# Patient Record
Sex: FEMALE | URBAN - METROPOLITAN AREA
[De-identification: names, ages, dates, MRNs, and addresses within clinical notes are randomized per-mention and may not be internally consistent; named-entity substitution may affect disease eponyms.]

---

## 2021-12-10 ENCOUNTER — EMERGENCY (EMERGENCY)
Facility: HOSPITAL | Age: 56
LOS: 1 days | Discharge: ANOTHER HOSPITAL | End: 2021-12-10
Attending: EMERGENCY MEDICINE | Admitting: EMERGENCY MEDICINE
Payer: MEDICAID

## 2021-12-10 PROCEDURE — 70450 CT HEAD/BRAIN W/O DYE: CPT | Mod: 26,MA

## 2021-12-10 PROCEDURE — 99291 CRITICAL CARE FIRST HOUR: CPT | Mod: 25

## 2021-12-10 PROCEDURE — 93010 ELECTROCARDIOGRAM REPORT: CPT

## 2021-12-10 RX ORDER — LEVETIRACETAM 250 MG/1
1000 TABLET, FILM COATED ORAL ONCE
Refills: 0 | Status: COMPLETED | OUTPATIENT
Start: 2021-12-10 | End: 2021-12-10

## 2021-12-10 NOTE — ED PROVIDER NOTE - IV ALTEPLASE EXCL ABS HIDDEN
Returned to unit from MRI department via cart accompanied by transport with CIV in place. Patient stable.   show

## 2021-12-10 NOTE — ED PROVIDER NOTE - OBJECTIVE STATEMENT
55F no known PMH BIBEMS for concern of stroke. As per daughter at bedside, heard a thump in the living room, found pt on the floor on her stomach ~40min PTA. Living room carpeted, daughter belives she would have fallen off the couch. Pt has since been with slurred speech, not moving her R-side spontaneously. Not on AC. Was in normal state of health prior to this.

## 2021-12-10 NOTE — ED PROVIDER NOTE - CLINICAL SUMMARY MEDICAL DECISION MAKING FREE TEXT BOX
Elmo, PGY3 - 55F no known PMH presents as code stroke with slurred speech, R-sided weakness, LKN ~40min PTA. No e/o trauma on exam, maintaining airway. Plan for labs, CTs, neuro eval, dispo pending results 150/100, 109 Elmo, PGY3 - 55F no known PMH presents as code stroke with slurred speech, R-sided weakness, LKN ~40min PTA. No e/o trauma on exam, maintaining airway. Plan for labs, CTs, neuro eval, dispo pending results 74 minutes of CRITICAL CARE TIME WAS NECESSARY TO TREAT OR PREVENT LIFE THREATENING DETERIORATION FROM THE FOLLOWING CONDITIONS:  [  ] Heart Failure and/or Circulatory Collapse and or MI/NSTEMI  [  ] Sepsis [ X ] Respiratory failure  [ X ]CNS Failure or Compromise    [  ]Dehydration [  ]Renal Failure [  ]Hepatic Failure Sepsis [  ]Endocrine Crisis  [  ]Metabolic Crisis  [  ]Toxidrome   [  ]Trauma    CRITICAL CARE TIME WAS SPENT BY ME IN THE FOLLOWING ACTIVITIES:  [X  ] Performance of the History and Physical Examination [X  ] Review of Old Charts [   ] IV Access and or Obtaining Necessary Diagnostic Tests   [X  ] Ordering and Performing Treatments and Interventions:   Specifically:  [  ] Ventilator Management [  ] Vascular Access Procedures [  ] NGT Placement  [  ] Transcutaneous Pacing  [ X ] Establishment of Goals of Care with the Patient or Their Designated Representative  [X  ] Developing and Evaluating Treatment Plan with Consultants and/or the Primary Provider  [  ] Serial Reevaluation of the Patients Condition and Response to Therapeutic Measures   Specifically: [  ] Interpretation of Cardiac and Respiratory Parameters  [X  ]Ordering and Interpretating  Diagnostic Studies  Comments:  Elmo, PGY3 - 55F no known PMH presents as code stroke with slurred speech, R-sided weakness, LKN ~40min PTA. No e/o trauma on exam, maintaining airway. Plan for labs, CTs, neuro eval, dispo pending results

## 2021-12-10 NOTE — ED PROVIDER NOTE - PROGRESS NOTE DETAILS
Elmo, PGY3 - CT showing L parietal bleed, nsgy paged Elmo, PGY3 - CT showing L basal ganglia bleed, nsgy paged Elmo, PGY3 - Pt previously seen by nsgy, no acute intervention. Pt on repeat exam previously with increased strength to RLE. 4hr interval CTH stable, no change. Will admit to floors. Pt reevaluated, has been sleeping comfortable, airway intact Jacob ALCANTARA: Pt signed out to me.  Repeat CT stable, upon reassessment by neurosurgery, they now would like the patient transferred to [facility] for neurosurgical intervention (accepting Dr Flores).  Pt is stable, awake, no acute complaitns.  Family at bedside notified of transfer and agreeable to plan. Jacob ALCANTARA: Pt signed out to me.  Repeat CT stable, upon reassessment by neurosurgery, they now would like the patient transferred to [facility] ED for angiography and for neurosurgical intervention (accepting Dr Flores).  Pt is stable, awake, no acute complaitns.  Family at bedside notified of transfer and agreeable to plan.

## 2021-12-10 NOTE — ED PROVIDER NOTE - ATTENDING CONTRIBUTION TO CARE
Patient received in signout from Laure James/Ameya   CRITICAL CARE TIME WAS NECESSARY TO TREAT OR PREVENT LIFE THREATENING DETERIORATION FROM THE FOLLOWING CONDITIONS:  [  ] Heart Failure and/or Circulatory Collapse and or MI/NSTEMI  [  ] Sepsis [ X ] Respiratory failure  [ X ]CNS Failure or Compromise    [  ]Dehydration [  ]Renal Failure [  ]Hepatic Failure Sepsis [  ]Endocrine Crisis  [  ]Metabolic Crisis  [  ]Toxidrome   [  ]Trauma    CRITICAL CARE TIME WAS SPENT BY ME IN THE FOLLOWING ACTIVITIES:  [X  ] Performance of the History and Physical Examination [X  ] Review of Old Charts [   ] IV Access and or Obtaining Necessary Diagnostic Tests   [X  ] Ordering and Performing Treatments and Interventions:   Specifically:  [  ] Ventilator Management [  ] Vascular Access Procedures [  ] NGT Placement  [  ] Transcutaneous Pacing  [ X ] Establishment of Goals of Care with the Patient or Their Designated Representative  [X  ] Developing and Evaluating Treatment Plan with Consultants and/or the Primary Provider  [  ] Serial Reevaluation of the Patients Condition and Response to Therapeutic Measures   Specifically: [  ] Interpretation of Cardiac and Respiratory Parameters  [X  ]Ordering and Interpretating  Diagnostic Studies  Comments:    The patient is a 55y Female who has no pertinent past medical history PTED brought as a stroke code by  after being found by them on the floor after an audible fall CT+ for L basal ganglia hemorrhage   Vital Signs Last 24 Hrs  T(F): 98 HR: 94 BP: 131/90 RR: 15 SpO2: 98% (11 Dec 2021 06:22) Weight (kg): 86.5 (12-11-21 @ 00:23)  PE: as described; my additions and exceptions are noted in the chart    DATA:  EKG: NST@118; ? age Inf infarct. NO OLD EKGs  LAB:                       15.7   9.93  )-----------( 301      ( 10 Dec 2021 23:56 )             48.5     PT: 12.0 sec;   INR: 1.05 ratio  PTT - ( 10 Dec 2021 23:56 )  PTT:32.7 sec12-10    136  |  99  |  17  ----------------------------<  167<H>  4.0   |  25  |  0.88    Ca    9.6      10 Dec 2021 23:56 TPro  7.1  /  Alb  4.2  /  TBili  0.4  /  DBili  x   /  AST  40<H>  /  ALT  36<H>  /  AlkPhos  183<H>  12-10  Troponin T, High Sensitivity Result: 6 ng/L (12-10-21 @ 23:56)    CT IMPRESSION: Left basal ganglia hemorrhage, unchanged since the previous exam     IMPRESSION/RISK:  Dx=Basal ganglia bleed   Consideration include: Patient stable in ED while being monitored for progression repeat CT unchanged; Def care at [facility]; will transfer   Plan  Neuro/Neurosurgery consults appreciated    SBP goal <140 mmhg  Telemetry Monitoring  Swallowing assessment  Patient to be transferred to [facility] for further management

## 2021-12-10 NOTE — ED PROVIDER NOTE - PHYSICAL EXAMINATION
I have reviewed the triage vital signs.  Const: awake, in NAD  Eyes: no conjunctival injection, pupils 3mm round  HENT: NCAT, Neck supple, oral mucosa moist  CV: RRR, +S1, S2  Resp: CTAB, no respiratory distress  GI: Abdomen soft, NTND, no guarding  Extremities: No peripheral edema,  2+ radial and DP pulses  Skin: Warm, well perfused, no rash  MSK: No gross deformities appreciated  Neuro: Slurred, speech, Face appears symmetric, moving LUE/LLE spontaneously, RUE/RLE withdraws to pain I have reviewed the triage vital signs.  Const: awake, in NAD  Eyes: no conjunctival injection, pupils 3mm round  HENT: NCAT, Neck supple, oral mucosa moist  CV: RRR, +S1, S2  Resp: CTAB, no respiratory distress  GI: Abdomen soft, NTND, no guarding  Extremities: No peripheral edema,  2+ radial and DP pulses  Skin: Warm, well perfused, no rash  MSK: No gross deformities appreciated  Neuro: Slurred speech, expressive aphasia, Face appears symmetric, moving LUE/LLE spontaneously, RUE/RLE withdraws to pain

## 2021-12-11 ENCOUNTER — INPATIENT (INPATIENT)
Facility: HOSPITAL | Age: 56
LOS: 5 days | Discharge: NURSING, REHAB OR LONG-TERM CARE | DRG: 65 | End: 2021-12-17
Attending: NEUROLOGICAL SURGERY | Admitting: NEUROLOGICAL SURGERY
Payer: MEDICAID

## 2021-12-11 ENCOUNTER — APPOINTMENT (OUTPATIENT)
Dept: NEUROSURGERY | Facility: HOSPITAL | Age: 56
End: 2021-12-11

## 2021-12-11 ENCOUNTER — TRANSCRIPTION ENCOUNTER (OUTPATIENT)
Age: 56
End: 2021-12-11

## 2021-12-11 VITALS
HEART RATE: 110 BPM | OXYGEN SATURATION: 95 % | TEMPERATURE: 99 F | DIASTOLIC BLOOD PRESSURE: 103 MMHG | WEIGHT: 190.48 LBS | RESPIRATION RATE: 20 BRPM | SYSTOLIC BLOOD PRESSURE: 155 MMHG

## 2021-12-11 VITALS
RESPIRATION RATE: 16 BRPM | DIASTOLIC BLOOD PRESSURE: 98 MMHG | OXYGEN SATURATION: 96 % | SYSTOLIC BLOOD PRESSURE: 157 MMHG | TEMPERATURE: 98 F | HEART RATE: 94 BPM

## 2021-12-11 VITALS — WEIGHT: 190.7 LBS

## 2021-12-11 DIAGNOSIS — I61.9 NONTRAUMATIC INTRACEREBRAL HEMORRHAGE, UNSPECIFIED: ICD-10-CM

## 2021-12-11 LAB
A1C WITH ESTIMATED AVERAGE GLUCOSE RESULT: 6.3 % — HIGH (ref 4–5.6)
ALBUMIN SERPL ELPH-MCNC: 4.1 G/DL — SIGNIFICANT CHANGE UP (ref 3.3–5)
ALBUMIN SERPL ELPH-MCNC: 4.2 G/DL — SIGNIFICANT CHANGE UP (ref 3.3–5)
ALP SERPL-CCNC: 177 U/L — HIGH (ref 40–120)
ALP SERPL-CCNC: 183 U/L — HIGH (ref 40–120)
ALT FLD-CCNC: 36 U/L — HIGH (ref 4–33)
ALT FLD-CCNC: 36 U/L — SIGNIFICANT CHANGE UP (ref 10–45)
ANION GAP SERPL CALC-SCNC: 12 MMOL/L — SIGNIFICANT CHANGE UP (ref 7–14)
ANION GAP SERPL CALC-SCNC: 13 MMOL/L — SIGNIFICANT CHANGE UP (ref 5–17)
ANION GAP SERPL CALC-SCNC: 13 MMOL/L — SIGNIFICANT CHANGE UP (ref 5–17)
APTT BLD: 32.7 SEC — SIGNIFICANT CHANGE UP (ref 27–36.3)
APTT BLD: 35 SEC — SIGNIFICANT CHANGE UP (ref 27.5–35.5)
AST SERPL-CCNC: 32 U/L — SIGNIFICANT CHANGE UP (ref 10–40)
AST SERPL-CCNC: 40 U/L — HIGH (ref 4–32)
BASOPHILS # BLD AUTO: 0.04 K/UL — SIGNIFICANT CHANGE UP (ref 0–0.2)
BASOPHILS # BLD AUTO: 0.05 K/UL — SIGNIFICANT CHANGE UP (ref 0–0.2)
BASOPHILS NFR BLD AUTO: 0.4 % — SIGNIFICANT CHANGE UP (ref 0–2)
BASOPHILS NFR BLD AUTO: 0.4 % — SIGNIFICANT CHANGE UP (ref 0–2)
BILIRUB SERPL-MCNC: 0.4 MG/DL — SIGNIFICANT CHANGE UP (ref 0.2–1.2)
BILIRUB SERPL-MCNC: 0.5 MG/DL — SIGNIFICANT CHANGE UP (ref 0.2–1.2)
BLD GP AB SCN SERPL QL: NEGATIVE — SIGNIFICANT CHANGE UP
BLD GP AB SCN SERPL QL: NEGATIVE — SIGNIFICANT CHANGE UP
BUN SERPL-MCNC: 14 MG/DL — SIGNIFICANT CHANGE UP (ref 7–23)
BUN SERPL-MCNC: 17 MG/DL — SIGNIFICANT CHANGE UP (ref 7–23)
BUN SERPL-MCNC: 9 MG/DL — SIGNIFICANT CHANGE UP (ref 7–23)
CALCIUM SERPL-MCNC: 9 MG/DL — SIGNIFICANT CHANGE UP (ref 8.4–10.5)
CALCIUM SERPL-MCNC: 9.1 MG/DL — SIGNIFICANT CHANGE UP (ref 8.4–10.5)
CALCIUM SERPL-MCNC: 9.6 MG/DL — SIGNIFICANT CHANGE UP (ref 8.4–10.5)
CHLORIDE SERPL-SCNC: 104 MMOL/L — SIGNIFICANT CHANGE UP (ref 96–108)
CHLORIDE SERPL-SCNC: 107 MMOL/L — SIGNIFICANT CHANGE UP (ref 96–108)
CHLORIDE SERPL-SCNC: 99 MMOL/L — SIGNIFICANT CHANGE UP (ref 98–107)
CO2 SERPL-SCNC: 20 MMOL/L — LOW (ref 22–31)
CO2 SERPL-SCNC: 20 MMOL/L — LOW (ref 22–31)
CO2 SERPL-SCNC: 25 MMOL/L — SIGNIFICANT CHANGE UP (ref 22–31)
CREAT SERPL-MCNC: 0.38 MG/DL — LOW (ref 0.5–1.3)
CREAT SERPL-MCNC: 0.4 MG/DL — LOW (ref 0.5–1.3)
CREAT SERPL-MCNC: 0.88 MG/DL — SIGNIFICANT CHANGE UP (ref 0.5–1.3)
EOSINOPHIL # BLD AUTO: 0.02 K/UL — SIGNIFICANT CHANGE UP (ref 0–0.5)
EOSINOPHIL # BLD AUTO: 0.13 K/UL — SIGNIFICANT CHANGE UP (ref 0–0.5)
EOSINOPHIL NFR BLD AUTO: 0.2 % — SIGNIFICANT CHANGE UP (ref 0–6)
EOSINOPHIL NFR BLD AUTO: 1.3 % — SIGNIFICANT CHANGE UP (ref 0–6)
ESTIMATED AVERAGE GLUCOSE: 134 MG/DL — HIGH (ref 68–114)
GLUCOSE SERPL-MCNC: 127 MG/DL — HIGH (ref 70–99)
GLUCOSE SERPL-MCNC: 129 MG/DL — HIGH (ref 70–99)
GLUCOSE SERPL-MCNC: 167 MG/DL — HIGH (ref 70–99)
HCG SERPL-ACNC: <2 MIU/ML — SIGNIFICANT CHANGE UP
HCT VFR BLD CALC: 42.7 % — SIGNIFICANT CHANGE UP (ref 34.5–45)
HCT VFR BLD CALC: 46.8 % — HIGH (ref 34.5–45)
HCT VFR BLD CALC: 48.5 % — HIGH (ref 34.5–45)
HEPARINASE TEG R TIME: 5.5 MIN — SIGNIFICANT CHANGE UP (ref 4.3–8.3)
HGB BLD-MCNC: 14.2 G/DL — SIGNIFICANT CHANGE UP (ref 11.5–15.5)
HGB BLD-MCNC: 15.3 G/DL — SIGNIFICANT CHANGE UP (ref 11.5–15.5)
HGB BLD-MCNC: 15.7 G/DL — HIGH (ref 11.5–15.5)
IANC: 6.48 K/UL — SIGNIFICANT CHANGE UP (ref 1.5–8.5)
IMM GRANULOCYTES NFR BLD AUTO: 0.2 % — SIGNIFICANT CHANGE UP (ref 0–1.5)
IMM GRANULOCYTES NFR BLD AUTO: 0.4 % — SIGNIFICANT CHANGE UP (ref 0–1.5)
INR BLD: 1.04 RATIO — SIGNIFICANT CHANGE UP (ref 0.88–1.16)
INR BLD: 1.05 RATIO — SIGNIFICANT CHANGE UP (ref 0.88–1.16)
LYMPHOCYTES # BLD AUTO: 1.67 K/UL — SIGNIFICANT CHANGE UP (ref 1–3.3)
LYMPHOCYTES # BLD AUTO: 13.5 % — SIGNIFICANT CHANGE UP (ref 13–44)
LYMPHOCYTES # BLD AUTO: 2.61 K/UL — SIGNIFICANT CHANGE UP (ref 1–3.3)
LYMPHOCYTES # BLD AUTO: 26.3 % — SIGNIFICANT CHANGE UP (ref 13–44)
MAGNESIUM SERPL-MCNC: 2 MG/DL — SIGNIFICANT CHANGE UP (ref 1.6–2.6)
MCHC RBC-ENTMCNC: 28 PG — SIGNIFICANT CHANGE UP (ref 27–34)
MCHC RBC-ENTMCNC: 28 PG — SIGNIFICANT CHANGE UP (ref 27–34)
MCHC RBC-ENTMCNC: 28.5 PG — SIGNIFICANT CHANGE UP (ref 27–34)
MCHC RBC-ENTMCNC: 32.4 GM/DL — SIGNIFICANT CHANGE UP (ref 32–36)
MCHC RBC-ENTMCNC: 32.7 GM/DL — SIGNIFICANT CHANGE UP (ref 32–36)
MCHC RBC-ENTMCNC: 33.3 GM/DL — SIGNIFICANT CHANGE UP (ref 32–36)
MCV RBC AUTO: 84.2 FL — SIGNIFICANT CHANGE UP (ref 80–100)
MCV RBC AUTO: 85.7 FL — SIGNIFICANT CHANGE UP (ref 80–100)
MCV RBC AUTO: 88 FL — SIGNIFICANT CHANGE UP (ref 80–100)
MONOCYTES # BLD AUTO: 0.65 K/UL — SIGNIFICANT CHANGE UP (ref 0–0.9)
MONOCYTES # BLD AUTO: 0.83 K/UL — SIGNIFICANT CHANGE UP (ref 0–0.9)
MONOCYTES NFR BLD AUTO: 6.5 % — SIGNIFICANT CHANGE UP (ref 2–14)
MONOCYTES NFR BLD AUTO: 6.7 % — SIGNIFICANT CHANGE UP (ref 2–14)
NEUTROPHILS # BLD AUTO: 6.48 K/UL — SIGNIFICANT CHANGE UP (ref 1.8–7.4)
NEUTROPHILS # BLD AUTO: 9.74 K/UL — HIGH (ref 1.8–7.4)
NEUTROPHILS NFR BLD AUTO: 65.3 % — SIGNIFICANT CHANGE UP (ref 43–77)
NEUTROPHILS NFR BLD AUTO: 78.8 % — HIGH (ref 43–77)
NRBC # BLD: 0 /100 WBCS — SIGNIFICANT CHANGE UP
NRBC # BLD: 0 /100 WBCS — SIGNIFICANT CHANGE UP (ref 0–0)
NRBC # BLD: 0 /100 WBCS — SIGNIFICANT CHANGE UP (ref 0–0)
NRBC # FLD: 0 K/UL — SIGNIFICANT CHANGE UP
PA ADP PRP-ACNC: 174 PRU — LOW (ref 194–417)
PHOSPHATE SERPL-MCNC: 3.3 MG/DL — SIGNIFICANT CHANGE UP (ref 2.5–4.5)
PLATELET # BLD AUTO: 297 K/UL — SIGNIFICANT CHANGE UP (ref 150–400)
PLATELET # BLD AUTO: 301 K/UL — SIGNIFICANT CHANGE UP (ref 150–400)
PLATELET # BLD AUTO: 323 K/UL — SIGNIFICANT CHANGE UP (ref 150–400)
POTASSIUM SERPL-MCNC: 3.7 MMOL/L — SIGNIFICANT CHANGE UP (ref 3.5–5.3)
POTASSIUM SERPL-MCNC: 3.9 MMOL/L — SIGNIFICANT CHANGE UP (ref 3.5–5.3)
POTASSIUM SERPL-MCNC: 4 MMOL/L — SIGNIFICANT CHANGE UP (ref 3.5–5.3)
POTASSIUM SERPL-SCNC: 3.7 MMOL/L — SIGNIFICANT CHANGE UP (ref 3.5–5.3)
POTASSIUM SERPL-SCNC: 3.9 MMOL/L — SIGNIFICANT CHANGE UP (ref 3.5–5.3)
POTASSIUM SERPL-SCNC: 4 MMOL/L — SIGNIFICANT CHANGE UP (ref 3.5–5.3)
PROT SERPL-MCNC: 7.1 G/DL — SIGNIFICANT CHANGE UP (ref 6–8.3)
PROT SERPL-MCNC: 7.8 G/DL — SIGNIFICANT CHANGE UP (ref 6–8.3)
PROTHROM AB SERPL-ACNC: 12 SEC — SIGNIFICANT CHANGE UP (ref 10.6–13.6)
PROTHROM AB SERPL-ACNC: 12.4 SEC — SIGNIFICANT CHANGE UP (ref 10.6–13.6)
RAPIDTEG MAXIMUM AMPLITUDE: 67.4 MM — SIGNIFICANT CHANGE UP (ref 52–70)
RBC # BLD: 5.07 M/UL — SIGNIFICANT CHANGE UP (ref 3.8–5.2)
RBC # BLD: 5.46 M/UL — HIGH (ref 3.8–5.2)
RBC # BLD: 5.51 M/UL — HIGH (ref 3.8–5.2)
RBC # FLD: 13.2 % — SIGNIFICANT CHANGE UP (ref 10.3–14.5)
RBC # FLD: 13.2 % — SIGNIFICANT CHANGE UP (ref 10.3–14.5)
RBC # FLD: 13.3 % — SIGNIFICANT CHANGE UP (ref 10.3–14.5)
RH IG SCN BLD-IMP: POSITIVE — SIGNIFICANT CHANGE UP
SARS-COV-2 RNA SPEC QL NAA+PROBE: SIGNIFICANT CHANGE UP
SODIUM SERPL-SCNC: 136 MMOL/L — SIGNIFICANT CHANGE UP (ref 135–145)
SODIUM SERPL-SCNC: 137 MMOL/L — SIGNIFICANT CHANGE UP (ref 135–145)
SODIUM SERPL-SCNC: 140 MMOL/L — SIGNIFICANT CHANGE UP (ref 135–145)
TEG FUNCTIONAL FIBRINOGEN: 26.6 MM — SIGNIFICANT CHANGE UP (ref 15–32)
TEG MAXIMUM AMPLITUDE: 66 MM — SIGNIFICANT CHANGE UP (ref 52–69)
TEG REACTION TIME: 6.8 MIN — SIGNIFICANT CHANGE UP (ref 4.6–9.1)
TROPONIN T, HIGH SENSITIVITY RESULT: 6 NG/L — SIGNIFICANT CHANGE UP
TROPONIN T, HIGH SENSITIVITY RESULT: 7 NG/L — SIGNIFICANT CHANGE UP (ref 0–51)
WBC # BLD: 12.36 K/UL — HIGH (ref 3.8–10.5)
WBC # BLD: 9.26 K/UL — SIGNIFICANT CHANGE UP (ref 3.8–10.5)
WBC # BLD: 9.93 K/UL — SIGNIFICANT CHANGE UP (ref 3.8–10.5)
WBC # FLD AUTO: 12.36 K/UL — HIGH (ref 3.8–10.5)
WBC # FLD AUTO: 9.26 K/UL — SIGNIFICANT CHANGE UP (ref 3.8–10.5)
WBC # FLD AUTO: 9.93 K/UL — SIGNIFICANT CHANGE UP (ref 3.8–10.5)

## 2021-12-11 PROCEDURE — 99291 CRITICAL CARE FIRST HOUR: CPT

## 2021-12-11 PROCEDURE — 36226 PLACE CATH VERTEBRAL ART: CPT | Mod: 50

## 2021-12-11 PROCEDURE — 36227 PLACE CATH XTRNL CAROTID: CPT

## 2021-12-11 PROCEDURE — 70450 CT HEAD/BRAIN W/O DYE: CPT | Mod: 26,MA

## 2021-12-11 PROCEDURE — 70498 CT ANGIOGRAPHY NECK: CPT | Mod: 26,MG

## 2021-12-11 PROCEDURE — 36224 PLACE CATH CAROTD ART: CPT | Mod: 50

## 2021-12-11 PROCEDURE — 99292 CRITICAL CARE ADDL 30 MIN: CPT

## 2021-12-11 PROCEDURE — 99253 IP/OBS CNSLTJ NEW/EST LOW 45: CPT | Mod: 25

## 2021-12-11 PROCEDURE — G1004: CPT

## 2021-12-11 PROCEDURE — 70450 CT HEAD/BRAIN W/O DYE: CPT | Mod: 26,77

## 2021-12-11 PROCEDURE — 99232 SBSQ HOSP IP/OBS MODERATE 35: CPT | Mod: 25

## 2021-12-11 PROCEDURE — 70496 CT ANGIOGRAPHY HEAD: CPT | Mod: 26,MG

## 2021-12-11 PROCEDURE — 70450 CT HEAD/BRAIN W/O DYE: CPT | Mod: 26,59,MG

## 2021-12-11 PROCEDURE — 93010 ELECTROCARDIOGRAM REPORT: CPT

## 2021-12-11 PROCEDURE — 93010 ELECTROCARDIOGRAM REPORT: CPT | Mod: 76

## 2021-12-11 RX ORDER — NICARDIPINE HYDROCHLORIDE 30 MG/1
5 CAPSULE, EXTENDED RELEASE ORAL
Qty: 40 | Refills: 0 | Status: DISCONTINUED | OUTPATIENT
Start: 2021-12-11 | End: 2021-12-12

## 2021-12-11 RX ORDER — LEVETIRACETAM 250 MG/1
500 TABLET, FILM COATED ORAL EVERY 12 HOURS
Refills: 0 | Status: DISCONTINUED | OUTPATIENT
Start: 2021-12-11 | End: 2021-12-11

## 2021-12-11 RX ORDER — ONDANSETRON 8 MG/1
4 TABLET, FILM COATED ORAL EVERY 6 HOURS
Refills: 0 | Status: DISCONTINUED | OUTPATIENT
Start: 2021-12-11 | End: 2021-12-12

## 2021-12-11 RX ORDER — ACETAMINOPHEN 500 MG
650 TABLET ORAL EVERY 6 HOURS
Refills: 0 | Status: DISCONTINUED | OUTPATIENT
Start: 2021-12-11 | End: 2021-12-17

## 2021-12-11 RX ORDER — LEVETIRACETAM 250 MG/1
500 TABLET, FILM COATED ORAL EVERY 12 HOURS
Refills: 0 | Status: DISCONTINUED | OUTPATIENT
Start: 2021-12-11 | End: 2021-12-12

## 2021-12-11 RX ORDER — PANTOPRAZOLE SODIUM 20 MG/1
40 TABLET, DELAYED RELEASE ORAL
Refills: 0 | Status: DISCONTINUED | OUTPATIENT
Start: 2021-12-11 | End: 2021-12-11

## 2021-12-11 RX ORDER — POTASSIUM CHLORIDE 20 MEQ
40 PACKET (EA) ORAL ONCE
Refills: 0 | Status: DISCONTINUED | OUTPATIENT
Start: 2021-12-11 | End: 2021-12-12

## 2021-12-11 RX ORDER — PANTOPRAZOLE SODIUM 20 MG/1
40 TABLET, DELAYED RELEASE ORAL DAILY
Refills: 0 | Status: DISCONTINUED | OUTPATIENT
Start: 2021-12-11 | End: 2021-12-12

## 2021-12-11 RX ORDER — SODIUM CHLORIDE 9 MG/ML
1000 INJECTION INTRAMUSCULAR; INTRAVENOUS; SUBCUTANEOUS
Refills: 0 | Status: DISCONTINUED | OUTPATIENT
Start: 2021-12-11 | End: 2021-12-12

## 2021-12-11 RX ORDER — SENNA PLUS 8.6 MG/1
2 TABLET ORAL AT BEDTIME
Refills: 0 | Status: DISCONTINUED | OUTPATIENT
Start: 2021-12-11 | End: 2021-12-17

## 2021-12-11 RX ADMIN — SODIUM CHLORIDE 75 MILLILITER(S): 9 INJECTION INTRAMUSCULAR; INTRAVENOUS; SUBCUTANEOUS at 17:32

## 2021-12-11 RX ADMIN — LEVETIRACETAM 400 MILLIGRAM(S): 250 TABLET, FILM COATED ORAL at 00:09

## 2021-12-11 RX ADMIN — LEVETIRACETAM 400 MILLIGRAM(S): 250 TABLET, FILM COATED ORAL at 17:18

## 2021-12-11 RX ADMIN — PANTOPRAZOLE SODIUM 40 MILLIGRAM(S): 20 TABLET, DELAYED RELEASE ORAL at 17:18

## 2021-12-11 RX ADMIN — NICARDIPINE HYDROCHLORIDE 25 MG/HR: 30 CAPSULE, EXTENDED RELEASE ORAL at 10:00

## 2021-12-11 NOTE — ED PROVIDER NOTE - CRITICAL CARE ATTENDING CONTRIBUTION TO CARE
------------ATTENDING NOTE------------  pt transferred from [facility] for concerns of spontaneous IPH / hemiplegia, HD stable on ED arrival, protecting airway, oxygenating/ventilating well, no additional complaints, awaiting labs/imaging and NSGY consult for tx / planned admission.  - Wan Thomas MD   ----------------------------------------------

## 2021-12-11 NOTE — ED ADULT NURSE REASSESSMENT NOTE - NS ED NURSE REASSESS COMMENT FT1
PT is resting in stretcher, arousable to physical stimuli. pt breathing even and labored on room air. pt NSR on the CM. pt appears comfortable with family at bedside. no apparent distress noted. will continue to monitor.

## 2021-12-11 NOTE — ED PROVIDER NOTE - PHYSICAL EXAMINATION
CONSTITUTIONAL: non-toxic, NAD  SKIN: no rash, no petechiae.  EYES: PERRL, EOMI, pink conjunctiva, anicteric  NECK: Supple; no meningismus, no JVD  CARD: RRR, no murmurs, equal radial pulses bilaterally 2+  RESP: CTAB, no respiratory distress  ABD: Soft, non-tender, non-distended, no peritoneal signs, no CVA tenderness  EXT: Normal ROM x4. No edema. No calf tenderness  NEURO: Alert, following commands. Right sided facial weakness sparing forehead. Following commands. Dysarthric speech. RUE 0/5 strength RLE 3/5 strength LUE 5/5 strength LLE 4/5 strength.  PSYCH: Cooperative, appropriate. CONSTITUTIONAL: non-toxic, NAD  SKIN: no rash, no petechiae.  EYES: PERRL, EOMI, pink conjunctiva, anicteric  NECK: Supple; no meningismus, no JVD  CARD: RRR, no murmurs, equal radial pulses bilaterally 2+  RESP: CTAB, no respiratory distress  ABD: Soft, non-tender, non-distended, no peritoneal signs, no CVA tenderness  EXT: No edema. No calf tenderness  NEURO: Alert, following commands. Right sided facial weakness sparing forehead. Following commands. Dysarthric speech. RUE 0/5 strength RLE 3/5 strength LUE 5/5 strength LLE 4/5 strength.  PSYCH: Cooperative, appropriate.

## 2021-12-11 NOTE — CONSULT NOTE ADULT - ATTENDING COMMENTS
L basal ganglia ICH, moderate size with negative CTA. Given relatively young age and no history of HTN, angiogram is warranted to definitively rule out underlying vascular lesion. Will transfer to [facility] for angio.

## 2021-12-11 NOTE — ED ADULT NURSE REASSESSMENT NOTE - NS ED NURSE REASSESS COMMENT FT1
Neurosurgery MD at bedside evaluating patient. Neurosurgery MD at bedside evaluating patient. Per MD Mcdaniel, SBP to be <160.

## 2021-12-11 NOTE — PROGRESS NOTE ADULT - SUBJECTIVE AND OBJECTIVE BOX
HPI:  55F w/ no sig pmh, not on AC/AP found down by daughter w/ R weakness and slurred speech after hearing a sound found to have a L 4.5 x 2.4 x 3.6 cm BG IPH with no shift, CTA neg. 10hr repeat on Left done at [facility] stable. LKN 10:30 pm.  Exam: awake, eomi, pupils 3R b/l, R facial, FC, expressive aphasia, R hemiparesis 2/5, L side 5/5, no sensation on Right side.  -Adm NSCU, SBP<160  -Hold AC/AP,  -Keppra 500 bid  -Preop Angio today (11 Dec 2021 11:42)    SURGERY:       EVENTS:         ICU Vital Signs Last 24 Hrs  T(C): 36.1 (11 Dec 2021 16:00), Max: 36.6 (11 Dec 2021 09:20)  T(F): 97 (11 Dec 2021 16:00), Max: 97.8 (11 Dec 2021 09:20)  HR: 77 (11 Dec 2021 16:15) (68 - 98)  BP: 135/108 (11 Dec 2021 16:15) (115/83 - 160/85)  BP(mean): 118 (11 Dec 2021 16:15) (94 - 118)  ABP: --  ABP(mean): --  RR: 17 (11 Dec 2021 16:15) (14 - 17)  SpO2: 98% (11 Dec 2021 16:15) (94% - 99%)     12-11 @ 07:01  -  12-11 @ 16:26  --------------------------------------------------------  IN: 40 mL / OUT: 0 mL / NET: 40 mL                             15.3   12.36 )-----------( 323      ( 11 Dec 2021 10:05 )             46.8    12-11    137  |  104  |  14  ----------------------------<  129<H>  3.9   |  20<L>  |  0.40<L>    Ca    9.1      11 Dec 2021 10:05    TPro  7.8  /  Alb  4.1  /  TBili  0.5  /  DBili  x   /  AST  32  /  ALT  36  /  AlkPhos  177<H>  12-11            PHYSICAL EXAM:    General: No Acute Distress     Neurological: Awake, alert to self place w choice, not year, FC, EOMI w L gaze pref, crosses midline, PERRL 3mm bilat, R facial, marked expressive aphasia w dysarthria, RUE extends to nox, flaccid, RLE safeguard ,wiggles toes to command, L side intact, no drift, no extinction    Pulmonary: Clear to Auscultation, No Rales, No Rhonchi, No Wheezes     Cardiovascular: S1, S2, Regular Rate and Rhythm     Gastrointestinal: Soft, Nontender, distended     Extremities: No calf tenderness     Incision:       MEDICATIONS:  Antibiotics:      Neurological:   acetaminophen     Tablet .. 650 milliGRAM(s) Oral every 6 hours PRN  levETIRAcetam 500 milliGRAM(s) Oral every 12 hours  ondansetron Injectable 4 milliGRAM(s) IV Push every 6 hours PRN    Cardiac:   niCARdipine Infusion 5 mG/Hr IV Continuous <Continuous>    Pulm:    Heme:     Other:   pantoprazole    Tablet 40 milliGRAM(s) Oral before breakfast  senna 2 Tablet(s) Oral at bedtime PRN Constipation  sodium chloride 0.9%. 1000 milliLiter(s) IV Continuous <Continuous>       DEVICES: [] Restraints [] DIONY/HMV []LD [] ET tube [] Trach [] Chest Tube [] A-line [] Michael [] NGT [] Rectal Tube       A/P:  HPI:  55F w/ no sig pmh, not on AC/AP found down by daughter w/ R weakness and slurred speech after hearing a sound found to have a L 4.5 x 2.4 x 3.6 cm BG IPH with no shift, CTA neg. 10hr repeat on Left done at [facility] stable. LKN 10:30 pm.  Exam: awake, eomi, pupils 3R b/l, R facial, FC, expressive aphasia, R hemiparesis 2/5, L side 5/5, no sensation on Right side.        Neuro: nchecksq1  angio negative  stability CTH around 7 30 pm  hold AC, AP  Pain control  keppra sz ppx per nsgy  Stroke core measures  PT OT  Respiratory: RA IS  CV: check echo, troponin, ecg, lipid profile  -160 cardene PRN  Endocrine: check a1c glycemic goal 120-180  Heme/Onc:       check LED      DVT ppx: SCDs, hold ac ap  Renal: IVF until good PO intake, IOs  ID: luekocytosis monitor  GI: NPO dysphagia screen, swallow eval  Social/Family:   Discharge planning:     Code Status: [x] Full Code [] DNR [] DNI [] Goals of Care:   Disposition: [x] ICU [] Stroke Unit [] RCU []PCU []Floor [] Discharge Home     Patient at high risk for neurologic deterioration, critical care time, excluding procedures: 45 minutes                    HPI:  55F w/ no sig pmh, not on AC/AP found down by daughter w/ R weakness and slurred speech after hearing a sound found to have a L 4.5 x 2.4 x 3.6 cm BG IPH with no shift, CTA neg. 10hr repeat on Left done at [facility] stable. LKN 10:30 pm.  Exam: awake, eomi, pupils 3R b/l, R facial, FC, expressive aphasia, R hemiparesis 2/5, L side 5/5, no sensation on Right side.  -Adm NSCU, SBP<160  -Hold AC/AP,  -Keppra 500 bid  -Preop Angio today (11 Dec 2021 11:42)    SURGERY:       EVENTS:         ICU Vital Signs Last 24 Hrs  T(C): 36.1 (11 Dec 2021 16:00), Max: 36.6 (11 Dec 2021 09:20)  T(F): 97 (11 Dec 2021 16:00), Max: 97.8 (11 Dec 2021 09:20)  HR: 77 (11 Dec 2021 16:15) (68 - 98)  BP: 135/108 (11 Dec 2021 16:15) (115/83 - 160/85)  BP(mean): 118 (11 Dec 2021 16:15) (94 - 118)  ABP: --  ABP(mean): --  RR: 17 (11 Dec 2021 16:15) (14 - 17)  SpO2: 98% (11 Dec 2021 16:15) (94% - 99%)     12-11 @ 07:01  -  12-11 @ 16:26  --------------------------------------------------------  IN: 40 mL / OUT: 0 mL / NET: 40 mL                             15.3   12.36 )-----------( 323      ( 11 Dec 2021 10:05 )             46.8    12-11    137  |  104  |  14  ----------------------------<  129<H>  3.9   |  20<L>  |  0.40<L>    Ca    9.1      11 Dec 2021 10:05    TPro  7.8  /  Alb  4.1  /  TBili  0.5  /  DBili  x   /  AST  32  /  ALT  36  /  AlkPhos  177<H>  12-11            PHYSICAL EXAM:    General: No Acute Distress     Neurological: Awake, alert to self place w choice, not year, FC, EOMI w L gaze pref, crosses midline, PERRL 3mm bilat, R facial, marked expressive aphasia w dysarthria, RUE extends to nox, flaccid, RLE safeguard ,wiggles toes to command, L side intact, no drift, no extinction    Pulmonary: Clear to Auscultation, No Rales, No Rhonchi, No Wheezes     Cardiovascular: S1, S2, Regular Rate and Rhythm     Gastrointestinal: Soft, Nontender, distended     Extremities: No calf tenderness     Incision:       MEDICATIONS:  Antibiotics:      Neurological:   acetaminophen     Tablet .. 650 milliGRAM(s) Oral every 6 hours PRN  levETIRAcetam 500 milliGRAM(s) Oral every 12 hours  ondansetron Injectable 4 milliGRAM(s) IV Push every 6 hours PRN    Cardiac:   niCARdipine Infusion 5 mG/Hr IV Continuous <Continuous>    Pulm:    Heme:     Other:   pantoprazole    Tablet 40 milliGRAM(s) Oral before breakfast  senna 2 Tablet(s) Oral at bedtime PRN Constipation  sodium chloride 0.9%. 1000 milliLiter(s) IV Continuous <Continuous>       DEVICES: [] Restraints [] DIONY/HMV []LD [] ET tube [] Trach [] Chest Tube [] A-line [] Michael [] NGT [] Rectal Tube       A/P:  55F presented with ICH , ICH score 1  likely HTN, angio negative   Neuro: nchecksq1  angio negative   stability CTH around 7 30 pm  hold AC, AP  Pain control  keppra sz ppx per nsgy  Stroke core measures  PT OT  Respiratory: RA IS  CV: check echo, troponin, ecg, lipid profile  -160 cardene PRN  Endocrine: check a1c glycemic goal 120-180  Heme/Onc:       check LED      DVT ppx: SCDs, hold ac ap  Renal: IVF until good PO intake, IOs  ID: luekocytosis monitor  GI: NPO dysphagia screen, swallow eval  Social/Family:   Discharge planning:     Code Status: [x] Full Code [] DNR [] DNI [] Goals of Care:   Disposition: [x] ICU [] Stroke Unit [] RCU []PCU []Floor [] Discharge Home     Patient at high risk for neurologic deterioration, critical care time, excluding procedures: 35 minutes

## 2021-12-11 NOTE — ED ADULT NURSE NOTE - OBJECTIVE STATEMENT
Juan RN note- patient arrives to the ED as a notification code stroke. Patient able to verbalize own name but unable to answer any other questions appropriately. Per patient's daughter she heard a loud noise and found the patient on the ground. Speech garbled and not appropriate. Slight ride sided facial droop noted. Patient responds to pain on the right upper and lower extremities but is unable to lift right upper and lower extremity. Normal movement noted to left side upper and lower extremities.  Stroke scale and dysphagia screen as documented. 20g IV placed to left AC. . Labs sent as ordered. COVID swab sent. Cardiac monitor in place- sinus tachycardia. Dr. Borrego at bedside for evaluation. Safety maintained. Patient stable upon exiting the room.

## 2021-12-11 NOTE — ED ADULT TRIAGE NOTE - CHIEF COMPLAINT QUOTE
pt arrives as code stroke for right sided paralysis that started 20 mins prior to arrival. direct to tra

## 2021-12-11 NOTE — CONSULT NOTE ADULT - ASSESSMENT
**INCOMPLETE**    Recommendations:  Imaging:  [] Repeat CTH in 4hrs and 24hrs or prn for worsening mental status or neuro exam  [] MRI brain w/ and w/o contrast  [] TTE    Meds:  [] Hold ASA and lovenox, use mechanical DVT prophylaxis for now  [] Consider labetalol for BP control    Labs:  [] HgA1c, Lipid profile    Other:  [] SBP goal 140-160 mmhg  [] F/u Neurosurgery consult  [] Telemetry Monitoring  [] Neuro checks, vitals per unit protocol  [] NPO unless passes bedside swallow. If fails, swallow eval  [] PT/OT evaluation  [] Stroke education provided      Case discussed with telestroke attending, Dr. Gutiérrez. To be seen and discussed with Dr. Freed. **INCOMPLETE**    Recommendations:  Imaging:  [] Repeat CTH in 4hrs and 24hrs or prn for worsening mental status or neuro exam  [] MRI brain w/ and w/o contrast  [] TTE    Meds:  [] Hold ASA and lovenox, use mechanical DVT prophylaxis for now  [] Consider labetalol for BP control    Labs:  [] HgA1c, Lipid profile    Other:  [] SBP goal <140 mmhg  [] F/u Neurosurgery consult  [] Telemetry Monitoring  [] Neuro checks, vitals per unit protocol  [] NPO unless passes bedside swallow. If fails, swallow eval  [] PT/OT evaluation  [] Stroke education provided      Case discussed with telestroke attending, Dr. Gutiérrez. To be seen and discussed with Dr. Freed. **INCOMPLETE**    Recommendations:  Patient to be transferred to [facility] for angiography    Imaging:  [] Repeat CTH in 4hrs and 24hrs or prn for worsening mental status or neuro exam  [] MRI brain w/ and w/o contrast  [] Angiogram as per NS  [] TTE    Meds:  [] Hold ASA and lovenox, use mechanical DVT prophylaxis for now  [] Consider labetalol for BP control    Labs:  [] HgA1c, Lipid profile    Other:  [] SBP goal <140 mmhg  [] F/u Neurosurgery consult  [] Telemetry Monitoring  [] Neuro checks, vitals per unit protocol  [] NPO unless passes bedside swallow. If fails, swallow eval  [] PT/OT evaluation  [] Stroke education provided      Case discussed with telestroke attending, Dr. Gutiérrez. To be seen and discussed with Dr. Freed. 55 y.o. RH W with no known PMH BIBEMS with R sided weakness, aphasia, and slurred speech. LKN 23:00. VS on presentation significant for /103, . On evaluation patient noted to have R hemiparesis, expressive and receptive aphasia, and dysarthria. CTH w/o demonstrates acute L basal ganglia IPH with partial effacement of the L lateral ventricle; no midline shift.      Impression: R hemiparesis, global aphasia and dysarthria secondary to L hemispheric dysfunction due to known acute L basal ganglia IPH.     Recommendations:  - Patient to be transferred to [facility] for angiography    Imaging:  [] Repeat CTH in 4hrs and 24hrs or prn for worsening mental status or neuro exam  [] MRI brain w/ and w/o contrast  [] Angiogram as per NS  [] TTE    Meds:  [] Hold ASA and lovenox, use mechanical DVT prophylaxis for now  [] Consider labetalol for BP control    Labs:  [] HgA1c, Lipid profile    Other:  [] SBP goal <140 mmhg  [] F/u Neurosurgery recs  [] Telemetry Monitoring  [] Neuro checks, vitals per unit protocol  [] NPO unless passes bedside swallow. If fails, swallow eval  [] PT/OT evaluation  [] Stroke education provided      Case discussed with telestroke attending, Dr. Gutiérrez. To be seen and discussed with Dr. Freed.

## 2021-12-11 NOTE — DISCHARGE NOTE NURSING/CASE MANAGEMENT/SOCIAL WORK - NSDCPEFALRISK_GEN_ALL_CORE
For information on Fall & Injury Prevention, visit: [email or web address] OR  [email or web address] OR  https://www.cdc.gov/steadi/patient.html

## 2021-12-11 NOTE — H&P ADULT - ASSESSMENT
55F w/ no sig pmh, not on AC/AP found down by daughter w/ R weakness and slurred speech after hearing a sound found to have a L 4.5 x 2.4 x 3.6 cm BG IPH with no shift, CTA neg. 10hr repeat done at [facility] stable. LKN 10:30 pm.  Exam: awake, eomi, pupils 3R b/l, R facial, FC, expressive aphasia, R hemiparesis 2/5, L side 5/5, no sensation on Right side.  -Adm NSCU, SBP<160  -Hold AC/AP,  -Keppra 500 bid  -Preop Angio today

## 2021-12-11 NOTE — CHART NOTE - NSCHARTNOTEFT_GEN_A_CORE
Interventional Neuro- Radiology   Procedure Note PA-C    Procedure: Selective Cerebral Angiography   Pre- Procedure Diagnosis: left basal ganglia hemorrhage  Post- Procedure Diagnosis: normal cerebral angiogram    : Dr. Marcus Flores  Fellow: Dr. Beny Castillo  Resident: Dr. Stepan Mireles    NP: Graciela Shaver    RN: Shankar    Anesthesia: (MAC) Dr. Stepan Lin    Sheath: 5 Macedonian short    I/Os:  Estimated blood loss: less than 10cc  IV fluids: 100cc     Urine output: Due to void Contrast Omnipaque 240: 87cc             Vitals: /67        HR  88    Spo2  100  %        Preliminary Report:  Using a 5Fr short/ sheath to the right groin under MAC sedation via   left vertebral artery,  left common carotid artery, left external carotid artey, right vertebral arter,  right common carotid artery, right external carotid artery  a selective cerebral angiography was performed and  demonstrates ________. ( Official note to follow).  Patient tolerated procedure well, hemodynamically stable, no change in neurological status compared to baseline.  Results discussed with neurosurgery, patient and patient's  family.  Groin sheath was removed,  manual compression held to hemostasis  for  21 minutes, no active bleeding, no hematoma, avitene applied,  quick clot and safeguard balloon dressing applied at _____h.  STAT labs:  CBC BMP  ____h.  Patient transfered to Recovery Room Interventional Neuro- Radiology   Procedure Note PA-C    Procedure: Selective Cerebral Angiography   Pre- Procedure Diagnosis: left basal ganglia hemorrhage  Post- Procedure Diagnosis: normal cerebral angiogram    : Dr. Marcus Flores  Fellow: Dr. Beny Castillo  Resident: Dr. Stepan Mireles    NP: Graciela Shaver    RN: Shankar    Anesthesia: (MAC) Dr. Stepan Villarreal    Sheath: 5 Latvian short    I/Os:  Estimated blood loss: less than 10cc  IV fluids: 100cc     Urine output: Due to void Contrast Omnipaque 240: 87cc             Vitals: /67        HR  88    Spo2  100  %        Preliminary Report:  Using a 5Fr short/ sheath to the right groin under MAC sedation via  left vertebral artery,  left internal carotid artery, left external carotid artery, right internal carotid artery, right external carotid artery  a selective cerebral angiography was performed and  demonstrates normal cerebral angiogram. ( Official note to follow).  Patient tolerated procedure well, hemodynamically stable, no change in neurological status compared to pre op remains expressive aphasia, oriented to self, lethargic, right side plegic, left side 5/5.  Results discussed with neurosurgery, patient and patient's  family.  Groin sheath was removed, vascade device deployed,  manual compression held to hemostasis  for  21 minutes, no active bleeding, no hematoma,  quick clot and safeguard balloon dressing applied at 1500h.  Patient transferred to PACU

## 2021-12-11 NOTE — PROGRESS NOTE ADULT - SUBJECTIVE AND OBJECTIVE BOX
NSCU Progress Note    Assessment/Hospital Course:    55F w/ no sig pmh, not on AC/AP found down by daughter w/ R weakness and slurred speech after hearing a sound found to have a L 4.5 x 2.4 x 3.6 cm BG IPH with no shift, CTA/angio negative for vascular lesion, likely in the s/o uncontrolled HTN.    ICH score 1    24 Hour Events/Subjective:  - L BG heme ICH score 1, angio negative  - elevated leuks, likely reactive, no focal infectious source, on RA      REVIEW OF SYSTEMS:  - negative except as above    VITALS:   - T(C): 37.2 (12-11-21 @ 16:15), Max: 37.2 (12-11-21 @ 16:15)  T(F): 98.9 (12-11-21 @ 16:15), Max: 98.9 (12-11-21 @ 16:15)  HR: 70 (12-11-21 @ 18:00) (67 - 98)  BP: 152/95 (12-11-21 @ 18:00) (115/83 - 160/85)  ABP: --  ABP(mean): --  RR: 15 (12-11-21 @ 18:00) (13 - 18)  SpO2: 99% (12-11-21 @ 18:00) (94% - 99%)      IMAGING/DATA:   - COVID      COVID Biomarkers            Trend Cardiac Enzymes    Trend BNP    Procalcitonin Trend    WBC Trend  12-11-21 @ 10:05   -  12.36<H>    H/H Trend  12-11-21 @ 10:05   -   15.3/ 46.8<H>    Stool Occult Blood    Platelet Trend  12-11-21 @ 10:05   -  323    Trend Sodium  12-11-21 @ 10:05   -  137    Trend Potassium  12-11-21 @ 10:05   -  3.9    Trend Bun/Cr  12-11-21 @ 10:05  BUN/CR -  14 / 0.40<L>    Lactic Acid Trend    ABG Trend    Trend AST/ALT/ALK Phos/Bili  12-11-21 @ 10:05   32/36/177<H>/0.5      Ammonia Trend      Amylase / Lipase Trend      Albumin Trend  12-11-21 @ 10:05   -   4.1      PTT - PT - INR Trend  12-11-21 @ 10:05   -   35.0 - 12.4 - 1.04    Glucose Trend  12-11-21 @ 10:05   -  -- 129<H> --                PHYSICAL EXAM:    General: cooperative  CVS: RR  Pulm: CTAB  GI: Soft, NTND  Extremities: No LE Edema  Neuro: AOx2 (self, place w/ choices, not date), expressive aphasia, FC, L gaze preference, R facial, RUE extension, RLE 1/5, Lt side 5/5   NSCU Progress Note    Assessment/Hospital Course:    55F w/ no sig pmh, not on AC/AP found down by daughter w/ R weakness and slurred speech after hearing a sound found to have a L 4.5 x 2.4 x 3.6 cm BG IPH with no shift, CTA/angio negative for vascular lesion, likely in the s/o uncontrolled HTN.    ICH score 1    24 Hour Events/Subjective:  - L BG heme ICH score 1, angio negative  - elevated leuks, likely reactive, no focal infectious source, on RA      REVIEW OF SYSTEMS:  - negative except as above    VITALS:   - T(C): 37.2 (12-11-21 @ 16:15), Max: 37.2 (12-11-21 @ 16:15)  T(F): 98.9 (12-11-21 @ 16:15), Max: 98.9 (12-11-21 @ 16:15)  HR: 70 (12-11-21 @ 18:00) (67 - 98)  BP: 152/95 (12-11-21 @ 18:00) (115/83 - 160/85)  ABP: --  ABP(mean): --  RR: 15 (12-11-21 @ 18:00) (13 - 18)  SpO2: 99% (12-11-21 @ 18:00) (94% - 99%)      IMAGING/DATA:   - COVID      COVID Biomarkers            Trend Cardiac Enzymes    Trend BNP    Procalcitonin Trend    WBC Trend  12-11-21 @ 10:05   -  12.36<H>    H/H Trend  12-11-21 @ 10:05   -   15.3/ 46.8<H>    Stool Occult Blood    Platelet Trend  12-11-21 @ 10:05   -  323    Trend Sodium  12-11-21 @ 10:05   -  137    Trend Potassium  12-11-21 @ 10:05   -  3.9    Trend Bun/Cr  12-11-21 @ 10:05  BUN/CR -  14 / 0.40<L>    Lactic Acid Trend    ABG Trend    Trend AST/ALT/ALK Phos/Bili  12-11-21 @ 10:05   32/36/177<H>/0.5      Ammonia Trend      Amylase / Lipase Trend      Albumin Trend  12-11-21 @ 10:05   -   4.1      PTT - PT - INR Trend  12-11-21 @ 10:05   -   35.0 - 12.4 - 1.04    Glucose Trend  12-11-21 @ 10:05   -  -- 129<H> --                PHYSICAL EXAM:    General: cooperative  CVS: RR  Pulm: CTAB  GI: Soft, NTND  Extremities: No LE Edema  Neuro: AOx2 (self, place w/ choices), expressive aphasia, FC, L gaze preference unable to overcome, R facial, RUE extension, RLE 1/5, Lt side 5/5   NSCU Progress Note    Assessment/Hospital Course:    55F w/ no sig pmh, not on AC/AP found down by daughter w/ R weakness and slurred speech after hearing a sound found to have a L 4.5 x 2.4 x 3.6 cm BG IPH with no shift, CTA/angio negative for vascular lesion, likely in the s/o uncontrolled HTN.    ICH score 1    24 Hour Events/Subjective:  - L BG heme ICH score 1, angio negative  - elevated leuks, likely reactive, no focal infectious source, on RA      REVIEW OF SYSTEMS:  - negative except as above    VITALS:   - T(C): 37.2 (12-11-21 @ 16:15), Max: 37.2 (12-11-21 @ 16:15)  T(F): 98.9 (12-11-21 @ 16:15), Max: 98.9 (12-11-21 @ 16:15)  HR: 70 (12-11-21 @ 18:00) (67 - 98)  BP: 152/95 (12-11-21 @ 18:00) (115/83 - 160/85)  ABP: --  ABP(mean): --  RR: 15 (12-11-21 @ 18:00) (13 - 18)  SpO2: 99% (12-11-21 @ 18:00) (94% - 99%)      IMAGING/DATA:   - COVID      COVID Biomarkers            Trend Cardiac Enzymes    Trend BNP    Procalcitonin Trend    WBC Trend  12-11-21 @ 10:05   -  12.36<H>    H/H Trend  12-11-21 @ 10:05   -   15.3/ 46.8<H>    Stool Occult Blood    Platelet Trend  12-11-21 @ 10:05   -  323    Trend Sodium  12-11-21 @ 10:05   -  137    Trend Potassium  12-11-21 @ 10:05   -  3.9    Trend Bun/Cr  12-11-21 @ 10:05  BUN/CR -  14 / 0.40<L>    Lactic Acid Trend    ABG Trend    Trend AST/ALT/ALK Phos/Bili  12-11-21 @ 10:05   32/36/177<H>/0.5      Ammonia Trend      Amylase / Lipase Trend      Albumin Trend  12-11-21 @ 10:05   -   4.1      PTT - PT - INR Trend  12-11-21 @ 10:05   -   35.0 - 12.4 - 1.04    Glucose Trend  12-11-21 @ 10:05   -  -- 129<H> --                PHYSICAL EXAM:    General: cooperative  CVS: RR  Pulm: CTAB  GI: Soft, NTND  Extremities: No LE Edema  Neuro: AOx2 (self, place w/ choices), expressive aphasia, FC, L gaze preference unable to overcome, R facial, RUE WD, RLE 2/5, Lt side 5/5

## 2021-12-11 NOTE — DISCHARGE NOTE NURSING/CASE MANAGEMENT/SOCIAL WORK - PATIENT PORTAL LINK FT
You can access the FollowMyHealth Patient Portal offered by [facility] by registering at the following website: [email or web address]. By joining [facility]’s FollowMyHealth portal, you will also be able to view your health information using other applications (apps) compatible with our system.

## 2021-12-11 NOTE — ED ADULT NURSE REASSESSMENT NOTE - NS ED NURSE REASSESS COMMENT FT1
Patient opens eyes to tactile/verbal stimulation but during neuro exam,, she required continuous verbal stimulation to complete exam.  In no apparent distress.  Neuro exam complete.  Patient now able to minimally move right leg.  Her speech is incomprehensible and she is unable to answer any questions.  Family at bedside, patient states, random words.  Vitals taken and will continue to monitor patient.

## 2021-12-11 NOTE — H&P ADULT - HISTORY OF PRESENT ILLNESS
55F w/ no sig pmh, not on AC/AP found down by daughter w/ R weakness and slurred speech after hearing a sound found to have a L 4.5 x 2.4 x 3.6 cm BG IPH with no shift, CTA neg. 10hr repeat on Left done at [facility] stable. LKN 10:30 pm.  Exam: awake, eomi, pupils 3R b/l, R facial, FC, expressive aphasia, R hemiparesis 2/5, L side 5/5, no sensation on Right side.  -Adm NSCU, SBP<160  -Hold AC/AP,  -Keppra 500 bid  -Preop Angio today

## 2021-12-11 NOTE — PATIENT PROFILE ADULT - FALL HARM RISK - FALLEN IN PAST
No Benzoyl Peroxide Pregnancy And Lactation Text: This medication is Pregnancy Category C. It is unknown if benzoyl peroxide is excreted in breast milk. Include Pregnancy/Lactation Warning?: No Birth Control Pills Counseling: Birth Control Pill Counseling: I discussed with the patient the potential side effects of OCPs including but not limited to increased risk of stroke, heart attack, thrombophlebitis, deep venous thrombosis, hepatic adenomas, breast changes, GI upset, headaches, and depression. The patient verbalized understanding of the proper use and possible adverse effects of OCPs. All of the patient's questions and concerns were addressed. Spironolactone Pregnancy And Lactation Text: This medication can cause feminization of the male fetus and should be avoided during pregnancy. The active metabolite is also found in breast milk. Azithromycin Counseling:  I discussed with the patient the risks of azithromycin including but not limited to GI upset, allergic reaction, drug rash, diarrhea, and yeast infections. Isotretinoin Counseling: Patient should get monthly blood tests, not donate blood, not drive at night if vision affected, not share medication, and not undergo elective surgery for 6 months after tx completed. Side effects reviewed, pt to contact office should one occur. Spironolactone Counseling: Patient advised regarding risks of diarrhea, abdominal pain, hyperkalemia, birth defects (for female patients), liver toxicity and renal toxicity. The patient may need blood work to monitor liver and kidney function and potassium levels while on therapy. The patient verbalized understanding of the proper use and possible adverse effects of spironolactone. All of the patient's questions and concerns were addressed. Topical Sulfur Applications Pregnancy And Lactation Text: This medication is Pregnancy Category C and has an unknown safety profile during pregnancy. It is unknown if this topical medication is excreted in breast milk. Minocycline Counseling: Patient advised regarding possible photosensitivity and discoloration of the teeth, skin, lips, tongue and gums. Patient instructed to avoid sunlight, if possible. When exposed to sunlight, patients should wear protective clothing, sunglasses, and sunscreen. The patient was instructed to call the office immediately if the following severe adverse effects occur:  hearing changes, easy bruising/bleeding, severe headache, or vision changes. The patient verbalized understanding of the proper use and possible adverse effects of minocycline. All of the patient's questions and concerns were addressed. Tazorac Pregnancy And Lactation Text: This medication is not safe during pregnancy. It is unknown if this medication is excreted in breast milk. Azithromycin Pregnancy And Lactation Text: This medication is considered safe during pregnancy and is also secreted in breast milk. Isotretinoin Pregnancy And Lactation Text: This medication is Pregnancy Category X and is considered extremely dangerous during pregnancy. It is unknown if it is excreted in breast milk. Topical Retinoid counseling:  Patient advised to apply a pea-sized amount only at bedtime and wait 30 minutes after washing their face before applying. If too drying, patient may add a non-comedogenic moisturizer. The patient verbalized understanding of the proper use and possible adverse effects of retinoids. All of the patient's questions and concerns were addressed. Doxycycline Pregnancy And Lactation Text: This medication is Pregnancy Category D and not consider safe during pregnancy. It is also excreted in breast milk but is considered safe for shorter treatment courses. Topical Clindamycin Counseling: Patient counseled that this medication may cause skin irritation or allergic reactions. In the event of skin irritation, the patient was advised to reduce the amount of the drug applied or use it less frequently. The patient verbalized understanding of the proper use and possible adverse effects of clindamycin. All of the patient's questions and concerns were addressed. Doxycycline Counseling:  Patient counseled regarding possible photosensitivity and increased risk for sunburn. Patient instructed to avoid sunlight, if possible. When exposed to sunlight, patients should wear protective clothing, sunglasses, and sunscreen. The patient was instructed to call the office immediately if the following severe adverse effects occur:  hearing changes, easy bruising/bleeding, severe headache, or vision changes. The patient verbalized understanding of the proper use and possible adverse effects of doxycycline. All of the patient's questions and concerns were addressed. Birth Control Pills Pregnancy And Lactation Text: This medication should be avoided if pregnant and for the first 30 days post-partum. Minocycline Pregnancy And Lactation Text: This medication is Pregnancy Category D and not consider safe during pregnancy. It is also excreted in breast milk. Topical Retinoid Pregnancy And Lactation Text: This medication is Pregnancy Category C. It is unknown if this medication is excreted in breast milk. Erythromycin Counseling:  I discussed with the patient the risks of erythromycin including but not limited to GI upset, allergic reaction, drug rash, diarrhea, increase in liver enzymes, and yeast infections. Bactrim Counseling:  I discussed with the patient the risks of sulfa antibiotics including but not limited to GI upset, allergic reaction, drug rash, diarrhea, dizziness, photosensitivity, and yeast infections. Rarely, more serious reactions can occur including but not limited to aplastic anemia, agranulocytosis, methemoglobinemia, blood dyscrasias, liver or kidney failure, lung infiltrates or desquamative/blistering drug rashes. Tetracycline Counseling: Patient counseled regarding possible photosensitivity and increased risk for sunburn. Patient instructed to avoid sunlight, if possible. When exposed to sunlight, patients should wear protective clothing, sunglasses, and sunscreen. The patient was instructed to call the office immediately if the following severe adverse effects occur:  hearing changes, easy bruising/bleeding, severe headache, or vision changes. The patient verbalized understanding of the proper use and possible adverse effects of tetracycline. All of the patient's questions and concerns were addressed. Patient understands to avoid pregnancy while on therapy due to potential birth defects. Sarecycline Counseling: Patient advised regarding possible photosensitivity and discoloration of the teeth, skin, lips, tongue and gums. Patient instructed to avoid sunlight, if possible. When exposed to sunlight, patients should wear protective clothing, sunglasses, and sunscreen. The patient was instructed to call the office immediately if the following severe adverse effects occur:  hearing changes, easy bruising/bleeding, severe headache, or vision changes. The patient verbalized understanding of the proper use and possible adverse effects of sarecycline. All of the patient's questions and concerns were addressed. Topical Clindamycin Pregnancy And Lactation Text: This medication is Pregnancy Category B and is considered safe during pregnancy. It is unknown if it is excreted in breast milk. High Dose Vitamin A Counseling: Side effects reviewed, pt to contact office should one occur. Detail Level: Simple Bactrim Pregnancy And Lactation Text: This medication is Pregnancy Category D and is known to cause fetal risk. It is also excreted in breast milk. High Dose Vitamin A Pregnancy And Lactation Text: High dose vitamin A therapy is contraindicated during pregnancy and breast feeding. Tazorac Counseling:  Patient advised that medication is irritating and drying. Patient may need to apply sparingly and wash off after an hour before eventually leaving it on overnight. The patient verbalized understanding of the proper use and possible adverse effects of tazorac. All of the patient's questions and concerns were addressed. Erythromycin Pregnancy And Lactation Text: This medication is Pregnancy Category B and is considered safe during pregnancy. It is also excreted in breast milk. Benzoyl Peroxide Counseling: Patient counseled that medicine may cause skin irritation and bleach clothing. In the event of skin irritation, the patient was advised to reduce the amount of the drug applied or use it less frequently. The patient verbalized understanding of the proper use and possible adverse effects of benzoyl peroxide. All of the patient's questions and concerns were addressed. Dapsone Pregnancy And Lactation Text: This medication is Pregnancy Category C and is not considered safe during pregnancy or breast feeding. Dapsone Counseling: I discussed with the patient the risks of dapsone including but not limited to hemolytic anemia, agranulocytosis, rashes, methemoglobinemia, kidney failure, peripheral neuropathy, headaches, GI upset, and liver toxicity. Patients who start dapsone require monitoring including baseline LFTs and weekly CBCs for the first month, then every month thereafter. The patient verbalized understanding of the proper use and possible adverse effects of dapsone. All of the patient's questions and concerns were addressed. Topical Sulfur Applications Counseling: Topical Sulfur Counseling: Patient counseled that this medication may cause skin irritation or allergic reactions. In the event of skin irritation, the patient was advised to reduce the amount of the drug applied or use it less frequently. The patient verbalized understanding of the proper use and possible adverse effects of topical sulfur application. All of the patient's questions and concerns were addressed.

## 2021-12-11 NOTE — CHART NOTE - NSCHARTNOTEFT_GEN_A_CORE
Interventional Neuro Radiology  Pre-Procedure Note PA-AURY    HPI:  This is a 56F w/ no sig pmh, not on AC/AP found down by daughter today w/ R weakness and slurred speech after hearing a sound found to have a L 4.5 x 2.4 x 3.6 cm BG IPH with no shift, CTA neg. 10hr repeat on Left done at [facility] stable. LKN 10:30 pm.  Exam: awake, eomi, pupils 3R b/l, R facial, FC, expressive aphasia, R hemiparesis 0/5, L side 5/5, no sensation on Right side.  -Adm NSCU, SBP<160 -Hold AC/AP, -Keppra 500 bid  Patient transferred from [facility] ER to IR for diagnostic cerebral angiogram.      Allergies: No Known Allergies      PAST MEDICAL & SURGICAL HISTORY:  No pertinent past medical history    No significant past surgical history        Social History: unknown    FAMILY HISTORY: unknown      Current Medications: acetaminophen       Tablet .. 650 milliGRAM(s) Oral every 6 hours PRN  levETIRAcetam 500 milliGRAM(s) Oral every 12 hours  niCARdipine Infusion 5 mG/Hr IV Continuous <Continuous>  ondansetron Injectable 4 milliGRAM(s) IV Push every 6 hours PRN  pantoprazole    Tablet 40 milliGRAM(s) Oral before breakfast  senna 2 Tablet(s) Oral at bedtime PRN  sodium chloride 0.9%. 1000 milliLiter(s) IV Continuous <Continuous>      Labs:                         15.3   12.36 )-----------( 323                46.8       137  |  104  |  14  ----------------------------<  129<H>  3.9   |  20<L>  |  0.40<L>      HCG Quantitative, Serum: <2.0 mIU/mL (12-11 @ 10:05)      Blood Bank: 12-11-21  B Positive    Assessment/Plan:   This is a 56y  year old female with left basal ganglia hemorrhage.  Patient presents to neuro-IR for selective cerebral angiography.   Procedure, goals, risks, benefits and alternatives  were discussed with patient and patient's family.  All questions were answered.  Risks include but are not limited to stroke, vessel injury, hemorrhage, and or right  groin hematoma.  Patient;s sister demonstrates understanding  of all risks involved with this procedure and wishes to continue.   Appropriate  consent was obtained from patient;s sister and consent is in the patient's chart.s

## 2021-12-11 NOTE — CONSULT NOTE ADULT - SUBJECTIVE AND OBJECTIVE BOX
p ([id number])     HPI:  55F no known PMH BIBEMS for concern of stroke. As per daughter at bedside, heard a thump in the living room, found pt on the floor on her stomach ~40min PTA. Living room carpeted, daughter belives she would have fallen off the couch. Pt has since been with slurred speech, not moving her R-side spontaneously. Not on AC. Was in normal state of health prior to this.    Imaging:  L Basal ganglia iph with no shift, CTA negative.     Exam:  awake, eomi, pupils 3R b/l, R facial, FC, expressive aphasia, R hemiparesis 2/5, L side 5/5.    --Anticoagulation:    =====================  PAST MEDICAL HISTORY   No pertinent past medical history      PAST SURGICAL HISTORY         MEDICATIONS:  Antibiotics:    Neuro:    Other:      SOCIAL HISTORY:   Occupation:   Marital Status:     FAMILY HISTORY:      ROS: Negative except per HPI    LABS:  PT/INR - ( 10 Dec 2021 23:56 )   PT: 12.0 sec;   INR: 1.05 ratio         PTT - ( 10 Dec 2021 23:56 )  PTT:32.7 sec                        15.7   9.93  )-----------( 301      ( 10 Dec 2021 23:56 )             48.5     12-10    136  |  99  |  17  ----------------------------<  167<H>  4.0   |  25  |  0.88    Ca    9.6      10 Dec 2021 23:56    TPro  7.1  /  Alb  4.2  /  TBili  0.4  /  DBili  x   /  AST  40<H>  /  ALT  36<H>  /  AlkPhos  183<H>  12-10

## 2021-12-11 NOTE — CONSULT NOTE ADULT - SUBJECTIVE AND OBJECTIVE BOX
**INCOMPLETE**   **INCOMPLETE**    KIRAN FERNANDEZ  55y Female  MRN-[id number]      HPI:  55 y.o. RH W with no known PMH BIBEMS with R sided weakness. LKN 23:00. Patient was found down by her family on her living room floor after they heard a thud. Patient was on her stomach, and subsequently noted to not be moving her L side and to have slurred speech. Patient had reported c/o fatigue just prior to the episode. To family's knowledge, she is not on any medications at home, including AC/AP therapy. She has not followed up with her PCP in nearly two years due to COVID. 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     LKN 23:00 on 12/10/21  NIHSS: 14  Baseline mRS: 0  Not a candidate for tPA or MT due to IPH.    REVIEW OF SYSTEMS:  Unable to perform due to patient's mental status.       PAST MEDICAL & SURGICAL HISTORY:  No pertinent past medical history        FAMILY HISTORY:      SOCIAL HISTORY:       MEDICATIONS    Home Medications:    MEDICATIONS  (STANDING):    MEDICATIONS  (PRN):      Allergies  No Known Allergies      VITALS & EXAMINATION      Weight (kg): 86.5 (12-11 @ 00:23)    Vital Signs Last 24 Hrs  T(C): 36.7 (11 Dec 2021 06:22), Max: 37.1 (11 Dec 2021 00:00)  T(F): 98 (11 Dec 2021 06:22), Max: 98.7 (11 Dec 2021 00:00)  HR: 94 (11 Dec 2021 06:22) (94 - 119)  BP: 131/90 (11 Dec 2021 06:22) (131/90 - 155/103)  RR: 15 (11 Dec 2021 06:22) (14 - 20)  SpO2: 98% (11 Dec 2021 06:22) (95% - 99%)    **INCOMPLETE**      LABS:    CBC                       15.7   9.93  )-----------( 301      ( 10 Dec 2021 23:56 )             48.5     Chem 12-10    136  |  99  |  17  ----------------------------<  167<H>  4.0   |  25  |  0.88    Ca    9.6      10 Dec 2021 23:56    TPro  7.1  /  Alb  4.2  /  TBili  0.4  /  DBili  x   /  AST  40<H>  /  ALT  36<H>  /  AlkPhos  183<H>  12-10    Coags PT/INR - ( 10 Dec 2021 23:56 )   PT: 12.0 sec;   INR: 1.05 ratio         PTT - ( 10 Dec 2021 23:56 )  PTT:32.7 sec  LFTs LIVER FUNCTIONS - ( 10 Dec 2021 23:56 )  Alb: 4.2 g/dL / Pro: 7.1 g/dL / ALK PHOS: 183 U/L / ALT: 36 U/L / AST: 40 U/L / GGT: x           Lipid Panel   A1c   Cardiac enzymes     U/A     STUDIES & IMAGING  < from: CT Brain Stroke Protocol (12.10.21 @ 23:50) >  FINDINGS:  The study is mildly degraded by motion/streak artifact.    Acute intraparenchymal hemorrhage within the left basal ganglia measuring approximately 4.5 x 2.4 x 3.6 cm (ap x trv x cc). There is surrounding vasogenic edema with mass effect and partial effacement of the left lateral ventricle.    The basal cisterns are patent. There is no hydrocephalus. There is no midline shift.    The visualized paranasal sinuses and mastoid air cells are clear.    The calvarium is intact.    IMPRESSION:  Acute left basal ganglia intraparenchymal hematoma with partial effacement of the left lateral ventricle. No midline shift.    < end of copied text >  < from: CT Angio Head w/ IV Cont (12.11.21 @ 00:06) >    FINDINGS:    CT ANGIOGRAPHY NECK:  There is no evidence for significant stenosis or major vessel occlusion involving the bilateral carotid arteries.    There is no evidence for significant stenosis or major vessel occlusion involving the bilateral vertebral arteries. Left dominant vertebral   system.    The neck and upper chest are unremarkable.    There are degenerative changes in visualized spine.      CT ANGIOGRAPHY BRAIN:  There are mild atherosclerotic calcification of the right cavernous ICA.   There is no evidence for significant stenosis, major vessel occlusion, or aneurysm about the [place].    There is no evidence for significant stenosis, major vessel occlusion, or aneurysm involving the vertebrobasilar system.    No enlarged vascular lesions or clusters of abnormal vessels are noted to suggest an arterial venous malformation within the field-of-view.    Visualized portions of the superficial and deep venous systems are unremarkable.      IMPRESSION:    CT angiography neck:  No evidence of hemodynamically significant stenosis using NASCET criteria. Patent vertebral arteries. No evidence of vascular dissection.    CT angiography brain:  No major vessel occlusion or proximal stenosis. No dissection, saccular aneurysm, or AVM.    --- End of Report ---    < end of copied text >     **INCOMPLETE**    KIRAN FERNANDEZ  55y Female  MRN-[id number]      HPI:  55 y.o. RH W with no known PMH BIBEMS with R sided weakness. LKN 23:00. Patient was found down by her family on her living room floor after they heard a thud. Patient was on her stomach, and subsequently noted to not be moving her L side and to have slurred speech. Patient had reported c/o fatigue just prior to the episode. To family's knowledge, she is not on any medications at home, including AC/AP therapy. She has not followed up with her PCP in nearly two years due to COVID. 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     LKN 23:00 on 12/10/21  NIHSS: 14  Baseline mRS: 0  Not a candidate for tPA or MT due to IPH.    REVIEW OF SYSTEMS:  Unable to perform due to patient's mental status.       PAST MEDICAL & SURGICAL HISTORY:  No pertinent past medical history        FAMILY HISTORY:      SOCIAL HISTORY:       MEDICATIONS    Home Medications:    MEDICATIONS  (STANDING):    MEDICATIONS  (PRN):      Allergies  No Known Allergies      VITALS & EXAMINATION      Weight (kg): 86.5 (12-11 @ 00:23)    Vital Signs Last 24 Hrs  T(C): 36.7 (11 Dec 2021 06:22), Max: 37.1 (11 Dec 2021 00:00)  T(F): 98 (11 Dec 2021 06:22), Max: 98.7 (11 Dec 2021 00:00)  HR: 94 (11 Dec 2021 06:22) (94 - 119)  BP: 131/90 (11 Dec 2021 06:22) (131/90 - 155/103)  RR: 15 (11 Dec 2021 06:22) (14 - 20)  SpO2: 98% (11 Dec 2021 06:22) (95% - 99%)    **INCOMPLETE**    Mental status: alert, not following commands  Language: speech is mild-moderately dysarthric, moderately-severely impaired comprehension  CN's: CVF full, EOMI, V1-V3 intact b/l, mild R facial droop  Motor: RUE: no effort against gravity, LUE: no motor drift, RLE: no effort against gravity LLE: no motor drift  Coordination: no dysmetria to FTN, unable to assess on right  Sensory: intact to LT b/l  Extinction to DSS: none  Gait: patient could not participate     LABS:    CBC                       15.7   9.93  )-----------( 301      ( 10 Dec 2021 23:56 )             48.5     Chem 12-10    136  |  99  |  17  ----------------------------<  167<H>  4.0   |  25  |  0.88    Ca    9.6      10 Dec 2021 23:56    TPro  7.1  /  Alb  4.2  /  TBili  0.4  /  DBili  x   /  AST  40<H>  /  ALT  36<H>  /  AlkPhos  183<H>  12-10    Coags PT/INR - ( 10 Dec 2021 23:56 )   PT: 12.0 sec;   INR: 1.05 ratio         PTT - ( 10 Dec 2021 23:56 )  PTT:32.7 sec  LFTs LIVER FUNCTIONS - ( 10 Dec 2021 23:56 )  Alb: 4.2 g/dL / Pro: 7.1 g/dL / ALK PHOS: 183 U/L / ALT: 36 U/L / AST: 40 U/L / GGT: x           Lipid Panel   A1c   Cardiac enzymes     U/A     STUDIES & IMAGING  < from: CT Brain Stroke Protocol (12.10.21 @ 23:50) >  FINDINGS:  The study is mildly degraded by motion/streak artifact.    Acute intraparenchymal hemorrhage within the left basal ganglia measuring approximately 4.5 x 2.4 x 3.6 cm (ap x trv x cc). There is surrounding vasogenic edema with mass effect and partial effacement of the left lateral ventricle.    The basal cisterns are patent. There is no hydrocephalus. There is no midline shift.    The visualized paranasal sinuses and mastoid air cells are clear.    The calvarium is intact.    IMPRESSION:  Acute left basal ganglia intraparenchymal hematoma with partial effacement of the left lateral ventricle. No midline shift.    < end of copied text >  < from: CT Angio Head w/ IV Cont (12.11.21 @ 00:06) >    FINDINGS:    CT ANGIOGRAPHY NECK:  There is no evidence for significant stenosis or major vessel occlusion involving the bilateral carotid arteries.    There is no evidence for significant stenosis or major vessel occlusion involving the bilateral vertebral arteries. Left dominant vertebral   system.    The neck and upper chest are unremarkable.    There are degenerative changes in visualized spine.      CT ANGIOGRAPHY BRAIN:  There are mild atherosclerotic calcification of the right cavernous ICA.   There is no evidence for significant stenosis, major vessel occlusion, or aneurysm about the [place].    There is no evidence for significant stenosis, major vessel occlusion, or aneurysm involving the vertebrobasilar system.    No enlarged vascular lesions or clusters of abnormal vessels are noted to suggest an arterial venous malformation within the field-of-view.    Visualized portions of the superficial and deep venous systems are unremarkable.      IMPRESSION:    CT angiography neck:  No evidence of hemodynamically significant stenosis using NASCET criteria. Patent vertebral arteries. No evidence of vascular dissection.    CT angiography brain:  No major vessel occlusion or proximal stenosis. No dissection, saccular aneurysm, or AVM.    --- End of Report ---    < end of copied text >   KIRAN FERNANDEZ  55y Female  MRN-[id number]      HPI:  55 y.o. RH W with no known PMH BIBEMS with R sided weakness. LKN 23:00. Patient was found down by her family on her living room floor after they heard a thud. Patient was on her stomach, and subsequently noted to not be moving her L side and to have slurred speech. Patient had reported c/o fatigue just prior to the episode. To family's knowledge, she is not on any medications at home, including AC/AP therapy. She has not followed up with her PCP in nearly two years due to COVID. 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     LKN 23:00 on 12/10/21  NIHSS: 14  Baseline mRS: 0  Not a candidate for tPA or MT due to IPH.    REVIEW OF SYSTEMS:  Unable to perform due to patient's mental status.       PAST MEDICAL & SURGICAL HISTORY:  No pertinent past medical history      FAMILY HISTORY:      SOCIAL HISTORY:       MEDICATIONS    Home Medications:    MEDICATIONS  (STANDING):    MEDICATIONS  (PRN):      Allergies  No Known Allergies      VITALS & EXAMINATION    Weight (kg): 86.5 (12-11 @ 00:23)    Vital Signs Last 24 Hrs  T(C): 36.7 (11 Dec 2021 06:22), Max: 37.1 (11 Dec 2021 00:00)  T(F): 98 (11 Dec 2021 06:22), Max: 98.7 (11 Dec 2021 00:00)  HR: 94 (11 Dec 2021 06:22) (94 - 119)  BP: 131/90 (11 Dec 2021 06:22) (131/90 - 155/103)  RR: 15 (11 Dec 2021 06:22) (14 - 20)  SpO2: 98% (11 Dec 2021 06:22) (95% - 99%)    Mental status: Eyes open, alert, not following verbal commands, intermittently able to mimic demonstrated commands (eg, smile).  Language: Speech is mild-moderately dysarthric, non-fluent, moderately-severely impaired comprehension and production.   CN's: PERRL (3mm OU), CVF full, EOMI however slight L gaze preference, mild R facial droop, gag deferred.  Motor: RUE: no effort against gravity, LUE: no motor drift, RLE: no effort against gravity LLE: no motor drift  Coordination: Patient unable to participate due to impaired comprehension.  Sensory: Grimaces to noxious stimuli in all extremities.  Extinction to DSS: Patient unable to participate.  Reflexes: 2+ biceps, brachioradialis and patellar b/l.   Coordination: Patient unable to participate due to impaired comprehension.  Gait: Patient unable to participate.     LABS:    CBC                       15.7   9.93  )-----------( 301      ( 10 Dec 2021 23:56 )             48.5     Chem 12-10    136  |  99  |  17  ----------------------------<  167<H>  4.0   |  25  |  0.88    Ca    9.6      10 Dec 2021 23:56    TPro  7.1  /  Alb  4.2  /  TBili  0.4  /  DBili  x   /  AST  40<H>  /  ALT  36<H>  /  AlkPhos  183<H>  12-10    Coags PT/INR - ( 10 Dec 2021 23:56 )   PT: 12.0 sec;   INR: 1.05 ratio         PTT - ( 10 Dec 2021 23:56 )  PTT:32.7 sec  LFTs LIVER FUNCTIONS - ( 10 Dec 2021 23:56 )  Alb: 4.2 g/dL / Pro: 7.1 g/dL / ALK PHOS: 183 U/L / ALT: 36 U/L / AST: 40 U/L / GGT: x           Lipid Panel   A1c   Cardiac enzymes     U/A     STUDIES & IMAGING  < from: CT Brain Stroke Protocol (12.10.21 @ 23:50) >  FINDINGS:  The study is mildly degraded by motion/streak artifact.    Acute intraparenchymal hemorrhage within the left basal ganglia measuring approximately 4.5 x 2.4 x 3.6 cm (ap x trv x cc). There is surrounding vasogenic edema with mass effect and partial effacement of the left lateral ventricle.    The basal cisterns are patent. There is no hydrocephalus. There is no midline shift.    The visualized paranasal sinuses and mastoid air cells are clear.    The calvarium is intact.    IMPRESSION:  Acute left basal ganglia intraparenchymal hematoma with partial effacement of the left lateral ventricle. No midline shift.    < end of copied text >  < from: CT Angio Head w/ IV Cont (12.11.21 @ 00:06) >    FINDINGS:    CT ANGIOGRAPHY NECK:  There is no evidence for significant stenosis or major vessel occlusion involving the bilateral carotid arteries.    There is no evidence for significant stenosis or major vessel occlusion involving the bilateral vertebral arteries. Left dominant vertebral   system.    The neck and upper chest are unremarkable.    There are degenerative changes in visualized spine.      CT ANGIOGRAPHY BRAIN:  There are mild atherosclerotic calcification of the right cavernous ICA.   There is no evidence for significant stenosis, major vessel occlusion, or aneurysm about the [place].    There is no evidence for significant stenosis, major vessel occlusion, or aneurysm involving the vertebrobasilar system.    No enlarged vascular lesions or clusters of abnormal vessels are noted to suggest an arterial venous malformation within the field-of-view.    Visualized portions of the superficial and deep venous systems are unremarkable.      IMPRESSION:    CT angiography neck:  No evidence of hemodynamically significant stenosis using NASCET criteria. Patent vertebral arteries. No evidence of vascular dissection.    CT angiography brain:  No major vessel occlusion or proximal stenosis. No dissection, saccular aneurysm, or AVM.    --- End of Report ---    < end of copied text >   KIRAN FERNANDEZ  55y Female  MRN-[id number]      HPI:  55 y.o. RH W with no known PMH BIBEMS with R sided weakness. LKN 23:00. Patient was found down by her family on her living room floor after they heard a thud. Patient was on her stomach, and subsequently noted to not be moving her L side and to have slurred speech. Patient had reported c/o fatigue just prior to the episode. To family's knowledge, she is not on any medications at home, including AC/AP therapy. She has not followed up with her PCP in nearly two years due to COVID. 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     LKN 23:00 on 12/10/21  NIHSS: 14  Baseline mRS: 0  Not a candidate for tPA or MT due to IPH.    REVIEW OF SYSTEMS:  Unable to perform due to patient's mental status.       PAST MEDICAL & SURGICAL HISTORY:  No pertinent past medical history      FAMILY HISTORY:      SOCIAL HISTORY:       MEDICATIONS    Home Medications:    MEDICATIONS  (STANDING):    MEDICATIONS  (PRN):      Allergies  No Known Allergies      VITALS & EXAMINATION    Weight (kg): 86.5 (12-11 @ 00:23)    Vital Signs Last 24 Hrs  T(C): 36.7 (11 Dec 2021 06:22), Max: 37.1 (11 Dec 2021 00:00)  T(F): 98 (11 Dec 2021 06:22), Max: 98.7 (11 Dec 2021 00:00)  HR: 94 (11 Dec 2021 06:22) (94 - 119)  BP: 131/90 (11 Dec 2021 06:22) (131/90 - 155/103)  RR: 15 (11 Dec 2021 06:22) (14 - 20)  SpO2: 98% (11 Dec 2021 06:22) (95% - 99%)    Mental status: Eyes open, alert, not following verbal commands, intermittently able to mimic demonstrated commands (eg, smile).  Language: Speech is mild-moderately dysarthric, non-fluent, moderately-severely impaired comprehension and production.   CN's: PERRL (3mm OU), CVF full, EOMI however slight L gaze preference, mild R facial droop, gag deferred.  Motor: RUE: no effort against gravity, LUE: no motor drift, RLE: no effort against gravity LLE: no motor drift. However withdraws all extremities to noxious stimuli.  Coordination: Patient unable to participate due to impaired comprehension.  Sensory: Grimaces to noxious stimuli in all extremities.  Extinction to DSS: Patient unable to participate.  Reflexes: 2+ biceps, brachioradialis and patellar b/l.   Coordination: Patient unable to participate due to impaired comprehension.  Gait: Patient unable to participate.     LABS:    CBC                       15.7   9.93  )-----------( 301      ( 10 Dec 2021 23:56 )             48.5     Chem 12-10    136  |  99  |  17  ----------------------------<  167<H>  4.0   |  25  |  0.88    Ca    9.6      10 Dec 2021 23:56    TPro  7.1  /  Alb  4.2  /  TBili  0.4  /  DBili  x   /  AST  40<H>  /  ALT  36<H>  /  AlkPhos  183<H>  12-10    Coags PT/INR - ( 10 Dec 2021 23:56 )   PT: 12.0 sec;   INR: 1.05 ratio         PTT - ( 10 Dec 2021 23:56 )  PTT:32.7 sec  LFTs LIVER FUNCTIONS - ( 10 Dec 2021 23:56 )  Alb: 4.2 g/dL / Pro: 7.1 g/dL / ALK PHOS: 183 U/L / ALT: 36 U/L / AST: 40 U/L / GGT: x           Lipid Panel   A1c   Cardiac enzymes     U/A     STUDIES & IMAGING  < from: CT Brain Stroke Protocol (12.10.21 @ 23:50) >  FINDINGS:  The study is mildly degraded by motion/streak artifact.    Acute intraparenchymal hemorrhage within the left basal ganglia measuring approximately 4.5 x 2.4 x 3.6 cm (ap x trv x cc). There is surrounding vasogenic edema with mass effect and partial effacement of the left lateral ventricle.    The basal cisterns are patent. There is no hydrocephalus. There is no midline shift.    The visualized paranasal sinuses and mastoid air cells are clear.    The calvarium is intact.    IMPRESSION:  Acute left basal ganglia intraparenchymal hematoma with partial effacement of the left lateral ventricle. No midline shift.    < end of copied text >  < from: CT Angio Head w/ IV Cont (12.11.21 @ 00:06) >    FINDINGS:    CT ANGIOGRAPHY NECK:  There is no evidence for significant stenosis or major vessel occlusion involving the bilateral carotid arteries.    There is no evidence for significant stenosis or major vessel occlusion involving the bilateral vertebral arteries. Left dominant vertebral   system.    The neck and upper chest are unremarkable.    There are degenerative changes in visualized spine.      CT ANGIOGRAPHY BRAIN:  There are mild atherosclerotic calcification of the right cavernous ICA.   There is no evidence for significant stenosis, major vessel occlusion, or aneurysm about the [place].    There is no evidence for significant stenosis, major vessel occlusion, or aneurysm involving the vertebrobasilar system.    No enlarged vascular lesions or clusters of abnormal vessels are noted to suggest an arterial venous malformation within the field-of-view.    Visualized portions of the superficial and deep venous systems are unremarkable.      IMPRESSION:    CT angiography neck:  No evidence of hemodynamically significant stenosis using NASCET criteria. Patent vertebral arteries. No evidence of vascular dissection.    CT angiography brain:  No major vessel occlusion or proximal stenosis. No dissection, saccular aneurysm, or AVM.    --- End of Report ---    < end of copied text >   KIRAN FERNANDEZ  55y Female  MRN-[id number]      HPI:  55 y.o. RH W with no known PMH BIBEMS with R sided weakness. LKN 23:00. Patient was found down by her family on her living room floor after they heard a thud. Patient was on her stomach, and subsequently noted to not be moving her L side and to have slurred speech. Patient had reported c/o fatigue just prior to the episode. To family's knowledge, she is not on any medications at home, including AC/AP therapy. She has not followed up with her PCP in nearly two years due to COVID. 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     LKN 23:00 on 12/10/21  NIHSS: 14  Baseline mRS: 0  Not a candidate for tPA or MT due to IPH.    REVIEW OF SYSTEMS:  Unable to perform due to patient's mental status.       PAST MEDICAL & SURGICAL HISTORY:  No pertinent past medical history      FAMILY HISTORY:      SOCIAL HISTORY:       MEDICATIONS    Home Medications:    MEDICATIONS  (STANDING):    MEDICATIONS  (PRN):      Allergies  No Known Allergies      VITALS & EXAMINATION    Weight (kg): 86.5 (12-11 @ 00:23)    Vital Signs Last 24 Hrs  T(C): 36.7 (11 Dec 2021 06:22), Max: 37.1 (11 Dec 2021 00:00)  T(F): 98 (11 Dec 2021 06:22), Max: 98.7 (11 Dec 2021 00:00)  HR: 94 (11 Dec 2021 06:22) (94 - 119)  BP: 131/90 (11 Dec 2021 06:22) (131/90 - 155/103)  RR: 15 (11 Dec 2021 06:22) (14 - 20)  SpO2: 98% (11 Dec 2021 06:22) (95% - 99%)    Mental status: Eyes open, alert, not following verbal commands, intermittently able to mimic demonstrated commands (eg, smile).  Language: Speech is mild-moderately dysarthric, non-fluent, moderately-severely impaired comprehension and production.   CN's: PERRL (3mm OU), CVF full, EOMI however slight L gaze preference, mild R facial droop, gag deferred.  Motor: RUE: no effort against gravity, LUE: no motor drift, RLE: no effort against gravity LLE: no motor drift. However withdraws all extremities to noxious stimuli.  Sensory: Grimaces to noxious stimuli in all extremities.  Extinction to DSS: Patient unable to participate.  Reflexes: 2+ biceps, brachioradialis and patellar b/l.   Coordination: Patient unable to participate due to impaired comprehension.  Gait: Patient unable to participate.     LABS:    CBC                       15.7   9.93  )-----------( 301      ( 10 Dec 2021 23:56 )             48.5     Chem 12-10    136  |  99  |  17  ----------------------------<  167<H>  4.0   |  25  |  0.88    Ca    9.6      10 Dec 2021 23:56    TPro  7.1  /  Alb  4.2  /  TBili  0.4  /  DBili  x   /  AST  40<H>  /  ALT  36<H>  /  AlkPhos  183<H>  12-10    Coags PT/INR - ( 10 Dec 2021 23:56 )   PT: 12.0 sec;   INR: 1.05 ratio         PTT - ( 10 Dec 2021 23:56 )  PTT:32.7 sec  LFTs LIVER FUNCTIONS - ( 10 Dec 2021 23:56 )  Alb: 4.2 g/dL / Pro: 7.1 g/dL / ALK PHOS: 183 U/L / ALT: 36 U/L / AST: 40 U/L / GGT: x           Lipid Panel   A1c   Cardiac enzymes     U/A     STUDIES & IMAGING  < from: CT Brain Stroke Protocol (12.10.21 @ 23:50) >  FINDINGS:  The study is mildly degraded by motion/streak artifact.    Acute intraparenchymal hemorrhage within the left basal ganglia measuring approximately 4.5 x 2.4 x 3.6 cm (ap x trv x cc). There is surrounding vasogenic edema with mass effect and partial effacement of the left lateral ventricle.    The basal cisterns are patent. There is no hydrocephalus. There is no midline shift.    The visualized paranasal sinuses and mastoid air cells are clear.    The calvarium is intact.    IMPRESSION:  Acute left basal ganglia intraparenchymal hematoma with partial effacement of the left lateral ventricle. No midline shift.    < end of copied text >  < from: CT Angio Head w/ IV Cont (12.11.21 @ 00:06) >    FINDINGS:    CT ANGIOGRAPHY NECK:  There is no evidence for significant stenosis or major vessel occlusion involving the bilateral carotid arteries.    There is no evidence for significant stenosis or major vessel occlusion involving the bilateral vertebral arteries. Left dominant vertebral   system.    The neck and upper chest are unremarkable.    There are degenerative changes in visualized spine.      CT ANGIOGRAPHY BRAIN:  There are mild atherosclerotic calcification of the right cavernous ICA.   There is no evidence for significant stenosis, major vessel occlusion, or aneurysm about the [place].    There is no evidence for significant stenosis, major vessel occlusion, or aneurysm involving the vertebrobasilar system.    No enlarged vascular lesions or clusters of abnormal vessels are noted to suggest an arterial venous malformation within the field-of-view.    Visualized portions of the superficial and deep venous systems are unremarkable.      IMPRESSION:    CT angiography neck:  No evidence of hemodynamically significant stenosis using NASCET criteria. Patent vertebral arteries. No evidence of vascular dissection.    CT angiography brain:  No major vessel occlusion or proximal stenosis. No dissection, saccular aneurysm, or AVM.    --- End of Report ---    < end of copied text >

## 2021-12-11 NOTE — ED PROVIDER NOTE - OBJECTIVE STATEMENT
History limited from pt, history obtained from collateral and chart review.    56 year old female with no pertinent PMH presents as transfer for ICH. Pt noted to have witnessed fall last night, found on carpeted floor by daughter. Pt noted to have slurred speech and right sided weakness s/p fall. Pt in normal state of health prior to episode. Denies any aspirin or AC use. Pt seen at [facility] with CT showing basal ganglia intraparenchymal hemorrhage. Pt given 1000 mg keppra and transferred to [facility] for further evaluation/management. History limited from pt, history obtained from collateral and chart review.    56 year old female with no pertinent PMH presents as transfer for ICH. Pt noted to have witnessed fall last night, found on carpeted floor by daughter. Pt noted to have slurred speech and right sided weakness s/p fall. Pt in normal state of health prior to episode. Denies any aspirin or AC use. Pt seen at [facility] with CT showing basal ganglia intraparenchymal hemorrhage. Pt given 1000 mg keppra and transferred to [facility] for further evaluation/management.    [facility] MRN [id number]

## 2021-12-11 NOTE — CONSULT NOTE ADULT - ASSESSMENT
KIRAN FERNANDEZ  55F w/ no sig pmh, not on AC/AP found down by daughter w/ R weakness and slurred speech after hearing a sound found to have a L 4.5 x 2.4 x 3.6 cm BG IPH with no shift CTA neg. LKN 10:30 pm. BP in ED 180s systolic. Currently with no headache, no nausea/vom.  Exam: awake, eomi, pupils 3R b/l, R facial, FC, expressive aphasia, R hemiparesis 2/5, L side 5/5.  -No acute neurosurgical intervention  -HCT in 4 hours  -Hold AC/AP, no dvt ppx until stability scans.   -Keppra 1000, then 500 bid

## 2021-12-11 NOTE — ED PROVIDER NOTE - PROGRESS NOTE DETAILS
Slim-PGY3: spoke to neurosurgery re: consult, pending recs. Slim-PGY3: spoke to neurosurgery re: consult, requesting interval non-con CT head, pending recs. Slim-PGY3: discussed with NSG, pt to go to angio suite and NSCU.

## 2021-12-11 NOTE — PROGRESS NOTE ADULT - ASSESSMENT
ASSESSMENT/PLAN:    55F p/w L BG heme ICH score 1, CTA/angio negative for vascular lesion, likely 2/2 uncontrolled HTN    NEURO:  s/p angio negative 12/11  - neuro checks q1h  - repeat CTH tonight  - hold AC/AP  - pain control  - SZ ppx: Keppra 500mg BID  - stroke core measures  - Activity: PT/OT; brace    CVS:  - SBP goal 100-160  - cardene prn to goal  - TTE    PULM:  - RA  - IS as tolerated  - aspiration precautions  - CXR    RENAL:  - Fluids: 70cc/h NS until tolerating full diet  - daily IOs    GI:  - Diet: dysphagia screen, ADAT  - Bowel regimen    ENDO:   - FS goal 120-180  - A1c    HEME/ONC:  - SCDs  - Chemoppx: hold d/t fresh bleed  - monitor h/h    ID:  - monitor for fevers  - trend leuks      Patient is at high risk of neurologic deterioration/death due to:  heme expansion, status, brain compression/herniation      Time spent: 35 critical care minutes

## 2021-12-11 NOTE — H&P ADULT - NSHPPHYSICALEXAM_GEN_ALL_CORE
Exam: awake, eomi, pupils 3R b/l, R facial, FC, expressive aphasia, R hemiparesis 2/5, L side 5/5, no sensation on Right side.

## 2021-12-11 NOTE — CHART NOTE - NSCHARTNOTEFT_GEN_A_CORE
CAPRINI SCORE [CLOT] Score on Admission for     AGE RELATED RISK FACTORS                                                       MOBILITY RELATED FACTORS  [x ] Age 41-60 years                                            (1 Point)                  [ ] Bed rest                                                        (1 Point)  [ ] Age: 61-74 years                                           (2 Points)                 [ ] Plaster cast                                                   (2 Points)  [ ] Age= 75 years                                              (3 Points)                 [ ] Bed bound for more than 72 hours                 (2 Points)    DISEASE RELATED RISK FACTORS                                               GENDER SPECIFIC FACTORS  [ ] Edema in the lower extremities                       (1 Point)                  [ ] Pregnancy                                                     (1 Point)  [ ] Varicose veins                                               (1 Point)                  [ ] Post-partum < 6 weeks                                   (1 Point)             [ ] BMI > 25 Kg/m2                                            (1 Point)                  [ ] Hormonal therapy  or oral contraception          (1 Point)                 [ ] Sepsis (in the previous month)                        (1 Point)                  [ ] History of pregnancy complications                 (1 point)  [ ] Pneumonia or serious lung disease                                               [ ] Unexplained or recurrent                     (1 Point)           (in the previous month)                               (1 Point)  [ ] Abnormal pulmonary function test                     (1 Point)                 SURGERY RELATED RISK FACTORS (include planned surgeries)  [ ] Acute myocardial infarction                              (1 Point)                 [ ]  Section                                             (1 Point)  [ ] Congestive heart failure (in the previous month)  (1 Point)         [ ] Minor surgery                                                  (1 Point)   [ ] Inflammatory bowel disease                             (1 Point)                 [ ] Arthroscopic surgery                                        (2 Points)  [ ] Central venous access                                      (2 Points)                [ ] General surgery lasting more than 45 minutes   (2 Points)       [ x] Stroke (in the previous month)                          (5 Points)               [ ] Elective arthroplasty                                         (5 Points)            [ ] current or past malignancy                              (2 Points)                                                                                                       HEMATOLOGY RELATED FACTORS                                                 TRAUMA RELATED RISK FACTORS  [ ] Prior episodes of VTE                                     (3 Points)                [ ] Fracture of the hip, pelvis, or leg                       (5 Points)  [ ] Positive family history for VTE                         (3 Points)                 [ ] Acute spinal cord injury (in the previous month)  (5 Points)  [ ] Prothrombin 20795 A                                     (3 Points)                 [ ] Paralysis  (less than 1 month)                             (5 Points)  [ ] Factor V Leiden                                             (3 Points)                  [ ] Multiple Trauma within 1 month                        (5 Points)  [ ] Lupus anticoagulants                                     (3 Points)                                                           [ ] Anticardiolipin antibodies                               (3 Points)                                                       [ ] High homocysteine in the blood                      (3 Points)                                             [ ] Other congenital or acquired thrombophilia      (3 Points)                                                [ ] Heparin induced thrombocytopenia                  (3 Points)                                          Total Score [    6      ]    Risk:  Very low 0   Low 1 to 2   Moderate 3 to 4   High =5       VTE Prophylasix Recommednations:  [ x] mechanical pneumatic compression devices                                      [ ] contraindicated: _____________________  [ ] chemo prophylasix                                                                                   [ ] contraindicated _____________________    **** HIGH LIKELIHOOD DVT PRESENT ON ADMISSION  [ ] (please order LE dopplers within 24 hours of admission)

## 2021-12-11 NOTE — H&P ADULT - ATTENDING COMMENTS
L BG IPH, negative CTA but young age and no known HTN history. Will do angio to definitively rule out vascular lesion.

## 2021-12-11 NOTE — PRE-ANESTHESIA EVALUATION ADULT - NSANTHPMHFT_GEN_ALL_CORE
56 yo F, no known pmhx, BIBEMS for concern of stroke. As per daughter at bedside, heard a thump in the living room, found pt on the floor on her stomach ~40min PTA. Living room carpeted, daughter belives she would have fallen off the couch. Pt has since been with slurred speech, not moving her R-side spontaneously. Not on AC. Was in normal state of health prior to this.    Imaging:  L Basal ganglia iph with no shift, CTA negative.

## 2021-12-11 NOTE — ED ADULT NURSE REASSESSMENT NOTE - NS ED NURSE REASSESS COMMENT FT1
Report given to IR GLORY Colindres. Receiving RN provided [facility] MRN number, aware of negative covid result at [facility].

## 2021-12-11 NOTE — ED ADULT NURSE NOTE - OBJECTIVE STATEMENT
57 yo F presents to ED A+OX2 via EMS, transfer from [facility] for head bleed. As per EMS, patient was transferred for neurosurgery evaluation after being found to have left basal ganglia hemorrhage. As per sister at bedside, patient was noted around 10pm last night to have slurred speech and right side weakness. Sister states "her daughter saw her collapse on the couch." Patient arrives A+Ox2, lethargic and with slurred speech. Patient able to follow some commands. Breathing spontaneous and unlabored on room air. Skin warm pink and dry. EKG completed, patient NSR on cardiac monitor. Patient placed on continuous blood pressure, pulse ox and ETCO2 monitoring. Patient denies pain, nausea. Non verbal indicators of pain absent.  See paper neuro flow sheet. Dysphagia screen as documented on flow sheet. Daughter at bedside. Bed in lowest position, side rails up.

## 2021-12-11 NOTE — ED ADULT NURSE NOTE - INTERVENTIONS DEFINITIONS
[place] to call system/Call bell, personal items and telephone within reach/Instruct patient to call for assistance/Physically safe environment: no spills, clutter or unnecessary equipment/Stretcher in lowest position, wheels locked, appropriate side rails in place/Monitor gait and stability/Monitor for mental status changes and reorient to person, place, and time/Reinforce activity limits and safety measures with patient and family

## 2021-12-12 LAB
AMPHET UR-MCNC: NEGATIVE — SIGNIFICANT CHANGE UP
ANION GAP SERPL CALC-SCNC: 12 MMOL/L — SIGNIFICANT CHANGE UP (ref 5–17)
BARBITURATES UR SCN-MCNC: NEGATIVE — SIGNIFICANT CHANGE UP
BENZODIAZ UR-MCNC: NEGATIVE — SIGNIFICANT CHANGE UP
BUN SERPL-MCNC: 10 MG/DL — SIGNIFICANT CHANGE UP (ref 7–23)
CALCIUM SERPL-MCNC: 8.8 MG/DL — SIGNIFICANT CHANGE UP (ref 8.4–10.5)
CHLORIDE SERPL-SCNC: 104 MMOL/L — SIGNIFICANT CHANGE UP (ref 96–108)
CHOLEST SERPL-MCNC: 200 MG/DL — HIGH
CO2 SERPL-SCNC: 21 MMOL/L — LOW (ref 22–31)
COCAINE METAB.OTHER UR-MCNC: NEGATIVE — SIGNIFICANT CHANGE UP
CREAT SERPL-MCNC: 0.38 MG/DL — LOW (ref 0.5–1.3)
GLUCOSE BLDC GLUCOMTR-MCNC: 117 MG/DL — HIGH (ref 70–99)
GLUCOSE BLDC GLUCOMTR-MCNC: 125 MG/DL — HIGH (ref 70–99)
GLUCOSE SERPL-MCNC: 134 MG/DL — HIGH (ref 70–99)
HCT VFR BLD CALC: 44.6 % — SIGNIFICANT CHANGE UP (ref 34.5–45)
HDLC SERPL-MCNC: 55 MG/DL — SIGNIFICANT CHANGE UP
HGB BLD-MCNC: 14.8 G/DL — SIGNIFICANT CHANGE UP (ref 11.5–15.5)
LIPID PNL WITH DIRECT LDL SERPL: 122 MG/DL — HIGH
MAGNESIUM SERPL-MCNC: 2.2 MG/DL — SIGNIFICANT CHANGE UP (ref 1.6–2.6)
MCHC RBC-ENTMCNC: 27.8 PG — SIGNIFICANT CHANGE UP (ref 27–34)
MCHC RBC-ENTMCNC: 33.2 GM/DL — SIGNIFICANT CHANGE UP (ref 32–36)
MCV RBC AUTO: 83.8 FL — SIGNIFICANT CHANGE UP (ref 80–100)
METHADONE UR-MCNC: NEGATIVE — SIGNIFICANT CHANGE UP
NON HDL CHOLESTEROL: 145 MG/DL — HIGH
NRBC # BLD: 0 /100 WBCS — SIGNIFICANT CHANGE UP (ref 0–0)
OPIATES UR-MCNC: NEGATIVE — SIGNIFICANT CHANGE UP
OXYCODONE UR-MCNC: NEGATIVE — SIGNIFICANT CHANGE UP
PCP SPEC-MCNC: SIGNIFICANT CHANGE UP
PCP UR-MCNC: NEGATIVE — SIGNIFICANT CHANGE UP
PHOSPHATE SERPL-MCNC: 3 MG/DL — SIGNIFICANT CHANGE UP (ref 2.5–4.5)
PLATELET # BLD AUTO: 335 K/UL — SIGNIFICANT CHANGE UP (ref 150–400)
POTASSIUM SERPL-MCNC: 3.7 MMOL/L — SIGNIFICANT CHANGE UP (ref 3.5–5.3)
POTASSIUM SERPL-SCNC: 3.7 MMOL/L — SIGNIFICANT CHANGE UP (ref 3.5–5.3)
RBC # BLD: 5.32 M/UL — HIGH (ref 3.8–5.2)
RBC # FLD: 13.3 % — SIGNIFICANT CHANGE UP (ref 10.3–14.5)
SODIUM SERPL-SCNC: 137 MMOL/L — SIGNIFICANT CHANGE UP (ref 135–145)
T4 AB SER-ACNC: 8.9 UG/DL — SIGNIFICANT CHANGE UP (ref 4.6–12)
THC UR QL: NEGATIVE — SIGNIFICANT CHANGE UP
TRIGL SERPL-MCNC: 113 MG/DL — SIGNIFICANT CHANGE UP
TSH SERPL-MCNC: 0.77 UIU/ML — SIGNIFICANT CHANGE UP (ref 0.27–4.2)
WBC # BLD: 11.06 K/UL — HIGH (ref 3.8–10.5)
WBC # FLD AUTO: 11.06 K/UL — HIGH (ref 3.8–10.5)

## 2021-12-12 PROCEDURE — 71045 X-RAY EXAM CHEST 1 VIEW: CPT | Mod: 26

## 2021-12-12 PROCEDURE — 70450 CT HEAD/BRAIN W/O DYE: CPT | Mod: 26

## 2021-12-12 PROCEDURE — 99231 SBSQ HOSP IP/OBS SF/LOW 25: CPT | Mod: GC

## 2021-12-12 PROCEDURE — 99291 CRITICAL CARE FIRST HOUR: CPT

## 2021-12-12 PROCEDURE — 93970 EXTREMITY STUDY: CPT | Mod: 26

## 2021-12-12 PROCEDURE — 99292 CRITICAL CARE ADDL 30 MIN: CPT

## 2021-12-12 RX ORDER — INFLUENZA VIRUS VACCINE 15; 15; 15; 15 UG/.5ML; UG/.5ML; UG/.5ML; UG/.5ML
0.5 SUSPENSION INTRAMUSCULAR ONCE
Refills: 0 | Status: DISCONTINUED | OUTPATIENT
Start: 2021-12-12 | End: 2021-12-17

## 2021-12-12 RX ORDER — CHLORHEXIDINE GLUCONATE 213 G/1000ML
1 SOLUTION TOPICAL
Refills: 0 | Status: DISCONTINUED | OUTPATIENT
Start: 2021-12-12 | End: 2021-12-15

## 2021-12-12 RX ORDER — POTASSIUM CHLORIDE 20 MEQ
40 PACKET (EA) ORAL ONCE
Refills: 0 | Status: DISCONTINUED | OUTPATIENT
Start: 2021-12-12 | End: 2021-12-13

## 2021-12-12 RX ORDER — POLYETHYLENE GLYCOL 3350 17 G/17G
17 POWDER, FOR SOLUTION ORAL EVERY 12 HOURS
Refills: 0 | Status: DISCONTINUED | OUTPATIENT
Start: 2021-12-12 | End: 2021-12-17

## 2021-12-12 RX ORDER — INSULIN LISPRO 100/ML
VIAL (ML) SUBCUTANEOUS EVERY 6 HOURS
Refills: 0 | Status: DISCONTINUED | OUTPATIENT
Start: 2021-12-12 | End: 2021-12-14

## 2021-12-12 RX ORDER — INSULIN LISPRO 100/ML
VIAL (ML) SUBCUTANEOUS
Refills: 0 | Status: DISCONTINUED | OUTPATIENT
Start: 2021-12-12 | End: 2021-12-12

## 2021-12-12 RX ORDER — HYDRALAZINE HCL 50 MG
10 TABLET ORAL ONCE
Refills: 0 | Status: COMPLETED | OUTPATIENT
Start: 2021-12-12 | End: 2021-12-12

## 2021-12-12 RX ORDER — ATORVASTATIN CALCIUM 80 MG/1
20 TABLET, FILM COATED ORAL AT BEDTIME
Refills: 0 | Status: DISCONTINUED | OUTPATIENT
Start: 2021-12-12 | End: 2021-12-12

## 2021-12-12 RX ORDER — ENOXAPARIN SODIUM 100 MG/ML
40 INJECTION SUBCUTANEOUS
Refills: 0 | Status: DISCONTINUED | OUTPATIENT
Start: 2021-12-13 | End: 2021-12-17

## 2021-12-12 RX ORDER — POTASSIUM CHLORIDE 20 MEQ
10 PACKET (EA) ORAL
Refills: 0 | Status: COMPLETED | OUTPATIENT
Start: 2021-12-12 | End: 2021-12-12

## 2021-12-12 RX ADMIN — SODIUM CHLORIDE 75 MILLILITER(S): 9 INJECTION INTRAMUSCULAR; INTRAVENOUS; SUBCUTANEOUS at 18:17

## 2021-12-12 RX ADMIN — PANTOPRAZOLE SODIUM 40 MILLIGRAM(S): 20 TABLET, DELAYED RELEASE ORAL at 11:48

## 2021-12-12 RX ADMIN — Medication 100 MILLIEQUIVALENT(S): at 02:23

## 2021-12-12 RX ADMIN — Medication 10 MILLIGRAM(S): at 03:15

## 2021-12-12 RX ADMIN — LEVETIRACETAM 400 MILLIGRAM(S): 250 TABLET, FILM COATED ORAL at 18:17

## 2021-12-12 RX ADMIN — Medication 100 MILLIEQUIVALENT(S): at 01:20

## 2021-12-12 RX ADMIN — SODIUM CHLORIDE 75 MILLILITER(S): 9 INJECTION INTRAMUSCULAR; INTRAVENOUS; SUBCUTANEOUS at 02:28

## 2021-12-12 RX ADMIN — LEVETIRACETAM 400 MILLIGRAM(S): 250 TABLET, FILM COATED ORAL at 05:07

## 2021-12-12 RX ADMIN — POLYETHYLENE GLYCOL 3350 17 GRAM(S): 17 POWDER, FOR SOLUTION ORAL at 18:17

## 2021-12-12 RX ADMIN — Medication 100 MILLIEQUIVALENT(S): at 03:24

## 2021-12-12 NOTE — PROGRESS NOTE ADULT - SUBJECTIVE AND OBJECTIVE BOX
Patient seen and examined at bedside.    --Anticoagulation--    T(C): 36.9 (12-12-21 @ 03:00), Max: 37.2 (12-11-21 @ 16:15)  HR: 94 (12-12-21 @ 04:00) (65 - 98)  BP: 153/90 (12-12-21 @ 04:00) (115/83 - 168/98)  RR: 18 (12-12-21 @ 04:00) (12 - 20)  SpO2: 99% (12-12-21 @ 04:00) (94% - 100%)  Wt(kg): --    Exam: AOx2 (self, place w/ choices), expressive aphasia, FC, L gaze preference unable to overcome, R facial, RUE WD, RLE 2/5, Lt side 5/5

## 2021-12-12 NOTE — OCCUPATIONAL THERAPY INITIAL EVALUATION ADULT - PERTINENT HX OF CURRENT PROBLEM, REHAB EVAL
55F w/ no sig pmh, not on AC/AP found down by daughter w/ R weakness and slurred speech after hearing a sound found to have a L 4.5 x 2.4 x 3.6 cm BG IPH with no shift, CTA neg. 10hr repeat on Left done at [facility] stable.expressive aphasia, FC, L gaze preference unable to DSA neg for vascular malformation likely 2/2 uncontrolled HTN.

## 2021-12-12 NOTE — PROGRESS NOTE ADULT - ASSESSMENT
ASSESSMENT/PLAN:    55F p/w L BG heme ICH score 1, CTA/angio negative for vascular lesion, likely 2/2 uncontrolled HTN    NEURO:  s/p angio negative 12/11  - neuro checks q1h  - hold AC/AP  - pain control  - SZ ppx: Keppra 500mg BID  - stroke core measures  - Activity: PT/OT; brace    CVS:  - SBP goal 100-160  - cardene prn to goal  - TTE    PULM:  - RA  - IS as tolerated  - aspiration precautions  - CXR    RENAL:  - Fluids: 70cc/h NS until tolerating full diet  - daily IOs    GI:  - Diet: dysphagia screen  - Bowel regimen    ENDO:   - FS goal 120-180  - A1c    HEME/ONC:  - SCDs  - Chemoppx: hold d/t fresh bleed  - monitor h/h    ID:  - monitor for fevers  - trend leuks      Patient is at high risk of neurologic deterioration/death due to:  heme expansion, status, brain compression/herniation      Time spent: 35 critical care minutes ASSESSMENT/PLAN:    55F p/w L BG heme ICH score 1, DSA neg for vascular malformation  likely 2/2 uncontrolled HTN    NEURO:   CT head yesterday night was stable   - neuro checks q2h  - consult stroke team  - pain control  - SZ ppx: Keppra 500mg BID for total of 7 days  - brain edema keep sodium 140-150   - stroke core measures  - Activity: PT/OT; brace    CVS:  - SBP goal 100-160  - TTE pending    PULM:  - RA  - aspiration precautions    RENAL:  - Fluids: 70cc/h NS until tolerating full diet  - daily IOs    GI:  - Diet: dysphagia screen  - Bowel regimen    ENDO:   - FS goal 120-180  - A1c    HEME/ONC:  - SCDs  - Chemoppx: hold till tomorrow   - monitor h/h    ID:  - afebrile       Patient is at high risk of neurologic deterioration/death due to:  heme expansion, status, brain compression/herniation      Time spent: 35 critical care minutes ASSESSMENT/PLAN:    55F p/w L BG heme ICH score 1, DSA neg for vascular malformation  likely 2/2 uncontrolled HTN    NEURO:   CT head today am stable   - neuro checks q2h  - consult stroke team  - pain control   - SZ ppx: Keppra 500mg BID for total of 7 days day 2   - brain edema keep sodium 140-150   - stroke core measures  -urine toxicology   - Activity: PT/OT; OBC     CVS:  - SBP goal 100-160 mmhg   - TTE pending     PULM:  - RA  - aspiration precautions    RENAL:  - Fluids: 70cc/h NS until tolerating full diet, IVL when feeds at goal   - daily IOs    GI:  - Diet: failed swallow  NG start TF  formal swallow eval   - senna and miralax     ENDO:   - FS goal 120-180  -DM, ISS     HEME/ONC:  - SCDs  - Chemoppx: start lovenox 40 mg sc qhs starting tomorrow     ID:  - afebrile on no antibiotics       Patient is at high risk of neurologic deterioration/death due to:  heme expansion, status, brain compression/herniation      Time spent: 35 critical care minutes

## 2021-12-12 NOTE — SWALLOW BEDSIDE ASSESSMENT ADULT - ASPIRATION PRECAUTIONS
Monitor for s/s aspiration/laryngeal penetration. If noted:  D/C p.o. intake, provide non-oral nutrition/hydration/meds, and contact this service @ [id number]/[email or web address]

## 2021-12-12 NOTE — SWALLOW BEDSIDE ASSESSMENT ADULT - SLP PERTINENT HISTORY OF CURRENT PROBLEM
Patient is a 54yo RH F w/ no reported PMHx, not on AC/AP, who was found down by daughter with R weakness and slurred speech. LKN 23:00. Patient was found down by her family on her living room floor after they heard a thud. Patient was on her stomach, and subsequently noted to not be moving her side and to have slurred speech. Patient had reported c/o fatigue just prior to the episode. To family's knowledge, she is not on any medications at home, including AC/AP therapy. She has not followed up with her PCP in nearly two years due to COVID. 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 Pt found to have a L 4.5 x 2.4 x 3.6 cm BG IPH with no shift, CTA/angio negative for vascular lesion, likely in the s/o uncontrolled HTN. Elevated leuks, likely reactive, no focal infectious source, on RA. Failed dysphagia screen. Stroke team consulted

## 2021-12-12 NOTE — SWALLOW BEDSIDE ASSESSMENT ADULT - ASR SWALLOW RECOMMEND DIAG
An MBS is recommended to instrumentally assess swallow function. Will schedule once patient no longer requires ICU level of care. Per discussion with TONYA Jasso pt pending evaluation/ acceptance by stroke unit./VFSS/MBS

## 2021-12-12 NOTE — PROGRESS NOTE ADULT - ASSESSMENT
Left basal ganglia hemorrhage, angio negative  - MRI to r/o underlying lesion  - Na goal 140-150 for symptomatic cerebral edema/brain compression  - aspiration precautions  - Dysphagia: on TF, repeat S&S eval pending  - -160mmHg  - As CT stable, on VTE chemoppx    Patient at risk of death/neuro decline due to cerebral edema and brain compression

## 2021-12-12 NOTE — PHYSICAL THERAPY INITIAL EVALUATION ADULT - BALANCE DISTURBANCE, IDENTIFIED IMPAIRMENT CONTRIBUTE, REHAB EVAL
abnormal muscle tone/impaired postural control/decreased strength impaired coordination/abnormal muscle tone/impaired postural control/decreased strength

## 2021-12-12 NOTE — CONSULT NOTE ADULT - ASSESSMENT
VS: 98.1F, HR79, /81, RR14, 98%RA  Labs: a1c 6.3,   CT head: unchanged 4.1 cm hemorrhage in L basal ganglia w/ mild effacement of L lateral ventricle but w/o midline shift.     Angiogram 12/11: pending final report however, chart note 12/11 w/ normal cerebral angiogram.    Recommendations:  - To be discussed regarding patient stability for downgrade with stroke attending  - BP control goal <160/90  - Repeat CTH have been stable   - q2 neuro checks, vitals  - hold ASA, lovenox, use mechanical DVT ppx for now  - Tele monitoring  - CXR  - MRI brain w/ and w/o cont to look for underlying lesions, malformations.   - MRA brain w/o contast MRA neck w/con  - HgA1c, Lipid profile, CBC, PT/PTT, Fibrinogen, CMP, Type and Screen, Urine Toxicology Screen   - NPO w/ TF  - PT/OT  - TTE  - keppra 500BID     To be discussed with neurology attending and will be formally staffed by attending during morning rounds. Recommendations will be finalized once signed by attending. Patient is a 54yo RH F w/ no reported PMHx, not on AC/AP, who was found down by daughter with R weakness and slurred speech found to have L BG IPH, now stable on repeat CTH and with unrevealing CTA and angiogram. Neurology consulted for consideration of downgrade to stroke service from [facility]. Source of IPH is presumed to be from hypertension.     VS: 98.1F, HR79, /81, RR14, 98%RA  Labs: a1c 6.3,   CT head: unchanged 4.1 cm hemorrhage in L basal ganglia w/ mild effacement of L lateral ventricle but w/o midline shift.   CTA from [facility] was unrevealing  Angiogram 12/11: pending final report however, chart note 12/11 w/ normal cerebral angiogram.    Recommendations:  - To be discussed regarding patient stability for downgrade with stroke attending  - BP control goal <160/90  - Repeat CTH have been stable   - q2 neuro checks, vitals  - hold ASA, lovenox, use mechanical DVT ppx for now  - Tele monitoring  - CXR  - MRI brain w/ and w/o cont to look for underlying lesions, malformations.   - MRA brain w/o contast MRA neck w/con  - HgA1c, Lipid profile, CBC, PT/PTT, Fibrinogen, CMP, Type and Screen, Urine Toxicology Screen   - NPO w/ TF  - PT/OT  - TTE  - keppra 500BID     To be discussed with neurology attending and will be formally staffed by attending during morning rounds. Recommendations will be finalized once signed by attending. Patient is a 54yo RH F w/ no reported PMHx, not on AC/AP, who was found down by daughter with R weakness and slurred speech found to have L BG IPH, now stable on repeat CTH and with unrevealing CTA and angiogram. Neurology consulted for consideration of downgrade to stroke service from [facility]. Source of IPH is presumed to be from hypertension.     VS: 98.1F, HR79, /81, RR14, 98%RA. Exam: lethargic, roving eye movements but able to cross midline. R hemiplegia and hemisensory loss.  Labs: a1c 6.3,   CT head: unchanged 4.1 cm hemorrhage in L basal ganglia w/ mild effacement of L lateral ventricle but w/o midline shift.   CTA from [facility] was unrevealing  Angiogram 12/11: pending final report however, chart note 12/11 w/ normal cerebral angiogram.    Recommendations:  - To be discussed regarding patient stability for downgrade with stroke attending  - BP control goal <160/90  - Repeat CTH have been stable   - q2 neuro checks, vitals  - hold ASA, lovenox, use mechanical DVT ppx for now  - Tele monitoring  - CXR  - MRI brain w/ and w/o cont to look for underlying lesions, malformations.   - MRA brain w/o contast MRA neck w/con  - HgA1c, Lipid profile, CBC, PT/PTT, Fibrinogen, CMP, Type and Screen, Urine Toxicology Screen   - NPO w/ TF  - PT/OT  - TTE  - keppra 500BID     To be discussed with neurology attending and will be formally staffed by attending during morning rounds. Recommendations will be finalized once signed by attending. Patient is a 54yo RH F w/ no reported PMHx, not on AC/AP, who was found down by daughter with R weakness and slurred speech found to have L BG IPH, now stable on repeat CTH and with unrevealing CTA and angiogram. Neurology consulted for consideration of downgrade to stroke service from [facility]. Source of IPH is presumed to be from hypertension.     VS: 98.1F, HR79, /81, RR14, 98%RA. Exam: lethargic, roving eye movements but able to cross midline. R hemiplegia and hemisensory loss.  Labs: a1c 6.3,   CT head: unchanged 4.1 cm hemorrhage in L basal ganglia w/ mild effacement of L lateral ventricle but w/o midline shift.   CTA from [facility] was unrevealing  Angiogram 12/11: pending final report however, chart note 12/11 w/ normal cerebral angiogram.    Recommendations:  - To be discussed regarding patient stability for downgrade with stroke attending  - BP control goal <160/90  - Repeat CTH have been stable   - q2 neuro checks, vitals  - hold ASA, lovenox, use mechanical DVT ppx for now  - Tele monitoring  - CXR  - MRI brain w/ and w/o cont to look for underlying lesions, malformations.   - HgA1c, Lipid profile, CBC, PT/PTT, Fibrinogen, CMP, Type and Screen, Urine Toxicology Screen   - NPO w/ TF  - PT/OT  - TTE  - keppra 500BID     To be discussed with neurology attending and will be formally staffed by attending during morning rounds. Recommendations will be finalized once signed by attending.

## 2021-12-12 NOTE — SWALLOW BEDSIDE ASSESSMENT ADULT - ADDITIONAL RECOMMENDATIONS
Maintain good oral hygiene.  Formal speech-language evaluation. Maintain good oral hygiene.  Formal speech-language evaluation.    GOAL: Pt/caregivers will demonstrate understanding of dysphagia management/ plan of care.

## 2021-12-12 NOTE — PROGRESS NOTE ADULT - SUBJECTIVE AND OBJECTIVE BOX
[facility] Progress Note    Assessment/Hospital Course:    55F w/ no sig pmh, not on AC/AP found down by daughter w/ R weakness and slurred speech after hearing a sound found to have a L 4.5 x 2.4 x 3.6 cm BG IPH with no shift, CTA/angio negative for vascular lesion, likely in the s/o uncontrolled HTN.    ICH score 1    24 Hour Events/Subjective:  - L BG heme ICH score 1, angio negative  - CTH stable        REVIEW OF SYSTEMS:  - negative except as above    VITALS:   - Reviewed      IMAGING/DATA:   - Reviewed          PHYSICAL EXAM:    General: cooperative  CVS: RR  Pulm: CTAB  GI: Soft, NTND  Extremities: No LE Edema, no hematoma, pulses in tact  Neuro: AOx2 (self, place w/ choices), expressive aphasia, FC, L gaze preference unable to overcome, R facial, RUE WD, RLE 2/5, Lt side 5/5 [facility] Progress Note    Assessment/Hospital Course:    55F w/ no sig pmh, not on AC/AP found down by daughter w/ R weakness and slurred speech after hearing a sound found to have a L 4.5 x 2.4 x 3.6 cm BG IPH with no shift, CTA/angio negative for vascular lesion, likely in the s/o uncontrolled HTN.    ICH score 1    24 Hour Events/Subjective:  - L BG heme ICH score 1, angio negative  - CTH stable                REVIEW OF SYSTEMS: [x ] Unable to Assess due to neurologic exam   [ ] All ROS addressed below are non-contributory, except:  Neuro: [ ] Headache [ ] Back pain [ ] Numbness [ ] Weakness [ ] Ataxia [ ] Dizziness [ ] Aphasia [ ] Dysarthria [ ] Visual disturbance  Resp: [ ] Shortness of breath/dyspnea, [ ] Orthopnea [ ] Cough  CV: [ ] Chest pain [ ] Palpitation [ ] Lightheadedness [ ] Syncope  Renal: [ ] Thirst [ ] Edema  GI: [ ] Nausea [ ] Emesis [ ] Abdominal pain [ ] Constipation [ ] Diarrhea  Hem: [ ] Hematemesis [ ] bright red blood per rectum  ID: [ ] Fever [ ] Chills [ ] Dysuria  ENT: [ ] Rhinorrhea          T(C): 36.9 (12-12-21 @ 03:00), Max: 37.2 (12-11-21 @ 16:15)  HR: 94 (12-12-21 @ 04:00) (65 - 98)  BP: 153/90 (12-12-21 @ 04:00) (115/83 - 168/98)  RR: 18 (12-12-21 @ 04:00) (12 - 20)  SpO2: 99% (12-12-21 @ 04:00) (94% - 100%)  12-11-21 @ 07:01  -  12-12-21 @ 05:57  --------------------------------------------------------  IN: 1040 mL / OUT: 450 mL / NET: 590 mL    acetaminophen     Tablet .. 650 milliGRAM(s) Oral every 6 hours PRN  chlorhexidine 4% Liquid 1 Application(s) Topical <User Schedule>  levETIRAcetam  IVPB 500 milliGRAM(s) IV Intermittent every 12 hours  ondansetron Injectable 4 milliGRAM(s) IV Push every 6 hours PRN  pantoprazole  Injectable 40 milliGRAM(s) IV Push daily  senna 2 Tablet(s) Oral at bedtime PRN  sodium chloride 0.9%. 1000 milliLiter(s) IV Continuous <Continuous>        IMAGING/DATA:   - Reviewed          PHYSICAL EXAM:    General: cooperative  CVS: RR  Pulm: CTAB  GI: Soft, NTND  Extremities: No LE Edema, no hematoma, pulses in tact  Neuro: AOx2 (self, place w/ choices), expressive aphasia, FC, L gaze preference unable to overcome, R facial, RUE WD, RLE 2/5, Lt side 5/5          LABS:  Na: 140 (12-11 @ 21:52), 137 (12-11 @ 10:05)  K: 3.7 (12-11 @ 21:52), 3.9 (12-11 @ 10:05)  Cl: 107 (12-11 @ 21:52), 104 (12-11 @ 10:05)  CO2: 20 (12-11 @ 21:52), 20 (12-11 @ 10:05)  BUN: 9 (12-11 @ 21:52), 14 (12-11 @ 10:05)  Cr: 0.38 (12-11 @ 21:52), 0.40 (12-11 @ 10:05)  Glu: 127(12-11 @ 21:52), 129(12-11 @ 10:05)    Hgb: 14.2 (12-11 @ 21:52), 15.3 (12-11 @ 10:05)  Hct: 42.7 (12-11 @ 21:52), 46.8 (12-11 @ 10:05)  WBC: 9.26 (12-11 @ 21:52), 12.36 (12-11 @ 10:05)  Plt: 297 (12-11 @ 21:52), 323 (12-11 @ 10:05)    INR: 1.04 12-11-21 @ 10:05  PTT: 35.0 12-11-21 @ 10:05         [facility] Progress Note    Assessment/Hospital Course:    55F w/ no sig pmh, not on AC/AP found down by daughter w/ R weakness and slurred speech after hearing a sound found to have a L 4.5 x 2.4 x 3.6 cm BG IPH with no shift, CTA/angio negative for vascular lesion, likely in the s/o uncontrolled HTN.    ICH score 1    24 Hour Events/Subjective:  - exam remains stable, repeat CT head at night stable                 REVIEW OF SYSTEMS: [x ] Unable to Assess due to neurologic exam   [ ] All ROS addressed below are non-contributory, except:  Neuro: [ ] Headache [ ] Back pain [ ] Numbness [ ] Weakness [ ] Ataxia [ ] Dizziness [ ] Aphasia [ ] Dysarthria [ ] Visual disturbance  Resp: [ ] Shortness of breath/dyspnea, [ ] Orthopnea [ ] Cough  CV: [ ] Chest pain [ ] Palpitation [ ] Lightheadedness [ ] Syncope  Renal: [ ] Thirst [ ] Edema  GI: [ ] Nausea [ ] Emesis [ ] Abdominal pain [ ] Constipation [ ] Diarrhea  Hem: [ ] Hematemesis [ ] bright red blood per rectum  ID: [ ] Fever [ ] Chills [ ] Dysuria  ENT: [ ] Rhinorrhea          T(C): 36.9 (12-12-21 @ 03:00), Max: 37.2 (12-11-21 @ 16:15)  HR: 94 (12-12-21 @ 04:00) (65 - 98)  BP: 153/90 (12-12-21 @ 04:00) (115/83 - 168/98)  RR: 18 (12-12-21 @ 04:00) (12 - 20)  SpO2: 99% (12-12-21 @ 04:00) (94% - 100%)  12-11-21 @ 07:01  -  12-12-21 @ 05:57  --------------------------------------------------------  IN: 1040 mL / OUT: 450 mL / NET: 590 mL    acetaminophen     Tablet .. 650 milliGRAM(s) Oral every 6 hours PRN  chlorhexidine 4% Liquid 1 Application(s) Topical <User Schedule>  levETIRAcetam  IVPB 500 milliGRAM(s) IV Intermittent every 12 hours  ondansetron Injectable 4 milliGRAM(s) IV Push every 6 hours PRN  pantoprazole  Injectable 40 milliGRAM(s) IV Push daily  senna 2 Tablet(s) Oral at bedtime PRN  sodium chloride 0.9%. 1000 milliLiter(s) IV Continuous <Continuous>        IMAGING/DATA:   - Reviewed          PHYSICAL EXAM:    General: cooperative  CVS: RR  Pulm: CTAB  GI: Soft, NTND  Extremities: No LE Edema, no hematoma, pulses in tact  Neuro: AOx2 (self, place w/ choices),severe dysarthria, FC, L gaze preference can overcome it, R facial, RUE WD, RLE 2/5, Lt side 5/5          LABS:  Na: 140 (12-11 @ 21:52), 137 (12-11 @ 10:05)  K: 3.7 (12-11 @ 21:52), 3.9 (12-11 @ 10:05)  Cl: 107 (12-11 @ 21:52), 104 (12-11 @ 10:05)  CO2: 20 (12-11 @ 21:52), 20 (12-11 @ 10:05)  BUN: 9 (12-11 @ 21:52), 14 (12-11 @ 10:05)  Cr: 0.38 (12-11 @ 21:52), 0.40 (12-11 @ 10:05)  Glu: 127(12-11 @ 21:52), 129(12-11 @ 10:05)    Hgb: 14.2 (12-11 @ 21:52), 15.3 (12-11 @ 10:05)  Hct: 42.7 (12-11 @ 21:52), 46.8 (12-11 @ 10:05)  WBC: 9.26 (12-11 @ 21:52), 12.36 (12-11 @ 10:05)  Plt: 297 (12-11 @ 21:52), 323 (12-11 @ 10:05)    INR: 1.04 12-11-21 @ 10:05  PTT: 35.0 12-11-21 @ 10:05

## 2021-12-12 NOTE — OCCUPATIONAL THERAPY INITIAL EVALUATION ADULT - NS ASR FOLLOW COMMAND OT EVAL
word finding issues noted/100% of the time/able to follow single-step instructions/aphasia/oral apraxia

## 2021-12-12 NOTE — SWALLOW BEDSIDE ASSESSMENT ADULT - COMMENTS
Neuro: AOx2 (self, place w/ choices), expressive aphasia, FC, L gaze preference unable to overcome, R facial, RUE WD, RLE 2/5, Lt side 5/5

## 2021-12-12 NOTE — PHYSICAL THERAPY INITIAL EVALUATION ADULT - TRANSFER SAFETY CONCERNS NOTED: SIT/STAND, REHAB EVAL
decreased balance during turns/decreased sequencing ability/stand pivot/inability to maintain weight-bearing restrictions w/o assist/decreased weight-shifting ability

## 2021-12-12 NOTE — SWALLOW BEDSIDE ASSESSMENT ADULT - SWALLOW EVAL: DIAGNOSIS
Attempted to see patient for bedside swallow evaluation. OT currently working with patient. Will follow-up at a later time/date. Pt presents with: 1) An oropharyngeal dysphagia. There is intermittently delayed oral transit, suspected reduced hyolaryngeal excursion upon palpation, and immediate cough post-swallow with thin liquid/ ice chips, concerning for laryngeal penetration/aspiration. 2) Moderate-severe dysarthria 3) Cognitive-linguistic deficits Patient is a 56yo RH F w/ no reported PMHx, not on AC/AP, who was found down by daughter with R weakness and slurred speech found to have L BG IPH. Pt presents with: 1) An oropharyngeal dysphagia. There is intermittently delayed oral transit, suspected reduced hyolaryngeal excursion upon palpation, and immediate cough post-swallow with thin liquid/ ice chips, concerning for laryngeal penetration/aspiration. 2) Moderate-severe dysarthria 3) Cognitive-linguistic deficits

## 2021-12-12 NOTE — PROGRESS NOTE ADULT - SUBJECTIVE AND OBJECTIVE BOX
56 year-old woman admitted 12/11/21 with left basal ganglia hemorrhage with angio 12/11 negative for vascular malformation.     CT Head today unchanged.     Sleepy, but arouses to voice, oriented x 2 with choices, left gaze preference but can overcome, expressive aphasia, right facial droop, RUE w/d, RLE 2/5, left side 5/5

## 2021-12-12 NOTE — OCCUPATIONAL THERAPY INITIAL EVALUATION ADULT - BED MOBILITY TRAINING, PT EVAL
Pt will perform bed mobility with independence in prep for Activities of Daily Living within 4 weeks.

## 2021-12-12 NOTE — OCCUPATIONAL THERAPY INITIAL EVALUATION ADULT - ADL RETRAINING, OT EVAL
Pt will perform basic grooming independently within 4 weeks. Pt will perform UB dressing independently within 4 weeks.

## 2021-12-12 NOTE — PHYSICAL THERAPY INITIAL EVALUATION ADULT - MANUAL MUSCLE TESTING RESULTS, REHAB EVAL
R UE/LE 0/5/grossly assessed due to LUE/LE 5/5, R UE 0/5, R Shldr 1/5, R LE 1+/5/grossly assessed due to

## 2021-12-12 NOTE — CONSULT NOTE ADULT - ATTENDING COMMENTS
Ms. Dillard is a 55-year-old woman with no significant past medical history per family, not diagnosed to be hypertensive.  However, she has not followed up with her primary care in the last 2 years due to Covid.  She was found down by her daughter, presented with right sided weakness and dysarthria.  On noncontrast head CT found to have left intracerebral basal ganglia hemorrhage.  Cerebral angiogram performed by neurosurgical service did not reveal any vascular malformation or lesions.  Today she is easily arousable, has severe dysarthria,, right hemiplegia  Impression: Left basal ganglia intracerebral hemorrhage based on location appears to be related to uncontrolled hypertension  However, recommend MRI brain with and without contrast  Catheter cerebral angiogram failed to reveal vascular formations or lesions.  blood pressure control, goal blood pressure <140/90  PT OT PMR assessment

## 2021-12-12 NOTE — SWALLOW BEDSIDE ASSESSMENT ADULT - PHARYNGEAL PHASE
Decreased laryngeal elevation/Cough post oral intake x1-2 swallows per bolus/Decreased laryngeal elevation

## 2021-12-12 NOTE — PHYSICAL THERAPY INITIAL EVALUATION ADULT - ADDITIONAL COMMENTS
Patient lives in 2 family house with daughter,18 steps to enter. one step inside.  Patient ambulated without AD independent.

## 2021-12-12 NOTE — CONSULT NOTE ADULT - SUBJECTIVE AND OBJECTIVE BOX
Neurology Consultation  Gustavo Davalos, PGY-2      HPI: Patient is a 56yo F w/ no reported PMHx, not on AC/AP, who was found down by daughter with R weakness and slurred speech.     Imaging showed left 4.5 x 2.4 x 3.6 cm BG IPH with no shift. 10hr repeat done at [facility] stable.    Exam: awake, eomi, pupils 3R b/l, R facial, FC, expressive aphasia, R hemiparesis 2/5, L side 5/5, no sensation on Right side.    [facility] MRN [id number]    NIHSS:   preMRS:   ICH:     PAST MEDICAL & SURGICAL HISTORY:  No pertinent past medical history    No significant past surgical history    FAMILY HISTORY:    SOCIAL HISTORY: no smoking, alcohol, drug use hx    MEDICATIONS (HOME):  Home Medications:    MEDICATIONS  (STANDING):  chlorhexidine 4% Liquid 1 Application(s) Topical <User Schedule>  insulin lispro (ADMELOG) corrective regimen sliding scale   SubCutaneous every 6 hours  levETIRAcetam  IVPB 500 milliGRAM(s) IV Intermittent every 12 hours  pantoprazole  Injectable 40 milliGRAM(s) IV Push daily  polyethylene glycol 3350 17 Gram(s) Oral every 12 hours  sodium chloride 0.9%. 1000 milliLiter(s) (75 mL/Hr) IV Continuous <Continuous>    MEDICATIONS  (PRN):  acetaminophen     Tablet .. 650 milliGRAM(s) Oral every 6 hours PRN Temp greater or equal to 38C (100.4F), Mild Pain (1 - 3)  ondansetron Injectable 4 milliGRAM(s) IV Push every 6 hours PRN Nausea and/or Vomiting  senna 2 Tablet(s) Oral at bedtime PRN Constipation    ALLERGIES/INTOLERANCES:  Allergies  No Known Allergies    Intolerances    VITALS & EXAMINATION:  Vital Signs Last 24 Hrs  T(C): 36.7 (12 Dec 2021 12:00), Max: 37.2 (11 Dec 2021 16:15)  T(F): 98.1 (12 Dec 2021 12:00), Max: 98.9 (11 Dec 2021 16:15)  HR: 79 (12 Dec 2021 12:00) (65 - 94)  BP: 123/81 (12 Dec 2021 12:00) (112/71 - 168/98)  BP(mean): 95 (12 Dec 2021 12:00) (75 - 118)  RR: 14 (12 Dec 2021 12:00) (12 - 20)  SpO2: 98% (12 Dec 2021 12:00) (94% - 100%)    General:  Constitutional: Female, appears stated age, in no apparent distress including pain  Head: Normocephalic & atraumatic; Eyes: clear sclera; Neck: supple  Extremities: No cyanosis, clubbing, or edema; Skin: No rashes, bruising, or discoloration.  Resp: breathing comfortably, normal rate    Neurological (>12):  MS: Awake, alert, oriented to person, place, situation, time. Normal affect. Follows all commands. Cooperative.   Language: Speech is clear, fluent, intact with normal tone/rate/ volume and good repetition,  comprehension, registration of words. No perseverance.     CNs: PERRL (R = 3mm, L = 3mm). VFF. EOMI no nystagmus. V1-3 intact to LT, well developed masseter muscles b/l. No facial asymmetry b/l, full eye closure strength b/l. Hearing grossly normal (rubbing fingers) b/l.  Gag reflex deferred.     Motor: Normal muscle bulk & tone. No noticeable tremor or seizure. No pronator drift.              Deltoid	Biceps	Triceps	   R	5	5	5	5		 	  L	5	5	5	5			  	H-Flex	K-Ext	D-Flex	P-Flex  R	5	5	5	5			 	   L	5	5	5	5		     Sensation: Intact to LT/PP/Temp/Vibration/Position b/l., Cortical: Extinction on DSS (neglect): none  Reflexes:              Biceps(C5)       BR(C6)     Triceps(C7)               Patellar(L4)        Plantar Resp  R	2	          2	             2		        2		    	Down   L	2	          2	             2		        2		 	Down     Coordination: No dysmetria to FTN  Gait: Normal Romberg. No postural instability. Normal stance and tandem gait.     LABORATORY:  CBC                       14.2   9.26  )-----------( 297      ( 11 Dec 2021 21:52 )             42.7     Chem 12-11    140  |  107  |  9   ----------------------------<  127<H>  3.7   |  20<L>  |  0.38<L>    Ca    9.0      11 Dec 2021 21:52  Phos  3.3     12-11  Mg     2.0     12-11    TPro  7.8  /  Alb  4.1  /  TBili  0.5  /  DBili  x   /  AST  32  /  ALT  36  /  AlkPhos  177<H>  12-11    LFTs LIVER FUNCTIONS - ( 11 Dec 2021 10:05 )  Alb: 4.1 g/dL / Pro: 7.8 g/dL / ALK PHOS: 177 U/L / ALT: 36 U/L / AST: 32 U/L / GGT: x           Coagulopathy PT/INR - ( 11 Dec 2021 10:05 )   PT: 12.4 sec;   INR: 1.04 ratio         PTT - ( 11 Dec 2021 10:05 )  PTT:35.0 sec  Lipid Panel 12-11 Chol 200<H> LDL -- HDL 55 Trig 113  A1c   Cardiac enzymes     U/A   CSF  Other    STUDIES & IMAGING: (EEG, CT, MR, U/S, TTE/LUCIA):    < from: CT Head No Cont (12.12.21 @ 08:03) >    ACC: [id number] EXAM:  CT BRAIN                          PROCEDURE DATE:  12/12/2021      INTERPRETATION:  CT head without IV contrast    CLINICAL INFORMATION:  Follow-up intracranial   Intracranial hemorrhage.    TECHNIQUE: Contiguous axial 5 mmsections were obtained through the head.    This scan was performed using automatic exposure control (radiation dose   reduction software) to obtain a diagnostic image quality scan with   patient dose as low as reasonably achievable.    FINDINGS:   12/11/2021 available for review.    The brain demonstrates unchanged 4.1 cm hemorrhage within the LEFT basal   ganglia with mild effacement of the LEFT lateral ventricle but no midline   shift.   No acute cerebral cortical infarct is seen.  No intracranial   hemorrhage is found.  No mass effect is found in the brain.    The ventricles, sulci and basal cisterns appear unremarkable.    The orbits are unremarkable.  The paranasal sinuses are clear.  The nasal   cavity appears intact.  The nasopharynx is symmetric.  The central skull   base, petrous temporal bones and calvarium remain intact.      IMPRESSION:   unchanged 4.1 cm hemorrhage within the LEFT basal ganglia   with mild effacement of the LEFT lateral ventricle but no midline shift.    ---End of Report ---    NITA MARTI MD; Attending Radiologist  This document has been electronically signed. Dec 12 2021  8:49AM    < end of copied text >   Neurology Consultation  Gustavo Davalos, PGY-2      HPI: Patient is a 56yo RH F w/ no reported PMHx, not on AC/AP, who was found down by daughter with R weakness and slurred speech. LKN 23:00. Patient was found down by her family on her living room floor after they heard a thud. Patient was on her stomach, and subsequently noted to not be moving her side and to have slurred speech. Patient had reported c/o fatigue just prior to the episode. To family's knowledge, she is not on any medications at home, including AC/AP therapy. She has not followed up with her PCP in nearly two years due to COVID. 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     LKN 23:00 on 12/10/21  NIHSS: 14  Baseline mRS: 0  Not a candidate for tPA or MT due to IPH.    Imaging showed left 4.5 x 2.4 x 3.6 cm BG IPH with no shift. 10hr repeat done at [facility] stable.    YVETTE MRN [id number]    Current:   NIHSS:   preMRS:   ICH:     PAST MEDICAL & SURGICAL HISTORY:  No pertinent past medical history    No significant past surgical history    FAMILY HISTORY:    SOCIAL HISTORY: no smoking, alcohol, drug use hx    MEDICATIONS (HOME):  Home Medications:    MEDICATIONS  (STANDING):  chlorhexidine 4% Liquid 1 Application(s) Topical <User Schedule>  insulin lispro (ADMELOG) corrective regimen sliding scale   SubCutaneous every 6 hours  levETIRAcetam  IVPB 500 milliGRAM(s) IV Intermittent every 12 hours  pantoprazole  Injectable 40 milliGRAM(s) IV Push daily  polyethylene glycol 3350 17 Gram(s) Oral every 12 hours  sodium chloride 0.9%. 1000 milliLiter(s) (75 mL/Hr) IV Continuous <Continuous>    MEDICATIONS  (PRN):  acetaminophen     Tablet .. 650 milliGRAM(s) Oral every 6 hours PRN Temp greater or equal to 38C (100.4F), Mild Pain (1 - 3)  ondansetron Injectable 4 milliGRAM(s) IV Push every 6 hours PRN Nausea and/or Vomiting  senna 2 Tablet(s) Oral at bedtime PRN Constipation    ALLERGIES/INTOLERANCES:  Allergies  No Known Allergies    Intolerances    VITALS & EXAMINATION:  Vital Signs Last 24 Hrs  T(C): 36.7 (12 Dec 2021 12:00), Max: 37.2 (11 Dec 2021 16:15)  T(F): 98.1 (12 Dec 2021 12:00), Max: 98.9 (11 Dec 2021 16:15)  HR: 79 (12 Dec 2021 12:00) (65 - 94)  BP: 123/81 (12 Dec 2021 12:00) (112/71 - 168/98)  BP(mean): 95 (12 Dec 2021 12:00) (75 - 118)  RR: 14 (12 Dec 2021 12:00) (12 - 20)  SpO2: 98% (12 Dec 2021 12:00) (94% - 100%)    General:  Constitutional: Female, appears stated age, in no apparent distress including pain  Head: Normocephalic & atraumatic; Eyes: clear sclera; Neck: supple  Extremities: No cyanosis, clubbing, or edema; Skin: No rashes, bruising, or discoloration.  Resp: breathing comfortably, normal rate    Neurological (>12):  MS: Awake, alert, oriented to person, place, situation, time. Normal affect. Follows all commands. Cooperative.   Language: Speech is clear, fluent, intact with normal tone/rate/ volume and good repetition,  comprehension, registration of words. No perseverance.     CNs: PERRL (R = 3mm, L = 3mm). VFF. EOMI no nystagmus. V1-3 intact to LT, well developed masseter muscles b/l. No facial asymmetry b/l, full eye closure strength b/l. Hearing grossly normal (rubbing fingers) b/l.  Gag reflex deferred.     Motor: Normal muscle bulk & tone. No noticeable tremor or seizure. No pronator drift.              Deltoid	Biceps	Triceps	   R	5	5	5	5		 	  L	5	5	5	5			  	H-Flex	K-Ext	D-Flex	P-Flex  R	5	5	5	5			 	   L	5	5	5	5		     Sensation: Intact to LT/PP/Temp/Vibration/Position b/l., Cortical: Extinction on DSS (neglect): none  Reflexes:              Biceps(C5)       BR(C6)     Triceps(C7)               Patellar(L4)        Plantar Resp  R	2	          2	             2		        2		    	Down   L	2	          2	             2		        2		 	Down     Coordination: No dysmetria to FTN  Gait: Normal Romberg. No postural instability. Normal stance and tandem gait.     LABORATORY:  CBC                       14.2   9.26  )-----------( 297      ( 11 Dec 2021 21:52 )             42.7     Chem 12-11    140  |  107  |  9   ----------------------------<  127<H>  3.7   |  20<L>  |  0.38<L>    Ca    9.0      11 Dec 2021 21:52  Phos  3.3     12-11  Mg     2.0     12-11    TPro  7.8  /  Alb  4.1  /  TBili  0.5  /  DBili  x   /  AST  32  /  ALT  36  /  AlkPhos  177<H>  12-11    LFTs LIVER FUNCTIONS - ( 11 Dec 2021 10:05 )  Alb: 4.1 g/dL / Pro: 7.8 g/dL / ALK PHOS: 177 U/L / ALT: 36 U/L / AST: 32 U/L / GGT: x           Coagulopathy PT/INR - ( 11 Dec 2021 10:05 )   PT: 12.4 sec;   INR: 1.04 ratio         PTT - ( 11 Dec 2021 10:05 )  PTT:35.0 sec  Lipid Panel 12-11 Chol 200<H> LDL -- HDL 55 Trig 113  A1c   Cardiac enzymes     U/A   CSF  Other    STUDIES & IMAGING: (EEG, CT, MR, U/S, TTE/LUCIA):    < from: CT Head No Cont (12.12.21 @ 08:03) >    ACC: [id number] EXAM:  CT BRAIN                          PROCEDURE DATE:  12/12/2021      INTERPRETATION:  CT head without IV contrast    CLINICAL INFORMATION:  Follow-up intracranial   Intracranial hemorrhage.    TECHNIQUE: Contiguous axial 5 mmsections were obtained through the head.    This scan was performed using automatic exposure control (radiation dose   reduction software) to obtain a diagnostic image quality scan with   patient dose as low as reasonably achievable.    FINDINGS:   12/11/2021 available for review.    The brain demonstrates unchanged 4.1 cm hemorrhage within the LEFT basal   ganglia with mild effacement of the LEFT lateral ventricle but no midline   shift.   No acute cerebral cortical infarct is seen.  No intracranial   hemorrhage is found.  No mass effect is found in the brain.    The ventricles, sulci and basal cisterns appear unremarkable.    The orbits are unremarkable.  The paranasal sinuses are clear.  The nasal   cavity appears intact.  The nasopharynx is symmetric.  The central skull   base, petrous temporal bones and calvarium remain intact.      IMPRESSION:   unchanged 4.1 cm hemorrhage within the LEFT basal ganglia   with mild effacement of the LEFT lateral ventricle but no midline shift.    ---End of Report ---    NITA MARTI MD; Attending Radiologist  This document has been electronically signed. Dec 12 2021  8:49AM    < end of copied text >   Neurology Consultation  Gustavo Davalos, PGY-2      HPI: Patient is a 56yo RH F w/ no reported PMHx, not on AC/AP, who was found down by daughter with R weakness and slurred speech. LKN 23:00. Patient was found down by her family on her living room floor after they heard a thud. Patient was on her stomach, and subsequently noted to not be moving her side and to have slurred speech. Patient had reported c/o fatigue just prior to the episode. To family's knowledge, she is not on any medications at home, including AC/AP therapy. She has not followed up with her PCP in nearly two years due to COVID. 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     LKN 23:00 on 12/10/21  NIHSS: 14  Baseline mRS: 0  Not a candidate for tPA or MT due to IPH.    Imaging showed left 4.5 x 2.4 x 3.6 cm BG IPH with no shift. 10hr repeat done at [facility] stable.    YVETTE MRN [id number]    Current:   NIHSS:   preMRS:   ICH:     PAST MEDICAL & SURGICAL HISTORY:  No pertinent past medical history    No significant past surgical history    FAMILY HISTORY:    SOCIAL HISTORY: no smoking, alcohol, drug use hx    MEDICATIONS (HOME):  Home Medications:    MEDICATIONS  (STANDING):  chlorhexidine 4% Liquid 1 Application(s) Topical <User Schedule>  insulin lispro (ADMELOG) corrective regimen sliding scale   SubCutaneous every 6 hours  levETIRAcetam  IVPB 500 milliGRAM(s) IV Intermittent every 12 hours  pantoprazole  Injectable 40 milliGRAM(s) IV Push daily  polyethylene glycol 3350 17 Gram(s) Oral every 12 hours  sodium chloride 0.9%. 1000 milliLiter(s) (75 mL/Hr) IV Continuous <Continuous>    MEDICATIONS  (PRN):  acetaminophen     Tablet .. 650 milliGRAM(s) Oral every 6 hours PRN Temp greater or equal to 38C (100.4F), Mild Pain (1 - 3)  ondansetron Injectable 4 milliGRAM(s) IV Push every 6 hours PRN Nausea and/or Vomiting  senna 2 Tablet(s) Oral at bedtime PRN Constipation    ALLERGIES/INTOLERANCES:  Allergies  No Known Allergies    Intolerances    VITALS & EXAMINATION:  Vital Signs Last 24 Hrs  T(C): 36.7 (12 Dec 2021 12:00), Max: 37.2 (11 Dec 2021 16:15)  T(F): 98.1 (12 Dec 2021 12:00), Max: 98.9 (11 Dec 2021 16:15)  HR: 79 (12 Dec 2021 12:00) (65 - 94)  BP: 123/81 (12 Dec 2021 12:00) (112/71 - 168/98)  BP(mean): 95 (12 Dec 2021 12:00) (75 - 118)  RR: 14 (12 Dec 2021 12:00) (12 - 20)  SpO2: 98% (12 Dec 2021 12:00) (94% - 100%)    General:  Constitutional: Female, appears stated age, in no apparent distress including pain  Head: Normocephalic & atraumatic; Eyes: clear sclera; Neck: supple  Extremities: No cyanosis, clubbing, or edema; Skin: No rashes, bruising, or discoloration.  Resp: breathing comfortably, normal rate    Neurological (>12):  MS: Awake, alert, oriented to person, place, situation, time. Normal affect. Follows all commands. Cooperative.   Language: Speech is clear, fluent, intact with normal tone/rate/ volume and good repetition,  comprehension, registration of words. No perseverance.     CNs: PERRL (R = 3mm, L = 3mm). VFF. EOMI no nystagmus. V1-3 intact to LT, well developed masseter muscles b/l. No facial asymmetry b/l, full eye closure strength b/l. Hearing grossly normal (rubbing fingers) b/l.  Gag reflex deferred.     Motor: Normal muscle bulk & tone. No noticeable tremor or seizure. No pronator drift.              Deltoid	Biceps	Triceps	   R	5	5	5	5		 	  L	5	5	5	5			  	H-Flex	K-Ext	D-Flex	P-Flex  R	5	5	5	5			 	   L	5	5	5	5		     Sensation: Intact to LT/PP/Temp/Vibration/Position b/l., Cortical: Extinction on DSS (neglect): none  Reflexes:              Biceps(C5)       BR(C6)     Triceps(C7)               Patellar(L4)        Plantar Resp  R	2	          2	             2		        2		    	Down   L	2	          2	             2		        2		 	Down     Coordination: No dysmetria to FTN  Gait: Normal Romberg. No postural instability. Normal stance and tandem gait.     LABORATORY:  CBC                       14.2   9.26  )-----------( 297      ( 11 Dec 2021 21:52 )             42.7     Chem 12-11    140  |  107  |  9   ----------------------------<  127<H>  3.7   |  20<L>  |  0.38<L>    Ca    9.0      11 Dec 2021 21:52  Phos  3.3     12-11  Mg     2.0     12-11    TPro  7.8  /  Alb  4.1  /  TBili  0.5  /  DBili  x   /  AST  32  /  ALT  36  /  AlkPhos  177<H>  12-11    LFTs LIVER FUNCTIONS - ( 11 Dec 2021 10:05 )  Alb: 4.1 g/dL / Pro: 7.8 g/dL / ALK PHOS: 177 U/L / ALT: 36 U/L / AST: 32 U/L / GGT: x           Coagulopathy PT/INR - ( 11 Dec 2021 10:05 )   PT: 12.4 sec;   INR: 1.04 ratio         PTT - ( 11 Dec 2021 10:05 )  PTT:35.0 sec  Lipid Panel 12-11 Chol 200<H> LDL -- HDL 55 Trig 113  A1c   Cardiac enzymes     U/A   CSF  Other    STUDIES & IMAGING: (EEG, CT, MR, U/S, TTE/LUCIA):    < from: CT Brain Stroke Protocol (12.10.21 @ 23:50) >  FINDINGS:  The study is mildly degraded by motion/streak artifact.    Acute intraparenchymal hemorrhage within the left basal ganglia measuring approximately 4.5 x 2.4 x 3.6 cm (ap x trv x cc). There is surrounding vasogenic edema with mass effect and partial effacement of the left lateral ventricle.    The basal cisterns are patent. There is no hydrocephalus. There is no midline shift.    The visualized paranasal sinuses and mastoid air cells are clear.    The calvarium is intact.    IMPRESSION:  Acute left basal ganglia intraparenchymal hematoma with partial effacement of the left lateral ventricle. No midline shift.    < end of copied text >  < from: CT Angio Head w/ IV Cont (12.11.21 @ 00:06) >    FINDINGS:    CT ANGIOGRAPHY NECK:  There is no evidence for significant stenosis or major vessel occlusion involving the bilateral carotid arteries.    There is no evidence for significant stenosis or major vessel occlusion involving the bilateral vertebral arteries. Left dominant vertebral   system.    The neck and upper chest are unremarkable.    There are degenerative changes in visualized spine.      CT ANGIOGRAPHY BRAIN:  There are mild atherosclerotic calcification of the right cavernous ICA.   There is no evidence for significant stenosis, major vessel occlusion, or aneurysm about the [place].    There is no evidence for significant stenosis, major vessel occlusion, or aneurysm involving the vertebrobasilar system.    No enlarged vascular lesions or clusters of abnormal vessels are noted to suggest an arterial venous malformation within the field-of-view.    Visualized portions of the superficial and deep venous systems are unremarkable.      IMPRESSION:    CT angiography neck:  No evidence of hemodynamically significant stenosis using NASCET criteria. Patent vertebral arteries. No evidence of vascular dissection.    CT angiography brain:  No major vessel occlusion or proximal stenosis. No dissection, saccular aneurysm, or AVM.    --- End of Report ---    < end of copied text >        < from: CT Head No Cont (12.12.21 @ 08:03) >    ACC: [id number] EXAM:  CT BRAIN                          PROCEDURE DATE:  12/12/2021      INTERPRETATION:  CT head without IV contrast    CLINICAL INFORMATION:  Follow-up intracranial   Intracranial hemorrhage.    TECHNIQUE: Contiguous axial 5 mmsections were obtained through the head.    This scan was performed using automatic exposure control (radiation dose   reduction software) to obtain a diagnostic image quality scan with   patient dose as low as reasonably achievable.    FINDINGS:   12/11/2021 available for review.    The brain demonstrates unchanged 4.1 cm hemorrhage within the LEFT basal   ganglia with mild effacement of the LEFT lateral ventricle but no midline   shift.   No acute cerebral cortical infarct is seen.  No intracranial   hemorrhage is found.  No mass effect is found in the brain.    The ventricles, sulci and basal cisterns appear unremarkable.    The orbits are unremarkable.  The paranasal sinuses are clear.  The nasal   cavity appears intact.  The nasopharynx is symmetric.  The central skull   base, petrous temporal bones and calvarium remain intact.      IMPRESSION:   unchanged 4.1 cm hemorrhage within the LEFT basal ganglia   with mild effacement of the LEFT lateral ventricle but no midline shift.    ---End of Report ---    NITA MARTI MD; Attending Radiologist  This document has been electronically signed. Dec 12 2021  8:49AM    < end of copied text >   Neurology Consultation  Gustavo Davalos, PGY-2      HPI: Patient is a 54yo RH F w/ no reported PMHx, not on AC/AP, who was found down by daughter with R weakness and slurred speech. LKN 23:00. Patient was found down by her family on her living room floor after they heard a thud. Patient was on her stomach, and subsequently noted to not be moving her side and to have slurred speech. Patient had reported c/o fatigue just prior to the episode. To family's knowledge, she is not on any medications at home, including AC/AP therapy. She has not followed up with her PCP in nearly two years due to COVID. 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     LKN 23:00 on 12/10/21  NIHSS: 14  Baseline mRS: 0  Not a candidate for tPA or MT due to IPH.    Imaging showed left 4.5 x 2.4 x 3.6 cm BG IPH with no shift. 10hr repeat done at [facility] stable.    YVETTE MRN [id number]    Current:   NIHSS: 17  preMRS: 0  ICH: 0    PAST MEDICAL & SURGICAL HISTORY:  No pertinent past medical history    No significant past surgical history    FAMILY HISTORY:    SOCIAL HISTORY: unable to obtain    MEDICATIONS (HOME):  Home Medications:    MEDICATIONS  (STANDING):  chlorhexidine 4% Liquid 1 Application(s) Topical <User Schedule>  insulin lispro (ADMELOG) corrective regimen sliding scale   SubCutaneous every 6 hours  levETIRAcetam  IVPB 500 milliGRAM(s) IV Intermittent every 12 hours  pantoprazole  Injectable 40 milliGRAM(s) IV Push daily  polyethylene glycol 3350 17 Gram(s) Oral every 12 hours  sodium chloride 0.9%. 1000 milliLiter(s) (75 mL/Hr) IV Continuous <Continuous>    MEDICATIONS  (PRN):  acetaminophen     Tablet .. 650 milliGRAM(s) Oral every 6 hours PRN Temp greater or equal to 38C (100.4F), Mild Pain (1 - 3)  ondansetron Injectable 4 milliGRAM(s) IV Push every 6 hours PRN Nausea and/or Vomiting  senna 2 Tablet(s) Oral at bedtime PRN Constipation    ALLERGIES/INTOLERANCES:  Allergies  No Known Allergies    Intolerances    VITALS & EXAMINATION:  Vital Signs Last 24 Hrs  T(C): 36.7 (12 Dec 2021 12:00), Max: 37.2 (11 Dec 2021 16:15)  T(F): 98.1 (12 Dec 2021 12:00), Max: 98.9 (11 Dec 2021 16:15)  HR: 79 (12 Dec 2021 12:00) (65 - 94)  BP: 123/81 (12 Dec 2021 12:00) (112/71 - 168/98)  BP(mean): 95 (12 Dec 2021 12:00) (75 - 118)  RR: 14 (12 Dec 2021 12:00) (12 - 20)  SpO2: 98% (12 Dec 2021 12:00) (94% - 100%)    General:  Constitutional: Female, appears stated age, lethargic, requiring verbal stimuli to awaken  Head: Normocephalic  Eyes: clear sclera;  Extremities: No cyanosis, clubbing,    Resp: breathing comfortably, normal rate    Neurological (>12):  MS: intermittently awake, difficult to assess orientation. Lethargic. Follows some commands.  Language: Speech is moderately dysarthric. hypophonic. Able to identify 3/3 objects. Understands. Could not assess repetition.     CNs: pupils equal round and roving. Able to cross midline volitionally. difficult to assess VF. EOMI + nystagmus L gaze.  mild facial asymmetry      Motor:               Deltoid	Biceps	Triceps	   R	0	0	0	0		 	  L	4+	4+	4 	4+		  	H-Flex	K-Ext	D-Flex	P-Flex  R	0	0	0	0			 	   L	4+	-	5	5		     Sensation: Intact  absent R side     coordination/ gait: cannot assess    LABORATORY:  CBC                       14.2   9.26  )-----------( 297      ( 11 Dec 2021 21:52 )             42.7     Chem 12-11    140  |  107  |  9   ----------------------------<  127<H>  3.7   |  20<L>  |  0.38<L>    Ca    9.0      11 Dec 2021 21:52  Phos  3.3     12-11  Mg     2.0     12-11    TPro  7.8  /  Alb  4.1  /  TBili  0.5  /  DBili  x   /  AST  32  /  ALT  36  /  AlkPhos  177<H>  12-11    LFTs LIVER FUNCTIONS - ( 11 Dec 2021 10:05 )  Alb: 4.1 g/dL / Pro: 7.8 g/dL / ALK PHOS: 177 U/L / ALT: 36 U/L / AST: 32 U/L / GGT: x           Coagulopathy PT/INR - ( 11 Dec 2021 10:05 )   PT: 12.4 sec;   INR: 1.04 ratio         PTT - ( 11 Dec 2021 10:05 )  PTT:35.0 sec  Lipid Panel 12-11 Chol 200<H> LDL -- HDL 55 Trig 113  A1c   Cardiac enzymes     U/A   CSF  Other    STUDIES & IMAGING: (EEG, CT, MR, U/S, TTE/LUCIA):    < from: CT Brain Stroke Protocol (12.10.21 @ 23:50) >  FINDINGS:  The study is mildly degraded by motion/streak artifact.    Acute intraparenchymal hemorrhage within the left basal ganglia measuring approximately 4.5 x 2.4 x 3.6 cm (ap x trv x cc). There is surrounding vasogenic edema with mass effect and partial effacement of the left lateral ventricle.    The basal cisterns are patent. There is no hydrocephalus. There is no midline shift.    The visualized paranasal sinuses and mastoid air cells are clear.    The calvarium is intact.    IMPRESSION:  Acute left basal ganglia intraparenchymal hematoma with partial effacement of the left lateral ventricle. No midline shift.    < end of copied text >  < from: CT Angio Head w/ IV Cont (12.11.21 @ 00:06) >    FINDINGS:    CT ANGIOGRAPHY NECK:  There is no evidence for significant stenosis or major vessel occlusion involving the bilateral carotid arteries.    There is no evidence for significant stenosis or major vessel occlusion involving the bilateral vertebral arteries. Left dominant vertebral   system.    The neck and upper chest are unremarkable.    There are degenerative changes in visualized spine.      CT ANGIOGRAPHY BRAIN:  There are mild atherosclerotic calcification of the right cavernous ICA.   There is no evidence for significant stenosis, major vessel occlusion, or aneurysm about the [place].    There is no evidence for significant stenosis, major vessel occlusion, or aneurysm involving the vertebrobasilar system.    No enlarged vascular lesions or clusters of abnormal vessels are noted to suggest an arterial venous malformation within the field-of-view.    Visualized portions of the superficial and deep venous systems are unremarkable.      IMPRESSION:    CT angiography neck:  No evidence of hemodynamically significant stenosis using NASCET criteria. Patent vertebral arteries. No evidence of vascular dissection.    CT angiography brain:  No major vessel occlusion or proximal stenosis. No dissection, saccular aneurysm, or AVM.    --- End of Report ---    < end of copied text >        < from: CT Head No Cont (12.12.21 @ 08:03) >    ACC: [id number] EXAM:  CT BRAIN                          PROCEDURE DATE:  12/12/2021      INTERPRETATION:  CT head without IV contrast    CLINICAL INFORMATION:  Follow-up intracranial   Intracranial hemorrhage.    TECHNIQUE: Contiguous axial 5 mmsections were obtained through the head.    This scan was performed using automatic exposure control (radiation dose   reduction software) to obtain a diagnostic image quality scan with   patient dose as low as reasonably achievable.    FINDINGS:   12/11/2021 available for review.    The brain demonstrates unchanged 4.1 cm hemorrhage within the LEFT basal   ganglia with mild effacement of the LEFT lateral ventricle but no midline   shift.   No acute cerebral cortical infarct is seen.  No intracranial   hemorrhage is found.  No mass effect is found in the brain.    The ventricles, sulci and basal cisterns appear unremarkable.    The orbits are unremarkable.  The paranasal sinuses are clear.  The nasal   cavity appears intact.  The nasopharynx is symmetric.  The central skull   base, petrous temporal bones and calvarium remain intact.      IMPRESSION:   unchanged 4.1 cm hemorrhage within the LEFT basal ganglia   with mild effacement of the LEFT lateral ventricle but no midline shift.    ---End of Report ---    NITA MARTI MD; Attending Radiologist  This document has been electronically signed. Dec 12 2021  8:49AM    < end of copied text >

## 2021-12-12 NOTE — PHYSICAL THERAPY INITIAL EVALUATION ADULT - PERTINENT HX OF CURRENT PROBLEM, REHAB EVAL
55F w/ no sig pmh, not on AC/AP found down by daughter w/ R weakness and slurred speech after hearing a sound found to have a L 4.5 x 2.4 x 3.6 cm BG IPH with no shift, CTA neg. 10hr repeat on Left done at [facility] stable. DSA neg for vascular malformation  likely 2/2 uncontrolled HTN 55F w/ no sig pmh, not on AC/AP found down by daughter w/ R weakness and slurred speech after hearing a sound found to have a L 4.5 x 2.4 x 3.6 cm BG IPH with no shift, CTA neg. 10hr repeat on Left done at [facility] stable.expressive aphasia, FC, L gaze preference unable to DSA neg for vascular malformation  likely 2/2 uncontrolled HTN

## 2021-12-13 LAB
ANION GAP SERPL CALC-SCNC: 11 MMOL/L — SIGNIFICANT CHANGE UP (ref 5–17)
BUN SERPL-MCNC: 16 MG/DL — SIGNIFICANT CHANGE UP (ref 7–23)
CALCIUM SERPL-MCNC: 9 MG/DL — SIGNIFICANT CHANGE UP (ref 8.4–10.5)
CHLORIDE SERPL-SCNC: 103 MMOL/L — SIGNIFICANT CHANGE UP (ref 96–108)
CO2 SERPL-SCNC: 22 MMOL/L — SIGNIFICANT CHANGE UP (ref 22–31)
CREAT SERPL-MCNC: 0.39 MG/DL — LOW (ref 0.5–1.3)
GLUCOSE BLDC GLUCOMTR-MCNC: 119 MG/DL — HIGH (ref 70–99)
GLUCOSE BLDC GLUCOMTR-MCNC: 121 MG/DL — HIGH (ref 70–99)
GLUCOSE BLDC GLUCOMTR-MCNC: 125 MG/DL — HIGH (ref 70–99)
GLUCOSE SERPL-MCNC: 137 MG/DL — HIGH (ref 70–99)
HCT VFR BLD CALC: 48.4 % — HIGH (ref 34.5–45)
HGB BLD-MCNC: 15.8 G/DL — HIGH (ref 11.5–15.5)
MAGNESIUM SERPL-MCNC: 2.3 MG/DL — SIGNIFICANT CHANGE UP (ref 1.6–2.6)
MCHC RBC-ENTMCNC: 28.1 PG — SIGNIFICANT CHANGE UP (ref 27–34)
MCHC RBC-ENTMCNC: 32.6 GM/DL — SIGNIFICANT CHANGE UP (ref 32–36)
MCV RBC AUTO: 86.1 FL — SIGNIFICANT CHANGE UP (ref 80–100)
NRBC # BLD: 0 /100 WBCS — SIGNIFICANT CHANGE UP (ref 0–0)
PHOSPHATE SERPL-MCNC: 3.1 MG/DL — SIGNIFICANT CHANGE UP (ref 2.5–4.5)
PLATELET # BLD AUTO: 330 K/UL — SIGNIFICANT CHANGE UP (ref 150–400)
POTASSIUM SERPL-MCNC: 4.4 MMOL/L — SIGNIFICANT CHANGE UP (ref 3.5–5.3)
POTASSIUM SERPL-SCNC: 4.4 MMOL/L — SIGNIFICANT CHANGE UP (ref 3.5–5.3)
RBC # BLD: 5.62 M/UL — HIGH (ref 3.8–5.2)
RBC # FLD: 13.6 % — SIGNIFICANT CHANGE UP (ref 10.3–14.5)
SODIUM SERPL-SCNC: 136 MMOL/L — SIGNIFICANT CHANGE UP (ref 135–145)
WBC # BLD: 11.2 K/UL — HIGH (ref 3.8–10.5)
WBC # FLD AUTO: 11.2 K/UL — HIGH (ref 3.8–10.5)

## 2021-12-13 PROCEDURE — 99233 SBSQ HOSP IP/OBS HIGH 50: CPT

## 2021-12-13 PROCEDURE — 99222 1ST HOSP IP/OBS MODERATE 55: CPT

## 2021-12-13 PROCEDURE — 99255 IP/OBS CONSLTJ NEW/EST HI 80: CPT

## 2021-12-13 PROCEDURE — 93306 TTE W/DOPPLER COMPLETE: CPT | Mod: 26

## 2021-12-13 RX ORDER — POTASSIUM CHLORIDE 20 MEQ
40 PACKET (EA) ORAL ONCE
Refills: 0 | Status: COMPLETED | OUTPATIENT
Start: 2021-12-13 | End: 2021-12-13

## 2021-12-13 RX ADMIN — CHLORHEXIDINE GLUCONATE 1 APPLICATION(S): 213 SOLUTION TOPICAL at 06:01

## 2021-12-13 RX ADMIN — POLYETHYLENE GLYCOL 3350 17 GRAM(S): 17 POWDER, FOR SOLUTION ORAL at 18:06

## 2021-12-13 RX ADMIN — ENOXAPARIN SODIUM 40 MILLIGRAM(S): 100 INJECTION SUBCUTANEOUS at 18:06

## 2021-12-13 RX ADMIN — POLYETHYLENE GLYCOL 3350 17 GRAM(S): 17 POWDER, FOR SOLUTION ORAL at 06:01

## 2021-12-13 RX ADMIN — CHLORHEXIDINE GLUCONATE 1 APPLICATION(S): 213 SOLUTION TOPICAL at 22:30

## 2021-12-13 RX ADMIN — Medication 40 MILLIEQUIVALENT(S): at 00:42

## 2021-12-13 NOTE — SPEECH LANGUAGE PATHOLOGY EVALUATION - SLP ANTICIPATED DISCHARGE DISPOSITION
D/c to acute rehab. Pt will benefit from skilled ST services at next level of care for speech and language therapy.

## 2021-12-13 NOTE — SPEECH LANGUAGE PATHOLOGY EVALUATION - RESPONSIVE NAMING
Pt occasionally stating, "I don't know", benefits from repetition of prompt and gestural cues/impaired

## 2021-12-13 NOTE — PROGRESS NOTE ADULT - SUBJECTIVE AND OBJECTIVE BOX
Assessment/Hospital Course:    55F w/ no sig pmh, not on AC/AP found down by daughter w/ R weakness and slurred speech after hearing a sound found to have a L 4.5 x 2.4 x 3.6 cm BG IPH with no shift, CTA/angio negative for vascular lesion, likely in the s/o uncontrolled HTN.    PAST MEDICAL & SURGICAL HISTORY:  No pertinent past medical history    No significant past surgical history    Allergies    No Known Allergies    Intolerances        ICH score 1    24 Hour Events/Subjective:  no acute events overnight       T(C): 36.8 (12-13-21 @ 03:00), Max: 37.2 (12-12-21 @ 19:00)  HR: 96 (12-13-21 @ 07:00) (77 - 100)  BP: 152/72 (12-13-21 @ 07:00) (122/70 - 155/77)  RR: 16 (12-13-21 @ 07:00) (14 - 22)  SpO2: 91% (12-13-21 @ 07:00) (91% - 100%)  12-12-21 @ 07:01  -  12-13-21 @ 07:00  --------------------------------------------------------  IN: 1775 mL / OUT: 2560 mL / NET: -785 mL    acetaminophen     Tablet .. 650 milliGRAM(s) Oral every 6 hours PRN  chlorhexidine 4% Liquid 1 Application(s) Topical <User Schedule>  enoxaparin Injectable 40 milliGRAM(s) SubCutaneous <User Schedule>  influenza   Vaccine 0.5 milliLiter(s) IntraMuscular once  insulin lispro (ADMELOG) corrective regimen sliding scale   SubCutaneous every 6 hours  polyethylene glycol 3350 17 Gram(s) Oral every 12 hours  senna 2 Tablet(s) Oral at bedtime PRN            REVIEW OF SYSTEMS: [x] Unable to Assess due to neurologic exam   [ ] All ROS addressed below are non-contributory, except:  Neuro: [ ] Headache [ ] Back pain [ ] Numbness [ ] Weakness [ ] Ataxia [ ] Dizziness [ ] Aphasia [ ] Dysarthria [ ] Visual disturbance  Resp: [ ] Shortness of breath/dyspnea, [ ] Orthopnea [ ] Cough  CV: [ ] Chest pain [ ] Palpitation [ ] Lightheadedness [ ] Syncope  Renal: [ ] Thirst [ ] Edema  GI: [ ] Nausea [ ] Emesis [ ] Abdominal pain [ ] Constipation [ ] Diarrhea  Hem: [ ] Hematemesis [ ] bright red blood per rectum  ID: [ ] Fever [ ] Chills [ ] Dysuria  ENT: [ ] Rhinorrhea        IMAGING/DATA:   - Reviewed          PHYSICAL EXAM:    General: cooperative  CVS: RR  Pulm: CTAB  GI: Soft, NTND  Extremities: No LE Edema, no hematoma, pulses in tact  Neuro: AOx2 (self, place w/ choices),severe dysarthria, FC, L gaze preference can overcome it, R facial, RUE WD, RLE 2/5, Lt side 5/5          LABS:  Na: 137 (12-12 @ 23:16), 140 (12-11 @ 21:52), 137 (12-11 @ 10:05)  K: 3.7 (12-12 @ 23:16), 3.7 (12-11 @ 21:52), 3.9 (12-11 @ 10:05)  Cl: 104 (12-12 @ 23:16), 107 (12-11 @ 21:52), 104 (12-11 @ 10:05)  CO2: 21 (12-12 @ 23:16), 20 (12-11 @ 21:52), 20 (12-11 @ 10:05)  BUN: 10 (12-12 @ 23:16), 9 (12-11 @ 21:52), 14 (12-11 @ 10:05)  Cr: 0.38 (12-12 @ 23:16), 0.38 (12-11 @ 21:52), 0.40 (12-11 @ 10:05)  Glu: 134(12-12 @ 23:16), 127(12-11 @ 21:52), 129(12-11 @ 10:05)    Hgb: 14.8 (12-12 @ 23:14), 14.2 (12-11 @ 21:52), 15.3 (12-11 @ 10:05)  Hct: 44.6 (12-12 @ 23:14), 42.7 (12-11 @ 21:52), 46.8 (12-11 @ 10:05)  WBC: 11.06 (12-12 @ 23:14), 9.26 (12-11 @ 21:52), 12.36 (12-11 @ 10:05)  Plt: 335 (12-12 @ 23:14), 297 (12-11 @ 21:52), 323 (12-11 @ 10:05)    INR: 1.04 12-11-21 @ 10:05  PTT: 35.0 12-11-21 @ 10:05        ASSESSMENT/PLAN:    55F p/w L BG heme ICH score 1, DSA neg for vascular malformation  likely 2/2 uncontrolled HTN    NEURO:   - neuro checks q2h  - pain control   d/c keppra no need for seizure prophylaxis   - stroke core measures  - Activity: PT/OT; OBC     CVS:  - SBP goal 100-160 mmhg     PULM:  - RA  - aspiration precautions    RENAL:  - IVL   - daily IOs    GI:  - Diet: failed swallow  on TF   - senna and miralax     ENDO:   - FS goal 120-180    HEME/ONC:  - SCDs  - Chemoppx:  lovenox 40 mg sc qhs     ID:  - afebrile on no antibiotics         not critical      Assessment/Hospital Course:    55F w/ no sig pmh, not on AC/AP found down by daughter w/ R weakness and slurred speech after hearing a sound found to have a L 4.5 x 2.4 x 3.6 cm BG IPH with no shift, CTA/angio negative for vascular lesion, likely in the s/o uncontrolled HTN.    PAST MEDICAL & SURGICAL HISTORY:  No pertinent past medical history    No significant past surgical history    Allergies    No Known Allergies    Intolerances        ICH score 1    24 Hour Events/Subjective:  no acute events overnight       T(C): 36.8 (12-13-21 @ 03:00), Max: 37.2 (12-12-21 @ 19:00)  HR: 96 (12-13-21 @ 07:00) (77 - 100)  BP: 152/72 (12-13-21 @ 07:00) (122/70 - 155/77)  RR: 16 (12-13-21 @ 07:00) (14 - 22)  SpO2: 91% (12-13-21 @ 07:00) (91% - 100%)  12-12-21 @ 07:01  -  12-13-21 @ 07:00  --------------------------------------------------------  IN: 1775 mL / OUT: 2560 mL / NET: -785 mL    acetaminophen     Tablet .. 650 milliGRAM(s) Oral every 6 hours PRN  chlorhexidine 4% Liquid 1 Application(s) Topical <User Schedule>  enoxaparin Injectable 40 milliGRAM(s) SubCutaneous <User Schedule>  influenza   Vaccine 0.5 milliLiter(s) IntraMuscular once  insulin lispro (ADMELOG) corrective regimen sliding scale   SubCutaneous every 6 hours  polyethylene glycol 3350 17 Gram(s) Oral every 12 hours  senna 2 Tablet(s) Oral at bedtime PRN            REVIEW OF SYSTEMS: [x] Unable to Assess due to neurologic exam   [ ] All ROS addressed below are non-contributory, except:  Neuro: [ ] Headache [ ] Back pain [ ] Numbness [ ] Weakness [ ] Ataxia [ ] Dizziness [ ] Aphasia [ ] Dysarthria [ ] Visual disturbance  Resp: [ ] Shortness of breath/dyspnea, [ ] Orthopnea [ ] Cough  CV: [ ] Chest pain [ ] Palpitation [ ] Lightheadedness [ ] Syncope  Renal: [ ] Thirst [ ] Edema  GI: [ ] Nausea [ ] Emesis [ ] Abdominal pain [ ] Constipation [ ] Diarrhea  Hem: [ ] Hematemesis [ ] bright red blood per rectum  ID: [ ] Fever [ ] Chills [ ] Dysuria  ENT: [ ] Rhinorrhea        IMAGING/DATA:   - Reviewed          PHYSICAL EXAM:    General: cooperative  CVS: RR  Pulm: CTAB  GI: Soft, NTND  Extremities: No LE Edema, no hematoma, pulses in tact  Neuro: AOx2 (self, place w/ choices),severe dysarthria, FC, L gaze preference can overcome it, R facial, RUE WD, RLE 2/5, Lt side 5/5          LABS:  Na: 137 (12-12 @ 23:16), 140 (12-11 @ 21:52), 137 (12-11 @ 10:05)  K: 3.7 (12-12 @ 23:16), 3.7 (12-11 @ 21:52), 3.9 (12-11 @ 10:05)  Cl: 104 (12-12 @ 23:16), 107 (12-11 @ 21:52), 104 (12-11 @ 10:05)  CO2: 21 (12-12 @ 23:16), 20 (12-11 @ 21:52), 20 (12-11 @ 10:05)  BUN: 10 (12-12 @ 23:16), 9 (12-11 @ 21:52), 14 (12-11 @ 10:05)  Cr: 0.38 (12-12 @ 23:16), 0.38 (12-11 @ 21:52), 0.40 (12-11 @ 10:05)  Glu: 134(12-12 @ 23:16), 127(12-11 @ 21:52), 129(12-11 @ 10:05)    Hgb: 14.8 (12-12 @ 23:14), 14.2 (12-11 @ 21:52), 15.3 (12-11 @ 10:05)  Hct: 44.6 (12-12 @ 23:14), 42.7 (12-11 @ 21:52), 46.8 (12-11 @ 10:05)  WBC: 11.06 (12-12 @ 23:14), 9.26 (12-11 @ 21:52), 12.36 (12-11 @ 10:05)  Plt: 335 (12-12 @ 23:14), 297 (12-11 @ 21:52), 323 (12-11 @ 10:05)    INR: 1.04 12-11-21 @ 10:05  PTT: 35.0 12-11-21 @ 10:05        ASSESSMENT/PLAN:    55F p/w L BG heme ICH score 1, DSA neg for vascular malformation  likely 2/2 uncontrolled HTN    NEURO:   - neuro checks q 4 hr   -MRI brain wwo pending   - pain control   d/c keppra no need for seizure prophylaxis   - stroke core measures  - Activity: PT/OT; OBC     CVS:  - SBP goal 100-160 mmhg     PULM:  - RA  - aspiration precautions    RENAL:  - IVL   - daily IOs    GI:  - Diet: failed swallow  on TF   - senna and miralax     ENDO:   - FS goal 120-180    HEME/ONC:  - SCDs  - Chemoppx:  lovenox 40 mg sc qhs     ID:  - afebrile on no antibiotics         not critical      Assessment/Hospital Course:    55F w/ no sig pmh, not on AC/AP found down by daughter w/ R weakness and slurred speech after hearing a sound found to have a L 4.5 x 2.4 x 3.6 cm BG IPH with no shift, CTA/angio negative for vascular lesion, likely in the s/o uncontrolled HTN.    PAST MEDICAL & SURGICAL HISTORY:  No pertinent past medical history    No significant past surgical history    Allergies    No Known Allergies    ICH score 1    24 Hour Events/Subjective:  no acute events overnight       T(C): 36.8 (12-13-21 @ 03:00), Max: 37.2 (12-12-21 @ 19:00)  HR: 96 (12-13-21 @ 07:00) (77 - 100)  BP: 152/72 (12-13-21 @ 07:00) (122/70 - 155/77)  RR: 16 (12-13-21 @ 07:00) (14 - 22)  SpO2: 91% (12-13-21 @ 07:00) (91% - 100%)  12-12-21 @ 07:01  -  12-13-21 @ 07:00  --------------------------------------------------------  IN: 1775 mL / OUT: 2560 mL / NET: -785 mL    acetaminophen     Tablet .. 650 milliGRAM(s) Oral every 6 hours PRN  chlorhexidine 4% Liquid 1 Application(s) Topical <User Schedule>  enoxaparin Injectable 40 milliGRAM(s) SubCutaneous <User Schedule>  influenza   Vaccine 0.5 milliLiter(s) IntraMuscular once  insulin lispro (ADMELOG) corrective regimen sliding scale   SubCutaneous every 6 hours  polyethylene glycol 3350 17 Gram(s) Oral every 12 hours  senna 2 Tablet(s) Oral at bedtime PRN            REVIEW OF SYSTEMS: [x] Unable to Assess due to neurologic exam   [ ] All ROS addressed below are non-contributory, except:  Neuro: [ ] Headache [ ] Back pain [ ] Numbness [ ] Weakness [ ] Ataxia [ ] Dizziness [ ] Aphasia [ ] Dysarthria [ ] Visual disturbance  Resp: [ ] Shortness of breath/dyspnea, [ ] Orthopnea [ ] Cough  CV: [ ] Chest pain [ ] Palpitation [ ] Lightheadedness [ ] Syncope  Renal: [ ] Thirst [ ] Edema  GI: [ ] Nausea [ ] Emesis [ ] Abdominal pain [ ] Constipation [ ] Diarrhea  Hem: [ ] Hematemesis [ ] bright red blood per rectum  ID: [ ] Fever [ ] Chills [ ] Dysuria  ENT: [ ] Rhinorrhea        IMAGING/DATA:   - Reviewed    PHYSICAL EXAM:    General: cooperative  CVS: RR  Pulm: CTAB  GI: Soft, NTND  Extremities: No LE Edema, no hematoma, pulses in tact  Neuro: AOx2 (self, place w/ choices),severe dysarthria, FC, L gaze preference can overcome it, R facial, RUE WD, RLE 2/5, Lt side 5/5    LABS:  Na: 137 (12-12 @ 23:16), 140 (12-11 @ 21:52), 137 (12-11 @ 10:05)  K: 3.7 (12-12 @ 23:16), 3.7 (12-11 @ 21:52), 3.9 (12-11 @ 10:05)  Cl: 104 (12-12 @ 23:16), 107 (12-11 @ 21:52), 104 (12-11 @ 10:05)  CO2: 21 (12-12 @ 23:16), 20 (12-11 @ 21:52), 20 (12-11 @ 10:05)  BUN: 10 (12-12 @ 23:16), 9 (12-11 @ 21:52), 14 (12-11 @ 10:05)  Cr: 0.38 (12-12 @ 23:16), 0.38 (12-11 @ 21:52), 0.40 (12-11 @ 10:05)  Glu: 134(12-12 @ 23:16), 127(12-11 @ 21:52), 129(12-11 @ 10:05)    Hgb: 14.8 (12-12 @ 23:14), 14.2 (12-11 @ 21:52), 15.3 (12-11 @ 10:05)  Hct: 44.6 (12-12 @ 23:14), 42.7 (12-11 @ 21:52), 46.8 (12-11 @ 10:05)  WBC: 11.06 (12-12 @ 23:14), 9.26 (12-11 @ 21:52), 12.36 (12-11 @ 10:05)  Plt: 335 (12-12 @ 23:14), 297 (12-11 @ 21:52), 323 (12-11 @ 10:05)    INR: 1.04 12-11-21 @ 10:05  PTT: 35.0 12-11-21 @ 10:05           Assessment/Hospital Course:    55F w/ no sig pmh, not on AC/AP found down by daughter w/ R weakness and slurred speech after hearing a sound found to have a L 4.5 x 2.4 x 3.6 cm BG IPH with no shift, CTA/angio negative for vascular lesion, likely in the s/o uncontrolled HTN.    PAST MEDICAL & SURGICAL HISTORY:  No pertinent past medical history    No significant past surgical history    Allergies    No Known Allergies    ICH score 1    24 Hour Events/Subjective:  no acute events overnight       T(C): 36.8 (12-13-21 @ 03:00), Max: 37.2 (12-12-21 @ 19:00)  HR: 96 (12-13-21 @ 07:00) (77 - 100)  BP: 152/72 (12-13-21 @ 07:00) (122/70 - 155/77)  RR: 16 (12-13-21 @ 07:00) (14 - 22)  SpO2: 91% (12-13-21 @ 07:00) (91% - 100%)  12-12-21 @ 07:01  -  12-13-21 @ 07:00  --------------------------------------------------------  IN: 1775 mL / OUT: 2560 mL / NET: -785 mL    acetaminophen     Tablet .. 650 milliGRAM(s) Oral every 6 hours PRN  chlorhexidine 4% Liquid 1 Application(s) Topical <User Schedule>  enoxaparin Injectable 40 milliGRAM(s) SubCutaneous <User Schedule>  influenza   Vaccine 0.5 milliLiter(s) IntraMuscular once  insulin lispro (ADMELOG) corrective regimen sliding scale   SubCutaneous every 6 hours  polyethylene glycol 3350 17 Gram(s) Oral every 12 hours  senna 2 Tablet(s) Oral at bedtime PRN            REVIEW OF SYSTEMS: [x] Unable to Assess due to neurologic exam   [ ] All ROS addressed below are non-contributory, except:  Neuro: [ ] Headache [ ] Back pain [ ] Numbness [ ] Weakness [ ] Ataxia [ ] Dizziness [ ] Aphasia [ ] Dysarthria [ ] Visual disturbance  Resp: [ ] Shortness of breath/dyspnea, [ ] Orthopnea [ ] Cough  CV: [ ] Chest pain [ ] Palpitation [ ] Lightheadedness [ ] Syncope  Renal: [ ] Thirst [ ] Edema  GI: [ ] Nausea [ ] Emesis [ ] Abdominal pain [ ] Constipation [ ] Diarrhea  Hem: [ ] Hematemesis [ ] bright red blood per rectum  ID: [ ] Fever [ ] Chills [ ] Dysuria  ENT: [ ] Rhinorrhea        IMAGING/DATA:   - Reviewed    PHYSICAL EXAM:  General: cooperative  CVS: RR  Pulm: CTAB  GI: Soft, NTND  Extremities: No LE Edema, no hematoma, pulses in tact  Neuro: AOx2 (self, place w/ choices),severe dysarthria, FC, L gaze preference can overcome it, R facial, RUE WD, RLE 2/5, Lt side 5/5    LABS:  Na: 137 (12-12 @ 23:16), 140 (12-11 @ 21:52), 137 (12-11 @ 10:05)  K: 3.7 (12-12 @ 23:16), 3.7 (12-11 @ 21:52), 3.9 (12-11 @ 10:05)  Cl: 104 (12-12 @ 23:16), 107 (12-11 @ 21:52), 104 (12-11 @ 10:05)  CO2: 21 (12-12 @ 23:16), 20 (12-11 @ 21:52), 20 (12-11 @ 10:05)  BUN: 10 (12-12 @ 23:16), 9 (12-11 @ 21:52), 14 (12-11 @ 10:05)  Cr: 0.38 (12-12 @ 23:16), 0.38 (12-11 @ 21:52), 0.40 (12-11 @ 10:05)  Glu: 134(12-12 @ 23:16), 127(12-11 @ 21:52), 129(12-11 @ 10:05)    Hgb: 14.8 (12-12 @ 23:14), 14.2 (12-11 @ 21:52), 15.3 (12-11 @ 10:05)  Hct: 44.6 (12-12 @ 23:14), 42.7 (12-11 @ 21:52), 46.8 (12-11 @ 10:05)  WBC: 11.06 (12-12 @ 23:14), 9.26 (12-11 @ 21:52), 12.36 (12-11 @ 10:05)  Plt: 335 (12-12 @ 23:14), 297 (12-11 @ 21:52), 323 (12-11 @ 10:05)    INR: 1.04 12-11-21 @ 10:05  PTT: 35.0 12-11-21 @ 10:05

## 2021-12-13 NOTE — SPEECH LANGUAGE PATHOLOGY EVALUATION - SLP PERTINENT HISTORY OF CURRENT PROBLEM
Patient is a 56yo RH F w/ no reported PMHx, not on AC/AP, who was found down by daughter with R weakness and slurred speech. LKN 23:00. Patient was found down by her family on her living room floor after they heard a thud. Patient was on her stomach, and subsequently noted to not be moving her side and to have slurred speech. Patient had reported c/o fatigue just prior to the episode. To family's knowledge, she is not on any medications at home, including AC/AP therapy. She has not followed up with her PCP in nearly two years due to COVID. 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 Pt found to have a L 4.5 x 2.4 x 3.6 cm BG IPH with no shift, CTA/angio negative for vascular lesion, likely in the s/o uncontrolled HTN. Elevated leuks, likely reactive, no focal infectious source, on RA. Failed dysphagia screen. Stroke team consulted

## 2021-12-13 NOTE — SPEECH LANGUAGE PATHOLOGY EVALUATION - SLP GENERAL OBSERVATIONS
Pt encountered in bed, awake and alert, on room air. Daughter/ brother-in-law at bedside. VSS. Pt cooperative with evaluation.

## 2021-12-13 NOTE — SPEECH LANGUAGE PATHOLOGY EVALUATION - COMMENTS
Neuro: AOx2 (self, place w/ choices), expressive aphasia, FC, L gaze preference unable to overcome, R facial, RUE WD, RLE 2/5, Lt side 5/5 Maintain good oral hygiene. Difficult to assess higher-level cognitive abilities 2/2 reduced verbal intelligibility and pt's difficulty sustaining attention for more complex tasks. DNT

## 2021-12-13 NOTE — PROGRESS NOTE ADULT - ASSESSMENT
ASSESSMENT/PLAN:    55F p/w L BG heme ICH score 1, DSA neg for vascular malformation  likely 2/2 uncontrolled HTN    NEURO:   - neuro checks q 4 hr   -MRI brain wwo pending   - pain control   d/c keppra no need for seizure prophylaxis   - stroke core measures  - Activity: PT/OT; OBC     CVS:  - SBP goal 100-160 mmhg     PULM:  - RA  - aspiration precautions    RENAL:  - IVL   - daily IOs    GI:  - Diet: failed swallow  on TF   - senna and miralax     ENDO:   - FS goal 120-180    HEME/ONC:  - SCDs  - Chemoppx:  lovenox 40 mg sc qhs     ID:  - afebrile on no antibiotics         not critical

## 2021-12-13 NOTE — SPEECH LANGUAGE PATHOLOGY EVALUATION - SLP DIAGNOSIS
Pt presents with: 1) Cognitive-linguistic deficits 2) Moderate-severe dysarthria Pt presents with: 1) A cognitive-linguistic impairment. Pt with difficulty following multi-step directives and answering complex yes/no questions. Pt with flat affect, reduced attention, reduced task awareness, and deficits in inferential thought. 2) Moderate-severe dysarthria

## 2021-12-13 NOTE — SPEECH LANGUAGE PATHOLOGY EVALUATION - SLP ATTENTION
impaired S Plasty Text: Given the location and shape of the defect, and the orientation of relaxed skin tension lines, an S-plasty was deemed most appropriate for repair.  Using a sterile surgical marker, the appropriate outline of the S-plasty was drawn, incorporating the defect and placing the expected incisions within the relaxed skin tension lines where possible.  The area thus outlined was incised deep to adipose tissue with a #15 scalpel blade.  The skin margins were undermined to an appropriate distance in all directions utilizing iris scissors. The skin flaps were advanced over the defect.  The opposing margins were then approximated with interrupted buried subcutaneous sutures.

## 2021-12-13 NOTE — CONSULT NOTE ADULT - SUBJECTIVE AND OBJECTIVE BOX
HPI:  55F w/ no sig pmh, not on AC/AP found down by daughter w/ R weakness and slurred speech after hearing a sound found to have a L 4.5 x 2.4 x 3.6 cm BG IPH with no shift, CTA neg. 10hr repeat on Left done at [facility] stable. LKN 10:30 pm.  Exam: awake, eomi, pupils 3R b/l, R facial, FC, expressive aphasia, R hemiparesis 2/5, L side 5/5, no sensation on Right side.  -Adm NSCU, SBP<160  -Hold AC/AP,  -Keppra 500 bid  -Preop Angio today (11 Dec 2021 11:42)    Patient was admitted to NSCU on 12/11, s/p angiogram, follow up imaging stable. Patient seen earlier today, daughter at bedside.     REVIEW OF SYSTEMS  Constitutional - No fever, No weight loss, No fatigue  HEENT - No eye pain, No visual disturbances, No difficulty hearing, No tinnitus, No vertigo, No neck pain  Respiratory - No cough, No wheezing, No shortness of breath  Cardiovascular - No chest pain, No palpitations  Gastrointestinal - No abdominal pain, No nausea, No vomiting, No diarrhea, No constipation  Genitourinary - No dysuria, No frequency, No hematuria, No incontinence  Psychiatric - No depression, No anxiety    VITALS  T(C): 36.8 (12-13-21 @ 03:00), Max: 37.2 (12-12-21 @ 19:00)  HR: 93 (12-13-21 @ 11:00) (77 - 100)  BP: 117/55 (12-13-21 @ 11:00) (117/55 - 177/85)  RR: 20 (12-13-21 @ 08:00) (15 - 22)  SpO2: 93% (12-13-21 @ 11:00) (91% - 100%)  Wt(kg): --    PAST MEDICAL & SURGICAL HISTORY  No pertinent past medical history    No significant past surgical history      SOCIAL HISTORY  Smoking - Denied  EtOH - Denied   Drugs - Denied    FUNCTIONAL HISTORY  Lives with daughter, 18 steps to enter home  Independent AMB and ADLs PTA     CURRENT FUNCTIONAL STATUS  12/13 SLP  cognitive linguistic deficits  mod to severe dysarthria     12/12 SLP  oropharyngeal dysphagia  NPO    12/12 PT  bed mobility max assist    12/12 OT  UB dressing max assist  grooming min assist      FAMILY HISTORY   no pertinent history in first degree relatives     RECENT LABS/IMAGING  CBC Full  -  ( 12 Dec 2021 23:14 )  WBC Count : 11.06 K/uL  RBC Count : 5.32 M/uL  Hemoglobin : 14.8 g/dL  Hematocrit : 44.6 %  Platelet Count - Automated : 335 K/uL  Mean Cell Volume : 83.8 fl  Mean Cell Hemoglobin : 27.8 pg  Mean Cell Hemoglobin Concentration : 33.2 gm/dL  Auto Neutrophil # : x  Auto Lymphocyte # : x  Auto Monocyte # : x  Auto Eosinophil # : x  Auto Basophil # : x  Auto Neutrophil % : x  Auto Lymphocyte % : x  Auto Monocyte % : x  Auto Eosinophil % : x  Auto Basophil % : x    12-12    137  |  104  |  10  ----------------------------<  134<H>  3.7   |  21<L>  |  0.38<L>    Ca    8.8      12 Dec 2021 23:16  Phos  3.0     12-12  Mg     2.2     12-12      < from: CT Head No Cont (12.12.21 @ 08:03) >    IMPRESSION:   unchanged 4.1 cm hemorrhage within the LEFT basal ganglia   with mild effacement of the LEFT lateral ventricle but no midline shift.      < end of copied text >      ALLERGIES  No Known Allergies      MEDICATIONS   acetaminophen     Tablet .. 650 milliGRAM(s) Oral every 6 hours PRN  chlorhexidine 4% Liquid 1 Application(s) Topical <User Schedule>  enoxaparin Injectable 40 milliGRAM(s) SubCutaneous <User Schedule>  influenza   Vaccine 0.5 milliLiter(s) IntraMuscular once  insulin lispro (ADMELOG) corrective regimen sliding scale   SubCutaneous every 6 hours  polyethylene glycol 3350 17 Gram(s) Oral every 12 hours  senna 2 Tablet(s) Oral at bedtime PRN      ----------------------------------------------------------------------------------------  PHYSICAL EXAM  Constitutional - NAD, Comfortable, in bed   Chest - Breathing comfortably  Cardiovascular - S1S2   Abdomen - Soft   Extremities - No C/C/E, No calf tenderness   Neurologic Exam -          follows commands           Cognitive - Awake, Alert, AAO to self, month, year with choices, not place      Communication - +dysarthria        Motor - right hemiplegia      Sensory - Intact to LT     Psychiatric - Mood stable, Affect WNL  ----------------------------------------------------------------------------------------  ASSESSMENT/PLAN  56yFemale with functional deficits after left BG IPH with hemiplegia, dysarthria, dysphagia   on keppra for seizure prophylaxis   MRI pending   bowel regimen  Pain - Tylenol  DVT PPX - SCDs, lovenox   Rehab - Will continue to follow for ongoing rehab needs and recommendations.   continue bedside therapy  out of bed to chair  patient needs to make daily progress with therapy, max assist with bed mobility 12/12    Recommend ACUTE inpatient rehabilitation for the functional deficits consisting of 3 hours of therapy/day & 24 hour RN/daily PMR physician for comorbid medical management. Patient will be able to tolerate 3 hours a day.

## 2021-12-14 LAB
ANION GAP SERPL CALC-SCNC: 13 MMOL/L — SIGNIFICANT CHANGE UP (ref 5–17)
BUN SERPL-MCNC: 16 MG/DL — SIGNIFICANT CHANGE UP (ref 7–23)
CALCIUM SERPL-MCNC: 8.8 MG/DL — SIGNIFICANT CHANGE UP (ref 8.4–10.5)
CHLORIDE SERPL-SCNC: 101 MMOL/L — SIGNIFICANT CHANGE UP (ref 96–108)
CO2 SERPL-SCNC: 22 MMOL/L — SIGNIFICANT CHANGE UP (ref 22–31)
CREAT SERPL-MCNC: 0.44 MG/DL — LOW (ref 0.5–1.3)
GLUCOSE BLDC GLUCOMTR-MCNC: 112 MG/DL — HIGH (ref 70–99)
GLUCOSE BLDC GLUCOMTR-MCNC: 117 MG/DL — HIGH (ref 70–99)
GLUCOSE BLDC GLUCOMTR-MCNC: 120 MG/DL — HIGH (ref 70–99)
GLUCOSE BLDC GLUCOMTR-MCNC: 134 MG/DL — HIGH (ref 70–99)
GLUCOSE SERPL-MCNC: 105 MG/DL — HIGH (ref 70–99)
HCT VFR BLD CALC: 46.5 % — HIGH (ref 34.5–45)
HGB BLD-MCNC: 15.3 G/DL — SIGNIFICANT CHANGE UP (ref 11.5–15.5)
MAGNESIUM SERPL-MCNC: 2.5 MG/DL — SIGNIFICANT CHANGE UP (ref 1.6–2.6)
MCHC RBC-ENTMCNC: 28.2 PG — SIGNIFICANT CHANGE UP (ref 27–34)
MCHC RBC-ENTMCNC: 32.9 GM/DL — SIGNIFICANT CHANGE UP (ref 32–36)
MCV RBC AUTO: 85.8 FL — SIGNIFICANT CHANGE UP (ref 80–100)
NRBC # BLD: 0 /100 WBCS — SIGNIFICANT CHANGE UP (ref 0–0)
PHOSPHATE SERPL-MCNC: 4.1 MG/DL — SIGNIFICANT CHANGE UP (ref 2.5–4.5)
PLATELET # BLD AUTO: 325 K/UL — SIGNIFICANT CHANGE UP (ref 150–400)
POTASSIUM SERPL-MCNC: 4.6 MMOL/L — SIGNIFICANT CHANGE UP (ref 3.5–5.3)
POTASSIUM SERPL-SCNC: 4.6 MMOL/L — SIGNIFICANT CHANGE UP (ref 3.5–5.3)
RBC # BLD: 5.42 M/UL — HIGH (ref 3.8–5.2)
RBC # FLD: 13.9 % — SIGNIFICANT CHANGE UP (ref 10.3–14.5)
SODIUM SERPL-SCNC: 136 MMOL/L — SIGNIFICANT CHANGE UP (ref 135–145)
WBC # BLD: 11.83 K/UL — HIGH (ref 3.8–10.5)
WBC # FLD AUTO: 11.83 K/UL — HIGH (ref 3.8–10.5)

## 2021-12-14 PROCEDURE — 74230 X-RAY XM SWLNG FUNCJ C+: CPT | Mod: 26

## 2021-12-14 RX ADMIN — ENOXAPARIN SODIUM 40 MILLIGRAM(S): 100 INJECTION SUBCUTANEOUS at 18:00

## 2021-12-14 RX ADMIN — CHLORHEXIDINE GLUCONATE 1 APPLICATION(S): 213 SOLUTION TOPICAL at 21:11

## 2021-12-14 RX ADMIN — POLYETHYLENE GLYCOL 3350 17 GRAM(S): 17 POWDER, FOR SOLUTION ORAL at 18:00

## 2021-12-14 RX ADMIN — POLYETHYLENE GLYCOL 3350 17 GRAM(S): 17 POWDER, FOR SOLUTION ORAL at 05:32

## 2021-12-14 NOTE — SWALLOW VFSS/MBS ASSESSMENT ADULT - ASPIRATION PRECAUTIONS
Monitor for s/s aspiration/laryngeal penetration. If noted:  D/C p.o. intake, provide non-oral nutrition/hydration/meds, and contact this service @ [phone]

## 2021-12-14 NOTE — SWALLOW VFSS/MBS ASSESSMENT ADULT - DIAGNOSTIC IMPRESSIONS
Pt presents with a mild oropharyngeal dysphagia. Anterior loss noted with cup sips of liquids. There is prolonged mastication and piecemeal deglutition with more advanced solid textures, with minimal oral residue that is cleared with a liquid wash. There is premature spillage to the hypopharynx with less viscous consistencies. Deep laryngeal penetration to the vocal cords is seen prior to the swallow with sequential sips of thin liquids, though material is eventually retrieved during the swallow. Shallow laryngeal penetration during the swallow with thick puree, mildly thick, and thin liquids is fully retrieved. No aspiration observed on this exam. Pharyngeal clearance is adequate.

## 2021-12-14 NOTE — PROGRESS NOTE ADULT - SUBJECTIVE AND OBJECTIVE BOX
HPI:  55F w/ no sig pmh, not on AC/AP found down by daughter w/ R weakness and slurred speech after hearing a sound found to have a L 4.5 x 2.4 x 3.6 cm BG IPH with no shift, CTA/angio negative for vascular lesion, likely in the s/o uncontrolled HTN.  OVERNIGHT EVENTS:   No acute events overnight.    VITALS:  T(C): , Max: 36.7 (12-13-21 @ 14:00)  HR:  (83 - 101)  BP:  (108/67 - 183/82)  ABP: --  RR:  (14 - 31)  SpO2:  (89% - 96%)  Wt(kg): --      12-13-21 @ 07:01  -  12-14-21 @ 07:00  --------------------------------------------------------  IN: 1200 mL / OUT: 700 mL / NET: 500 mL      LABS:  Na: 136 (12-13 @ 22:17), 137 (12-12 @ 23:16), 140 (12-11 @ 21:52)  K: 4.4 (12-13 @ 22:17), 3.7 (12-12 @ 23:16), 3.7 (12-11 @ 21:52)  Cl: 103 (12-13 @ 22:17), 104 (12-12 @ 23:16), 107 (12-11 @ 21:52)  CO2: 22 (12-13 @ 22:17), 21 (12-12 @ 23:16), 20 (12-11 @ 21:52)  BUN: 16 (12-13 @ 22:17), 10 (12-12 @ 23:16), 9 (12-11 @ 21:52)  Cr: 0.39 (12-13 @ 22:17), 0.38 (12-12 @ 23:16), 0.38 (12-11 @ 21:52)  Glu: 137(12-13 @ 22:17), 134(12-12 @ 23:16), 127(12-11 @ 21:52)    Hgb: 15.8 (12-13 @ 22:17), 14.8 (12-12 @ 23:14), 14.2 (12-11 @ 21:52)  Hct: 48.4 (12-13 @ 22:17), 44.6 (12-12 @ 23:14), 42.7 (12-11 @ 21:52)  WBC: 11.20 (12-13 @ 22:17), 11.06 (12-12 @ 23:14), 9.26 (12-11 @ 21:52)  Plt: 330 (12-13 @ 22:17), 335 (12-12 @ 23:14), 297 (12-11 @ 21:52)    INR:   PTT:     IMAGING:   Recent imaging studies were reviewed.    MEDICATIONS:  acetaminophen     Tablet .. 650 milliGRAM(s) Oral every 6 hours PRN  chlorhexidine 4% Liquid 1 Application(s) Topical <User Schedule>  enoxaparin Injectable 40 milliGRAM(s) SubCutaneous <User Schedule>  influenza   Vaccine 0.5 milliLiter(s) IntraMuscular once  insulin lispro (ADMELOG) corrective regimen sliding scale   SubCutaneous every 6 hours  polyethylene glycol 3350 17 Gram(s) Oral every 12 hours  senna 2 Tablet(s) Oral at bedtime PRN    PHYSICAL EXAM:  General: cooperative  CVS: RR  Pulm: CTAB  GI: Soft, NTND  Extremities: No LE Edema, no hematoma, pulses in tact  Neuro: AOx2 (self, place w/ choices),severe dysarthria, FC, L gaze preference can overcome it, R facial, RUE WD, RLE 2/5, Lt side 5/5

## 2021-12-14 NOTE — SWALLOW VFSS/MBS ASSESSMENT ADULT - ORAL PREPARATORY PHASE
Functional Anterior loss - right side Right side/Anterior loss Prolonged mastication, more significant with harder solid textures Anterior loss - Right side

## 2021-12-14 NOTE — PROGRESS NOTE ADULT - ASSESSMENT
55F p/w L BG heme ICH score 1, DSA neg for vascular malformation  likely 2/2 uncontrolled HTN    NEURO:   - neuro checks q 4 hr   -MRI brain  - stroke core measures  - Activity: PT/OT; OBC     CVS:  - SBP goal 100-160 mmhg     PULM:  - RA  - aspiration precautions    RENAL:  - IVL   - daily IOs    GI:  - Diet: failed swallow  on TF   - senna and miralax     ENDO:   - FS goal 120-180    HEME/ONC:  - SCDs  - Chemoppx:  lovenox 40 mg sc qhs     ID:  - afebrile on no antibiotics     Disposition   -Floor

## 2021-12-14 NOTE — SWALLOW VFSS/MBS ASSESSMENT ADULT - ORAL PHASE COMMENTS
Swallow is triggered as bolus reaches the valleculae x1 instance of laryngeal penetration before the swallow during sequential sips - deep to the vocal cords, though retrieved during subsequent swallows.

## 2021-12-14 NOTE — SWALLOW VFSS/MBS ASSESSMENT ADULT - ADDITIONAL RECOMMENDATIONS
Maintain good oral hygiene.  SLP can consider subjective advancement of diet at the bedside to include more solid textures, pending improvement in oral stage deficits.     GOAL: Pt will tolerate recommended diet without overt signs of laryngeal penetration/aspiration.

## 2021-12-14 NOTE — SWALLOW VFSS/MBS ASSESSMENT ADULT - LARYNGEAL PENETRATION DURING THE SWALLOW - SILENT
Minimal, along the laryngeal surface of the epiglottis, retrieved during the swallow Along the laryngeal surface of the epiglottis, fully retrieved/Trace With cup and straw sips, along the laryngeal surface of the epiglottis; retrieved during the swallow/Trace

## 2021-12-14 NOTE — SWALLOW VFSS/MBS ASSESSMENT ADULT - ROSENBEK'S PENETRATION ASPIRATION SCALE
(2) contrast enters airway, remains above the vocal cords, no residue remains (penetration) (4) contrast contacts vocal cords, no residue remains (penetration) (1) no aspiration, contrast does not enter airway

## 2021-12-14 NOTE — SWALLOW VFSS/MBS ASSESSMENT ADULT - FEEDING ASSISTANCE
Supervision/ assistance during PO intake to ensure patient is taking single sips of liquid/frequent cues/help required

## 2021-12-14 NOTE — SWALLOW VFSS/MBS ASSESSMENT ADULT - ORAL PHASE
Trace oral cavity residue/within functional limits Trace to minimal oral cavity residue Trace to minimal oral cavity residue clears during repeat swallow Premature spillage to the pyriform sinuses; trace oral cavity residue/Incomplete tongue to palate contact Piecemeal deglutition, minimal oral cavity residue is cleared with repeat swallow/ thin liquid wash/Delayed oral transit time

## 2021-12-14 NOTE — SWALLOW VFSS/MBS ASSESSMENT ADULT - SLP GENERAL OBSERVATIONS
Pt encountered in radiology, secure in LIZZ chair. On room air. Pleasant and cooperative with evaluation. Remains dysarthric, though with improved speech intelligibility.

## 2021-12-15 LAB
LMWH PPP CHRO-ACNC: 0.3 IU/ML — LOW (ref 0.5–1.1)
SARS-COV-2 RNA SPEC QL NAA+PROBE: SIGNIFICANT CHANGE UP

## 2021-12-15 PROCEDURE — 99232 SBSQ HOSP IP/OBS MODERATE 35: CPT

## 2021-12-15 PROCEDURE — 70553 MRI BRAIN STEM W/O & W/DYE: CPT | Mod: 26

## 2021-12-15 RX ADMIN — ENOXAPARIN SODIUM 40 MILLIGRAM(S): 100 INJECTION SUBCUTANEOUS at 17:08

## 2021-12-15 RX ADMIN — POLYETHYLENE GLYCOL 3350 17 GRAM(S): 17 POWDER, FOR SOLUTION ORAL at 05:50

## 2021-12-15 NOTE — PROGRESS NOTE ADULT - SUBJECTIVE AND OBJECTIVE BOX
SUBJECTIVE: Doing well, No complaints. NAD    OVERNIGHT EVENTS: None    Vital Signs Last 24 Hrs  T(C): 36.7 (15 Dec 2021 08:56), Max: 37 (14 Dec 2021 12:00)  T(F): 98.1 (15 Dec 2021 08:56), Max: 98.6 (14 Dec 2021 12:00)  HR: 87 (15 Dec 2021 08:56) (77 - 100)  BP: 129/69 (15 Dec 2021 08:56) (119/64 - 148/81)  BP(mean): 83 (15 Dec 2021 04:00) (82 - 107)  RR: 21 (15 Dec 2021 08:56) (12 - 39)  SpO2: 95% (15 Dec 2021 08:56) (87% - 96%)  IVF: [X ] IVL [ ] NS+K@   DIET: [X ] Regular-Soft [ ] CCD [ ] Renal [ ] Puree [ ] Dysphagia [ ] Tube Feeds:   PCA: [ ] YES [ X] NO   SANDHU: [ ] YES [X ] NO [X ] VOID Primafit on  BM: [ X] YES-on 12/12    DRAINS: NA    PHYSICAL EXAM:    General: No Acute Distress     Neurological:   AAOx3. EOMI. Mild Rt facia land exp aphasia. RUE 1/5, RLE 3/5, Lt side 5/5. Decrease sensory rt side.    Pulmonary: Clear to Auscultation, No Rales, No Rhonchi, No Wheezes     Cardiovascular: S1, S2, Regular Rate and Rhythm     Gastrointestinal: Soft, Nontender, Nondistended     Incision: CDI/Flat.    LABS:                        15.3   11.83 )-----------( 325      ( 14 Dec 2021 22:12 )             46.5    12-14    136  |  101  |  16  ----------------------------<  105<H>  4.6   |  22  |  0.44<L>    Ca    8.8      14 Dec 2021 22:12  Phos  4.1     12-14  Mg     2.5     12-14 12-14 @ 07:01  -  12-15 @ 07:00  --------------------------------------------------------  IN: 0 mL / OUT: 700 mL / NET: -700 mL    12-15 @ 07:01  -  12-15 @ 10:15  --------------------------------------------------------  IN: 400 mL / OUT: 0 mL / NET: 400 mL    < from: TTE with Doppler (w/Cont) (12.13.21 @ 07:55) >  Conclusions:  1. Normal mitral valve. Minimal mitral regurgitation.  2. Normal trileaflet aortic valve. No aortic valve  regurgitation seen.  3. Endocardial visualization enhanced with intravenous  injection of Ultrasonic Enhancing Agent (Definity). Overall  preserved left ventricular systolic function. No left  ventricular thrombus. The basal inferior wall, and the mid  inferior wall are hypokinetic.  4. Mild diastolic dysfunction (Stage I).  5. Normal right ventricular size and systolic function.  *** No previous Echo exam.    IMAGING:   < from: VA Duplex Lower Ext Vein Scan, Bilat (12.12.21 @ 17:56) >  No evidence of deep venous thrombosis in either lower extremity.    < from: Xray Chest 1 View- PORTABLE-Urgent (Xray Chest 1 View- PORTABLE-Urgent .) (12.12.21 @ 11:39) >    Enteric tube terminates in the stomach.    < from: CT Head No Cont (12.12.21 @ 08:03) >    IMPRESSION:   unchanged 4.1 cm hemorrhage within the LEFT basal ganglia   with mild effacement of the LEFT lateral ventricle but no midline shift.    MEDICATIONS  (STANDING):  chlorhexidine 4% Liquid 1 Application(s) Topical <User Schedule>  enoxaparin Injectable 40 milliGRAM(s) SubCutaneous <User Schedule>  influenza   Vaccine 0.5 milliLiter(s) IntraMuscular once  polyethylene glycol 3350 17 Gram(s) Oral every 12 hours    MEDICATIONS  (PRN):  acetaminophen     Tablet .. 650 milliGRAM(s) Oral every 6 hours PRN Temp greater or equal to 38C (100.4F), Mild Pain (1 - 3)  senna 2 Tablet(s) Oral at bedtime PRN Constipation

## 2021-12-15 NOTE — PROGRESS NOTE ADULT - ASSESSMENT
55F w/ no sig pmh, not on AC/AP found down by daughter w/ R weakness and slurred speech after hearing a sound found to have a L 4.5 x 2.4 x 3.6 cm BG IPH with no shift, CTA neg. 10hr repeat on Left done at [facility] stable. LKN 10:30 pm.  Exam: awake, eomi, pupils 3R b/l, R facial, FC, expressive aphasia, R hemiparesis 2/5, L side 5/5, no sensation on Right side.  -Adm NSCU, SBP<160  -Hold AC/AP,  -Keppra 500 bid  -Preop Angio today (11 Dec 2021 11:42)    PROCEDURE:  Adm 12/11  Lt BG Hem. 12/11 Angio Neg  POD#4    PLAN:  Neuro: MRI Brain w/wo ordered now FU. TTE done. Off Keppra, BP contyrol 140/90.  Inc activity/OOB. DC planning after MRI.    Neurology note of 12/12-Patient is a 56yo RH F w/ no reported PMHx, not on AC/AP, who was found down by daughter with R weakness and slurred speech found to have L BG IPH, now stable on repeat CTH and with unrevealing CTA and angiogram. [facility] Source of IPH is presumed to be from hypertension. Recommendations: - BP control goal <160/90. - PT/OT. - TTE. - keppra 500BID. To be discussed with neurology attending and will be formally staffed by attending during morning rounds. Recommendations will be finalized once signed by attending.  Attestation Statements: Attending supervision statement: I have personally seen and examined the patient.  I fully participated in the care of this patient.  I have made amendments to the documentation where necessary, and agree with the history, physical exam, and plan as documented by the ACP and Fellow. Ms. Dillard is a 55-year-old woman with no significant past medical history per family, not diagnosed to be hypertensive.  However, she has not followed up with her primary care in the last 2 years due to Covid. She was found down by her daughter, presented with right sided weakness and dysarthria.  On noncontrast head CT found to have left intracerebral basal ganglia hemorrhage. Cerebral angiogram performed by neurosurgical service did not reveal any vascular malformation or lesions. Today she is easily arousable, has severe dysarthria,, right hemiplegia Impression: Left basal ganglia intracerebral hemorrhage based on location appears to be related to uncontrolled hypertension However, recommend MRI brain with and without contrast Catheter cerebral angiogram failed to reveal vascular formations or lesions. blood pressure control, goal blood pressure <140/90 PT OT PMR assessment . Electronic Signatures: José Buchanan)    S & S saw pt on 12/14-Soft and bite sized textures with thin liquids. Encourage single sips! maintain upright posture during/after eating for 30 mins    Respiratory: Patient instructed to use incentive spirometer [ X] YES [ ] NO              DVT ppx: [X ] SQL [ ] SQH and Venodynes [ ] Left [ ] Right [ X] Bilateral    Discharge Planning:  The patient was evaluated by PT/OT/PMR and recommended Acute Rehab.       More than 30 minutes spent on total encounter: more than 50% of the visit was spent on educating the patient and family regarding condition, medications, follow up plans, signs and symptoms to be concerned with, preparing paperwork, and questions answered regarding discharge.

## 2021-12-15 NOTE — PROGRESS NOTE ADULT - ATTENDING COMMENTS
Angio negative BG ICH, most likely from undiagnosed HTN. Management per neurology.
unchanged IPH.  no plan for neurosurgical intervention.  can be transferred out of ICU.
d/w fellow
Angio negative, presumed hypertensive L BG hemorrhage. Non-surgical. Neurology recs for management and follow up.

## 2021-12-15 NOTE — PROGRESS NOTE ADULT - TIME BILLING
More than 30 minutes spent on total encounter: more than 50% of the visit was spent on educating the patient and family regarding condition, medications, follow up plans, signs and symptoms to be concerned with, preparing paperwork, and questions answered regarding discharge.
see note
hemorrhagic stroke

## 2021-12-16 ENCOUNTER — TRANSCRIPTION ENCOUNTER (OUTPATIENT)
Age: 56
End: 2021-12-16

## 2021-12-16 LAB
ANION GAP SERPL CALC-SCNC: 11 MMOL/L — SIGNIFICANT CHANGE UP (ref 5–17)
BUN SERPL-MCNC: 18 MG/DL — SIGNIFICANT CHANGE UP (ref 7–23)
CALCIUM SERPL-MCNC: 9.1 MG/DL — SIGNIFICANT CHANGE UP (ref 8.4–10.5)
CHLORIDE SERPL-SCNC: 103 MMOL/L — SIGNIFICANT CHANGE UP (ref 96–108)
CO2 SERPL-SCNC: 24 MMOL/L — SIGNIFICANT CHANGE UP (ref 22–31)
CREAT SERPL-MCNC: 0.4 MG/DL — LOW (ref 0.5–1.3)
GLUCOSE SERPL-MCNC: 112 MG/DL — HIGH (ref 70–99)
HCT VFR BLD CALC: 47.8 % — HIGH (ref 34.5–45)
HGB BLD-MCNC: 15.5 G/DL — SIGNIFICANT CHANGE UP (ref 11.5–15.5)
MAGNESIUM SERPL-MCNC: 2.4 MG/DL — SIGNIFICANT CHANGE UP (ref 1.6–2.6)
MCHC RBC-ENTMCNC: 28.1 PG — SIGNIFICANT CHANGE UP (ref 27–34)
MCHC RBC-ENTMCNC: 32.4 GM/DL — SIGNIFICANT CHANGE UP (ref 32–36)
MCV RBC AUTO: 86.6 FL — SIGNIFICANT CHANGE UP (ref 80–100)
NRBC # BLD: 0 /100 WBCS — SIGNIFICANT CHANGE UP (ref 0–0)
PHOSPHATE SERPL-MCNC: 4.3 MG/DL — SIGNIFICANT CHANGE UP (ref 2.5–4.5)
PLATELET # BLD AUTO: 302 K/UL — SIGNIFICANT CHANGE UP (ref 150–400)
POTASSIUM SERPL-MCNC: 3.9 MMOL/L — SIGNIFICANT CHANGE UP (ref 3.5–5.3)
POTASSIUM SERPL-SCNC: 3.9 MMOL/L — SIGNIFICANT CHANGE UP (ref 3.5–5.3)
RBC # BLD: 5.52 M/UL — HIGH (ref 3.8–5.2)
RBC # FLD: 13.3 % — SIGNIFICANT CHANGE UP (ref 10.3–14.5)
SODIUM SERPL-SCNC: 138 MMOL/L — SIGNIFICANT CHANGE UP (ref 135–145)
WBC # BLD: 9.31 K/UL — SIGNIFICANT CHANGE UP (ref 3.8–10.5)
WBC # FLD AUTO: 9.31 K/UL — SIGNIFICANT CHANGE UP (ref 3.8–10.5)

## 2021-12-16 PROCEDURE — 99232 SBSQ HOSP IP/OBS MODERATE 35: CPT

## 2021-12-16 PROCEDURE — 99233 SBSQ HOSP IP/OBS HIGH 50: CPT

## 2021-12-16 RX ADMIN — POLYETHYLENE GLYCOL 3350 17 GRAM(S): 17 POWDER, FOR SOLUTION ORAL at 05:30

## 2021-12-16 RX ADMIN — ENOXAPARIN SODIUM 40 MILLIGRAM(S): 100 INJECTION SUBCUTANEOUS at 17:19

## 2021-12-16 NOTE — PROGRESS NOTE ADULT - ASSESSMENT
Patient is a 56yo RH F w/ no reported PMHx, not on AC/AP, who was found down by daughter with R weakness and slurred speech found to have L BG IPH, now stable on repeat CTH and MRI brain and with unrevealing CTA and angiogram.     Recommendations:  -Repeat MRI head w and w/o contrast in 6-8 weeks  - BP control goal <160/90, or as per NSICU  - Repeat CTH and MRI have been stable   - q2 neuro checks, vitals    Case discussed with stroke attending, Dr. Buchanan.

## 2021-12-16 NOTE — DISCHARGE NOTE PROVIDER - HOSPITAL COURSE
55F w/ no sig pmh, not on AC/AP found down by daughter w/ R weakness and slurred speech after hearing a sound found to have a L 4.5 x 2.4 x 3.6 cm BG IPH with no shift, CTA neg. 10hr repeat on Left done at [facility] stable. LKN 10:30 pm. She is  s/p angiogram on 12/11 which was negative  vascular malformation  and likely secondary to uncontrolled HTN. She was seen by PT and they recommended AR. On the day of discharge patient is neurologically and medically stable and clear for discharge to rehab. 55F w/ no sig pmh, not on AC/AP found down by daughter w/ R weakness and slurred speech after hearing a sound found to have a L 4.5 x 2.4 x 3.6 cm BG IPH with no shift, CTA neg. 10hr repeat on Left done at [facility] stable. LKN 10:30 pm. She is  s/p angiogram on 12/11 which was negative for vascular malformation  and likely secondary to uncontrolled HTN. She was seen by PT and they recommended AR. On the day of discharge patient is neurologically and medically stable and clear for discharge to rehab.

## 2021-12-16 NOTE — PROGRESS NOTE ADULT - ASSESSMENT
55F w/ no sig pmh, not on AC/AP found down by daughter w/ R weakness and slurred speech after hearing a sound found to have a L 4.5 x 2.4 x 3.6 cm BG IPH with no shift, CTA neg. 10hr repeat on Left done at [facility] stable. LKN 10:30 pm.    PROCEDURE: angio  on 12/11 negative   PAD#5    PLAN:  -Neuro: neuro stable  -Continue neuro and vitals q 4 hours  -Tylenol for pain control  -Neurology following. Recommennd 1)  Repeat MRI head w and w/o contrast in 6-8 weeks 2) BP control goal <160/90, or as per NSICU 3) Repeat CTH and MRI have been stable    -Encouraged incentive spirometry   -Leukocytosis resolved  -Soft diet  -DVT ppx: venodynes and sql  -PT/OT/PMR: AR pending auth     Will discuss above with Dr. Flores  Spectra #[id number]

## 2021-12-16 NOTE — DISCHARGE NOTE PROVIDER - NSDCCPCAREPLAN_GEN_ALL_CORE_FT
PRINCIPAL DISCHARGE DIAGNOSIS  Diagnosis: Intraparenchymal hemorrhage of brain  Assessment and Plan of Treatment: s/p angiogram on 12/11 which was negative  vascular malformation  Please follow up with Dr. Flores after discharge.

## 2021-12-16 NOTE — DISCHARGE NOTE PROVIDER - NSDCMRMEDTOKEN_GEN_ALL_CORE_FT
acetaminophen 325 mg oral tablet: 2 tab(s) orally every 6 hours, As needed, Temp greater or equal to 38C (100.4F), Mild Pain (1 - 3)  enoxaparin: 40 unit(s) subcutaneous once a day (at bedtime)  polyethylene glycol 3350 oral powder for reconstitution: 17 gram(s) orally every 12 hours  senna oral tablet: 2 tab(s) orally once a day (at bedtime), As needed, Constipation

## 2021-12-16 NOTE — DISCHARGE NOTE PROVIDER - NSDCACTIVITY_GEN_ALL_CORE
Do not drive or operate machinery/Do not make important decisions/Stairs allowed/Walking - Indoors allowed/No heavy lifting/straining/Follow Instructions Provided by your Surgical Team

## 2021-12-16 NOTE — PROGRESS NOTE ADULT - SUBJECTIVE AND OBJECTIVE BOX
SUBJECTIVE: Patient seen and examined at bedside. Denies any complaints at this time.     OVERNIGHT EVENTS: none     Vital Signs Last 24 Hrs  T(C): 36.7 (16 Dec 2021 12:20), Max: 37.1 (15 Dec 2021 16:14)  T(F): 98 (16 Dec 2021 12:20), Max: 98.7 (15 Dec 2021 16:14)  HR: 80 (16 Dec 2021 12:20) (80 - 98)  BP: 127/74 (16 Dec 2021 12:20) (119/77 - 137/84)  BP(mean): --  RR: 20 (16 Dec 2021 12:20) (18 - 20)  SpO2: 94% (16 Dec 2021 12:20) (94% - 98%)    PHYSICAL EXAM:    General: No Acute Distress     Neurological:  AAOx3. EOMI. Mild Rt facial land exp aphasia. RUE 1/5, RLE 3/5, Lt side 5/5. Decrease sensory rt side.    Pulmonary: Clear to Auscultation, No Rales, No Rhonchi, No Wheezes     Cardiovascular: S1, S2, Regular Rate and Rhythm     Gastrointestinal: Soft, Nontender, Nondistended     Incision: c/d/i     LABS:                        15.5   9.31  )-----------( 302      ( 16 Dec 2021 06:15 )             47.8    12-16    138  |  103  |  18  ----------------------------<  112<H>  3.9   |  24  |  0.40<L>    Ca    9.1      16 Dec 2021 06:14  Phos  4.3     12-16  Mg     2.4     12-16          12-15 @ 07:01  -  12-16 @ 07:00  --------------------------------------------------------  IN: 640 mL / OUT: 550 mL / NET: 90 mL    12-16 @ 07:01  -  12-16 @ 13:14  --------------------------------------------------------  IN: 360 mL / OUT: 0 mL / NET: 360 mL    MEDICATIONS  (STANDING):  enoxaparin Injectable 40 milliGRAM(s) SubCutaneous <User Schedule>  influenza   Vaccine 0.5 milliLiter(s) IntraMuscular once  polyethylene glycol 3350 17 Gram(s) Oral every 12 hours    MEDICATIONS  (PRN):  acetaminophen     Tablet .. 650 milliGRAM(s) Oral every 6 hours PRN Temp greater or equal to 38C (100.4F), Mild Pain (1 - 3)  senna 2 Tablet(s) Oral at bedtime PRN Constipation      DIET: soft diet     IMAGING: < from: MR Head w/wo IV Cont (12.15.21 @ 18:11) >  IMPRESSION:    -4.8 cm left basal ganglia hemorrhage, unchanged. No underlying mass   identified.    -Small acute infarcts are noted involving the left superior and middle   frontal gyri.    --- End of Report ---    < end of copied text >

## 2021-12-16 NOTE — PROGRESS NOTE ADULT - SUBJECTIVE AND OBJECTIVE BOX
INCOMPLETE  HPI:  HPI: Patient is a 56yo RH F w/ no reported PMHx, not on AC/AP, who was found down by daughter with R weakness and slurred speech. LKN 23:00. Patient was found down by her family on her living room floor after they heard a thud. Patient was on her stomach, and subsequently noted to not be moving her side and to have slurred speech. Patient had reported c/o fatigue just prior to the episode. To family's knowledge, she is not on any medications at home, including AC/AP therapy. She has not followed up with her PCP in nearly two years due to COVID. 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     LKN 23:00 on 12/10/21  NIHSS: 14  Baseline mRS: 0  Not a candidate for tPA or MT due to IPH.    Imaging showed left 4.5 x 2.4 x 3.6 cm BG IPH with no shift. 10hr repeat done at [facility] stable.    YVETTE MRN [id number]    Pt seen at bedside today. No acute complaints.          ROS: A 10-system ROS was performed and is negative except for those items noted above and/or in the HPI.    PAST MEDICAL & SURGICAL HISTORY:  No pertinent past medical history    No significant past surgical history      FAMILY HISTORY:      SOCIAL HISTORY: SOCIAL HISTORY:     Marital Status: (  )   (  ) Single  (  )   (  )      Occupation:      Lives: (  ) alone  (  ) with children   (  ) with spouse  (  ) with parents  (  ) other     Illicit Drug Use: (  ) never used  (  ) other _____     Tobacco Use:  (  ) never smoked  (  ) former smoker  (  ) current smoker  (  ) pack year  (  ) last cigarette date     Alcohol Use:      Sexual History:        MEDICATIONS  Home Medications:      MEDICATIONS  (STANDING):  enoxaparin Injectable 40 milliGRAM(s) SubCutaneous <User Schedule>  influenza   Vaccine 0.5 milliLiter(s) IntraMuscular once  polyethylene glycol 3350 17 Gram(s) Oral every 12 hours    MEDICATIONS  (PRN):  acetaminophen     Tablet .. 650 milliGRAM(s) Oral every 6 hours PRN Temp greater or equal to 38C (100.4F), Mild Pain (1 - 3)  senna 2 Tablet(s) Oral at bedtime PRN Constipation      ALLERGIES/INTOLERANCES:  Allergies  No Known Allergies    Intolerances      OBJECTIVE:  VITALS   Vital Signs Last 24 Hrs  T(C): 36.7 (16 Dec 2021 12:20), Max: 37.1 (15 Dec 2021 16:14)  T(F): 98 (16 Dec 2021 12:20), Max: 98.7 (15 Dec 2021 16:14)  HR: 80 (16 Dec 2021 12:20) (80 - 98)  BP: 127/74 (16 Dec 2021 12:20) (119/77 - 137/84)  BP(mean): --  RR: 20 (16 Dec 2021 12:20) (18 - 20)  SpO2: 94% (16 Dec 2021 12:20) (94% - 98%)    PHYSICAL EXAM:  Neurological Exam:    Constitutional: Female, appears stated age, lethargic, requiring verbal stimuli to awaken  Head: Normocephalic  Eyes: clear sclera;  Extremities: No cyanosis, clubbing,    Resp: breathing comfortably, normal rate    Neurological (>12):  MS: intermittently awake, difficult to assess orientation. Lethargic. Follows some commands.  Language: Speech is moderately dysarthric. hypophonic. Able to identify 3/3 objects. Understands. Could not assess repetition.     CNs: pupils equal round and roving. Able to cross midline volitionally. difficult to assess VF. EOMI + nystagmus L gaze.  mild facial asymmetry      Motor:               Deltoid	Biceps	Triceps	   R	0	0	0	0		 	  L	4+	4+	4 	4+		  	H-Flex	K-Ext	D-Flex	P-Flex  R	0	0	0	0			 	   L	4+	-	5	5		     Sensation: Intact  absent R side     coordination/ gait: cannot assess        LABORATORY:  CBC                       15.5   9.31  )-----------( 302      ( 16 Dec 2021 06:15 )             47.8     Chem 12-16    138  |  103  |  18  ----------------------------<  112<H>  3.9   |  24  |  0.40<L>    Ca    9.1      16 Dec 2021 06:14  Phos  4.3     12-16  Mg     2.4     12-16      LFTs   Coagulopathy   Lipid Panel 12-11 Chol 200<H> LDL -- HDL 55 Trig 113  A1c   Cardiac enzymes     U/A   CSF  Immunological  Other    STUDIES & IMAGING:  Studies (EKG, EEG, EMG, etc):     Radiology (XR, CT, MR, U/S, TTE/LUCIA):    CT Brain Stroke Protocol (12.10.21 @ 23:50) >  FINDINGS:  The study is mildly degraded by motion/streak artifact.    Acute intraparenchymal hemorrhage within the left basal ganglia measuring approximately 4.5 x 2.4 x 3.6 cm (ap x trv x cc). There is surrounding vasogenic edema with mass effect and partial effacement of the left lateral ventricle.    The basal cisterns are patent. There is no hydrocephalus. There is no midline shift.    The visualized paranasal sinuses and mastoid air cells are clear.    The calvarium is intact.    IMPRESSION:  Acute left basal ganglia intraparenchymal hematoma with partial effacement of the left lateral ventricle. No midline shift.    < end of copied text >  < from: CT Angio Head w/ IV Cont (12.11.21 @ 00:06) >    FINDINGS:    CT ANGIOGRAPHY NECK:  There is no evidence for significant stenosis or major vessel occlusion involving the bilateral carotid arteries.    There is no evidence for significant stenosis or major vessel occlusion involving the bilateral vertebral arteries. Left dominant vertebral   system.    The neck and upper chest are unremarkable.    There are degenerative changes in visualized spine.      CT ANGIOGRAPHY BRAIN:  There are mild atherosclerotic calcification of the right cavernous ICA.   There is no evidence for significant stenosis, major vessel occlusion, or aneurysm about the [place] of [place].    There is no evidence for significant stenosis, major vessel occlusion, or aneurysm involving the vertebrobasilar system.    No enlarged vascular lesions or clusters of abnormal vessels are noted to suggest an arterial venous malformation within the field-of-view.    Visualized portions of the superficial and deep venous systems are unremarkable.      IMPRESSION:    CT angiography neck:  No evidence of hemodynamically significant stenosis using NASCET criteria. Patent vertebral arteries. No evidence of vascular dissection.    CT angiography brain:  No major vessel occlusion or proximal stenosis. No dissection, saccular aneurysm, or AVM.    --- End of Report ---    < end of copied text >        < from: CT Head No Cont (12.12.21 @ 08:03) >    ACC: [id number] EXAM:  CT BRAIN                          PROCEDURE DATE:  12/12/2021      INTERPRETATION:  CT head without IV contrast    CLINICAL INFORMATION:  Follow-up intracranial   Intracranial hemorrhage.    TECHNIQUE: Contiguous axial 5 mmsections were obtained through the head.    This scan was performed using automatic exposure control (radiation dose   reduction software) to obtain a diagnostic image quality scan with   patient dose as low as reasonably achievable.    FINDINGS:   12/11/2021 available for review.    The brain demonstrates unchanged 4.1 cm hemorrhage within the LEFT basal   ganglia with mild effacement of the LEFT lateral ventricle but no midline   shift.   No acute cerebral cortical infarct is seen.  No intracranial   hemorrhage is found.  No mass effect is found in the brain.    The ventricles, sulci and basal cisterns appear unremarkable.    The orbits are unremarkable.  The paranasal sinuses are clear.  The nasal   cavity appears intact.  The nasopharynx is symmetric.  The central skull   base, petrous temporal bones and calvarium remain intact.      IMPRESSION:   unchanged 4.1 cm hemorrhage within the LEFT basal ganglia   with mild effacement of the LEFT lateral ventricle but no midline shift.    ---End of Report ---    NITA MARTI MD; Attending Radiologist  This document has been electronically signed. Dec 12 2021  8:49AM    < end of copied text >      CT head (12/12/21):   unchanged 4.1 cm hemorrhage within the LEFT basal ganglia   with mild effacement of the LEFT lateral ventricle but no midline shift.      MR brain:   -4.8 cm left basal ganglia hemorrhage, unchanged. No underlying mass   identified.    -Small acute infarcts are noted involving the left superior and middle   frontal gyri.   INCOMPLETE  HPI:  HPI: Patient is a 54yo RH F w/ no reported PMHx, not on AC/AP, who was found down by daughter with R weakness and slurred speech. LKN 23:00. Patient was found down by her family on her living room floor after they heard a thud. Patient was on her stomach, and subsequently noted to not be moving her side and to have slurred speech. Patient had reported c/o fatigue just prior to the episode. To family's knowledge, she is not on any medications at home, including AC/AP therapy. She has not followed up with her PCP in nearly two years due to COVID. 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     LKN 23:00 on 12/10/21  NIHSS: 14  Baseline mRS: 0  Not a candidate for tPA or MT due to IPH.    Imaging showed left 4.5 x 2.4 x 3.6 cm BG IPH with no shift. 10hr repeat done at [facility] stable.    YVETTE MRN [id number]    Pt seen at bedside today with daughter at bedside. No acute complaints.          ROS: A 10-system ROS was performed and is negative except for those items noted above and/or in the HPI.    PAST MEDICAL & SURGICAL HISTORY:  No pertinent past medical history    No significant past surgical history      FAMILY HISTORY:      SOCIAL HISTORY: SOCIAL HISTORY:     Marital Status: (  )   (  ) Single  (  )   (  )      Occupation:      Lives: (  ) alone  (  ) with children   (  ) with spouse  (  ) with parents  (  ) other     Illicit Drug Use: (  ) never used  (  ) other _____     Tobacco Use:  (  ) never smoked  (  ) former smoker  (  ) current smoker  (  ) pack year  (  ) last cigarette date     Alcohol Use:      Sexual History:        MEDICATIONS  Home Medications:      MEDICATIONS  (STANDING):  enoxaparin Injectable 40 milliGRAM(s) SubCutaneous <User Schedule>  influenza   Vaccine 0.5 milliLiter(s) IntraMuscular once  polyethylene glycol 3350 17 Gram(s) Oral every 12 hours    MEDICATIONS  (PRN):  acetaminophen     Tablet .. 650 milliGRAM(s) Oral every 6 hours PRN Temp greater or equal to 38C (100.4F), Mild Pain (1 - 3)  senna 2 Tablet(s) Oral at bedtime PRN Constipation      ALLERGIES/INTOLERANCES:  Allergies  No Known Allergies    Intolerances      OBJECTIVE:  VITALS   Vital Signs Last 24 Hrs  T(C): 36.7 (16 Dec 2021 12:20), Max: 37.1 (15 Dec 2021 16:14)  T(F): 98 (16 Dec 2021 12:20), Max: 98.7 (15 Dec 2021 16:14)  HR: 80 (16 Dec 2021 12:20) (80 - 98)  BP: 127/74 (16 Dec 2021 12:20) (119/77 - 137/84)  BP(mean): --  RR: 20 (16 Dec 2021 12:20) (18 - 20)  SpO2: 94% (16 Dec 2021 12:20) (94% - 98%)    PHYSICAL EXAM:  Neurological Exam:    Constitutional: Female, appears stated age, somewhat lethargic, requiring verbal stimuli to awaken  Head: Normocephalic  Eyes: clear sclera;  Extremities: No cyanosis, clubbing,    Resp: breathing comfortably, normal rate    Neurological (>12):  MS: intermittently awake, oriented to self, daugther, year state. Somewhat Lethargic.  Language: Speech is mild to moderately dysarthric, slurred speech. hypophonic. Able to identify a pen.  Follows 1 step commands. Can repeat a sentence.    CNs: pupils equal round and roving. Able to cross midline volitionally. VFF. EOMI. R lower facial droop     Motor:               Deltoid	Biceps	Triceps	   R	3-/5	0	0	0		 	  L	4+	4+	4 	4+		  	H-Flex	K-Ext	D-Flex	P-Flex  R	Some effort against gravity- can lift leg about 2 inches for few seconds			 	   L	4+	-	5	5		     Sensation: Decreased sensation to light touch and pin prick in RUE, RLE.     coordination/ gait: No dysmetria in LUE, LLE.         LABORATORY:  CBC                       15.5   9.31  )-----------( 302      ( 16 Dec 2021 06:15 )             47.8     Chem 12-16    138  |  103  |  18  ----------------------------<  112<H>  3.9   |  24  |  0.40<L>    Ca    9.1      16 Dec 2021 06:14  Phos  4.3     12-16  Mg     2.4     12-16      LFTs   Coagulopathy   Lipid Panel 12-11 Chol 200<H> LDL -- HDL 55 Trig 113  A1c   Cardiac enzymes     U/A   CSF  Immunological  Other    STUDIES & IMAGING:  Studies (EKG, EEG, EMG, etc):     Radiology (XR, CT, MR, U/S, TTE/LUCIA):    CT Brain Stroke Protocol (12.10.21 @ 23:50) >  FINDINGS:  The study is mildly degraded by motion/streak artifact.    Acute intraparenchymal hemorrhage within the left basal ganglia measuring approximately 4.5 x 2.4 x 3.6 cm (ap x trv x cc). There is surrounding vasogenic edema with mass effect and partial effacement of the left lateral ventricle.    The basal cisterns are patent. There is no hydrocephalus. There is no midline shift.    The visualized paranasal sinuses and mastoid air cells are clear.    The calvarium is intact.    IMPRESSION:  Acute left basal ganglia intraparenchymal hematoma with partial effacement of the left lateral ventricle. No midline shift.    < end of copied text >  < from: CT Angio Head w/ IV Cont (12.11.21 @ 00:06) >    FINDINGS:    CT ANGIOGRAPHY NECK:  There is no evidence for significant stenosis or major vessel occlusion involving the bilateral carotid arteries.    There is no evidence for significant stenosis or major vessel occlusion involving the bilateral vertebral arteries. Left dominant vertebral   system.    The neck and upper chest are unremarkable.    There are degenerative changes in visualized spine.      CT ANGIOGRAPHY BRAIN:  There are mild atherosclerotic calcification of the right cavernous ICA.   There is no evidence for significant stenosis, major vessel occlusion, or aneurysm about the [place] of [place].    There is no evidence for significant stenosis, major vessel occlusion, or aneurysm involving the vertebrobasilar system.    No enlarged vascular lesions or clusters of abnormal vessels are noted to suggest an arterial venous malformation within the field-of-view.    Visualized portions of the superficial and deep venous systems are unremarkable.      IMPRESSION:    CT angiography neck:  No evidence of hemodynamically significant stenosis using NASCET criteria. Patent vertebral arteries. No evidence of vascular dissection.    CT angiography brain:  No major vessel occlusion or proximal stenosis. No dissection, saccular aneurysm, or AVM.    --- End of Report ---    < end of copied text >        < from: CT Head No Cont (12.12.21 @ 08:03) >    ACC: [id number] EXAM:  CT BRAIN                          PROCEDURE DATE:  12/12/2021      INTERPRETATION:  CT head without IV contrast    CLINICAL INFORMATION:  Follow-up intracranial   Intracranial hemorrhage.    TECHNIQUE: Contiguous axial 5 mmsections were obtained through the head.    This scan was performed using automatic exposure control (radiation dose   reduction software) to obtain a diagnostic image quality scan with   patient dose as low as reasonably achievable.    FINDINGS:   12/11/2021 available for review.    The brain demonstrates unchanged 4.1 cm hemorrhage within the LEFT basal   ganglia with mild effacement of the LEFT lateral ventricle but no midline   shift.   No acute cerebral cortical infarct is seen.  No intracranial   hemorrhage is found.  No mass effect is found in the brain.    The ventricles, sulci and basal cisterns appear unremarkable.    The orbits are unremarkable.  The paranasal sinuses are clear.  The nasal   cavity appears intact.  The nasopharynx is symmetric.  The central skull   base, petrous temporal bones and calvarium remain intact.      IMPRESSION:   unchanged 4.1 cm hemorrhage within the LEFT basal ganglia   with mild effacement of the LEFT lateral ventricle but no midline shift.    ---End of Report ---    NITA MARTI MD; Attending Radiologist  This document has been electronically signed. Dec 12 2021  8:49AM    < end of copied text >      CT head (12/12/21):   unchanged 4.1 cm hemorrhage within the LEFT basal ganglia   with mild effacement of the LEFT lateral ventricle but no midline shift.      MR brain:   -4.8 cm left basal ganglia hemorrhage, unchanged. No underlying mass   identified.    -Small acute infarcts are noted involving the left superior and middle   frontal gyri.

## 2021-12-16 NOTE — DISCHARGE NOTE PROVIDER - NSDCFUADDINST_GEN_ALL_CORE_FT
NO heavy lifting, strenous activity, twisting, bending, driving, or working until cleared by your physician.  Please return to the emergency department if you develop changes in mental status, seizures, fainting, dizziness, changes in vision, lethargy, nausea, vomiting, chest pain, shortness of breathe or severe pain.

## 2021-12-16 NOTE — PROGRESS NOTE ADULT - SUBJECTIVE AND OBJECTIVE BOX
no new complaints     REVIEW OF SYSTEMS  Constitutional - No fever,  No fatigue  HEENT - No vertigo, No neck pain  Neurological - No headaches, No memory loss, No loss of strength, No numbness, No tremors  Skin - No rashes, No lesions   Musculoskeletal - No joint pain, No joint swelling, No muscle pain  Psychiatric - No depression, No anxiety    FUNCTIONAL PROGRESS  12/14 SLP/MBS  mild oropharyngeal dysphagia  Soft and bite sized textures with thin liquids    12/16 PT  bed mobility mod assist  transfers mod to max assist x 2    12/12 OT  bed mobility max assist x2  transfers max assist x 2    VITALS  T(C): 36.7 (12-16-21 @ 08:45), Max: 37.1 (12-15-21 @ 16:14)  HR: 83 (12-16-21 @ 09:48) (80 - 98)  BP: 123/85 (12-16-21 @ 09:48) (119/77 - 137/84)  RR: 18 (12-16-21 @ 09:48) (18 - 19)  SpO2: 95% (12-16-21 @ 09:48) (94% - 98%)  Wt(kg): --    MEDICATIONS   acetaminophen     Tablet .. 650 milliGRAM(s) every 6 hours PRN  enoxaparin Injectable 40 milliGRAM(s) <User Schedule>  influenza   Vaccine 0.5 milliLiter(s) once  polyethylene glycol 3350 17 Gram(s) every 12 hours  senna 2 Tablet(s) at bedtime PRN      RECENT LABS - Reviewed                        15.5   9.31  )-----------( 302      ( 16 Dec 2021 06:15 )             47.8     12-16    138  |  103  |  18  ----------------------------<  112<H>  3.9   |  24  |  0.40<L>    Ca    9.1      16 Dec 2021 06:14  Phos  4.3     12-16  Mg     2.4     12-16    < from: MR Head w/wo IV Cont (12.15.21 @ 18:11) >    IMPRESSION:    -4.8 cm left basal ganglia hemorrhage, unchanged. No underlying mass   identified.    -Small acute infarcts are noted involving the left superior and middle   frontal gyri.    < end of copied text >    ------------------------------------------------------------------------------  PHYSICAL EXAM  Constitutional - NAD, Comfortable, in recliner   Chest - Breathing comfortably  Cardiovascular - S1S2   Abdomen - Soft   Extremities - No C/C/E, No calf tenderness   Neurologic Exam -          follows commands           Cognitive - Awake, Alert, AAO x3      Communication - +dysarthria        Motor - right hemiplegia RUE/hand 1/5, DF 2/5     Sensory -decreased on right      Psychiatric - Mood stable, Affect WNL  ----------------------------------------------------------------------------------------  ASSESSMENT/PLAN  56yFemale with functional deficits after left BG IPH with hemiplegia, dysarthria, dysphagia   on keppra for seizure prophylaxis   bowel regimen  Pain - Tylenol  DVT PPX - SCDs, lovenox   Rehab - Will continue to follow for ongoing rehab needs and recommendations.   continue bedside therapy  out of bed to chair   Recommend ACUTE inpatient rehabilitation for the functional deficits consisting of 3 hours of therapy/day & 24 hour RN/daily PMR physician for comorbid medical management. Patient will be able to tolerate 3 hours a day.

## 2021-12-17 VITALS
DIASTOLIC BLOOD PRESSURE: 80 MMHG | TEMPERATURE: 98 F | OXYGEN SATURATION: 95 % | RESPIRATION RATE: 18 BRPM | SYSTOLIC BLOOD PRESSURE: 127 MMHG | HEART RATE: 84 BPM

## 2021-12-17 LAB
ANION GAP SERPL CALC-SCNC: 13 MMOL/L — SIGNIFICANT CHANGE UP (ref 5–17)
BUN SERPL-MCNC: 18 MG/DL — SIGNIFICANT CHANGE UP (ref 7–23)
CALCIUM SERPL-MCNC: 9.4 MG/DL — SIGNIFICANT CHANGE UP (ref 8.4–10.5)
CHLORIDE SERPL-SCNC: 102 MMOL/L — SIGNIFICANT CHANGE UP (ref 96–108)
CO2 SERPL-SCNC: 22 MMOL/L — SIGNIFICANT CHANGE UP (ref 22–31)
CREAT SERPL-MCNC: 0.42 MG/DL — LOW (ref 0.5–1.3)
GLUCOSE SERPL-MCNC: 117 MG/DL — HIGH (ref 70–99)
HCT VFR BLD CALC: 48.1 % — HIGH (ref 34.5–45)
HGB BLD-MCNC: 15.8 G/DL — HIGH (ref 11.5–15.5)
MCHC RBC-ENTMCNC: 28.5 PG — SIGNIFICANT CHANGE UP (ref 27–34)
MCHC RBC-ENTMCNC: 32.8 GM/DL — SIGNIFICANT CHANGE UP (ref 32–36)
MCV RBC AUTO: 86.7 FL — SIGNIFICANT CHANGE UP (ref 80–100)
NRBC # BLD: 0 /100 WBCS — SIGNIFICANT CHANGE UP (ref 0–0)
PLATELET # BLD AUTO: 314 K/UL — SIGNIFICANT CHANGE UP (ref 150–400)
POTASSIUM SERPL-MCNC: 4 MMOL/L — SIGNIFICANT CHANGE UP (ref 3.5–5.3)
POTASSIUM SERPL-SCNC: 4 MMOL/L — SIGNIFICANT CHANGE UP (ref 3.5–5.3)
RBC # BLD: 5.55 M/UL — HIGH (ref 3.8–5.2)
RBC # FLD: 13.1 % — SIGNIFICANT CHANGE UP (ref 10.3–14.5)
SODIUM SERPL-SCNC: 137 MMOL/L — SIGNIFICANT CHANGE UP (ref 135–145)
WBC # BLD: 8.76 K/UL — SIGNIFICANT CHANGE UP (ref 3.8–10.5)
WBC # FLD AUTO: 8.76 K/UL — SIGNIFICANT CHANGE UP (ref 3.8–10.5)

## 2021-12-17 PROCEDURE — 85576 BLOOD PLATELET AGGREGATION: CPT

## 2021-12-17 PROCEDURE — 85025 COMPLETE CBC W/AUTO DIFF WBC: CPT

## 2021-12-17 PROCEDURE — 80053 COMPREHEN METABOLIC PANEL: CPT

## 2021-12-17 PROCEDURE — 85027 COMPLETE CBC AUTOMATED: CPT

## 2021-12-17 PROCEDURE — 36224 PLACE CATH CAROTD ART: CPT

## 2021-12-17 PROCEDURE — 97110 THERAPEUTIC EXERCISES: CPT

## 2021-12-17 PROCEDURE — 80061 LIPID PANEL: CPT

## 2021-12-17 PROCEDURE — C1887: CPT

## 2021-12-17 PROCEDURE — 84443 ASSAY THYROID STIM HORMONE: CPT

## 2021-12-17 PROCEDURE — 92610 EVALUATE SWALLOWING FUNCTION: CPT

## 2021-12-17 PROCEDURE — 97530 THERAPEUTIC ACTIVITIES: CPT

## 2021-12-17 PROCEDURE — 92611 MOTION FLUOROSCOPY/SWALLOW: CPT

## 2021-12-17 PROCEDURE — 86900 BLOOD TYPING SEROLOGIC ABO: CPT

## 2021-12-17 PROCEDURE — 85610 PROTHROMBIN TIME: CPT

## 2021-12-17 PROCEDURE — 36415 COLL VENOUS BLD VENIPUNCTURE: CPT

## 2021-12-17 PROCEDURE — C1760: CPT

## 2021-12-17 PROCEDURE — C1769: CPT

## 2021-12-17 PROCEDURE — 36226 PLACE CATH VERTEBRAL ART: CPT

## 2021-12-17 PROCEDURE — 83036 HEMOGLOBIN GLYCOSYLATED A1C: CPT

## 2021-12-17 PROCEDURE — U0005: CPT

## 2021-12-17 PROCEDURE — 97760 ORTHOTIC MGMT&TRAING 1ST ENC: CPT

## 2021-12-17 PROCEDURE — U0003: CPT

## 2021-12-17 PROCEDURE — 84436 ASSAY OF TOTAL THYROXINE: CPT

## 2021-12-17 PROCEDURE — C8929: CPT

## 2021-12-17 PROCEDURE — C1894: CPT

## 2021-12-17 PROCEDURE — 84100 ASSAY OF PHOSPHORUS: CPT

## 2021-12-17 PROCEDURE — 84484 ASSAY OF TROPONIN QUANT: CPT

## 2021-12-17 PROCEDURE — 84702 CHORIONIC GONADOTROPIN TEST: CPT

## 2021-12-17 PROCEDURE — 85520 HEPARIN ASSAY: CPT

## 2021-12-17 PROCEDURE — 36227 PLACE CATH XTRNL CAROTID: CPT

## 2021-12-17 PROCEDURE — 70553 MRI BRAIN STEM W/O & W/DYE: CPT

## 2021-12-17 PROCEDURE — 86901 BLOOD TYPING SEROLOGIC RH(D): CPT

## 2021-12-17 PROCEDURE — 85730 THROMBOPLASTIN TIME PARTIAL: CPT

## 2021-12-17 PROCEDURE — 83735 ASSAY OF MAGNESIUM: CPT

## 2021-12-17 PROCEDURE — 99285 EMERGENCY DEPT VISIT HI MDM: CPT | Mod: 25

## 2021-12-17 PROCEDURE — 93005 ELECTROCARDIOGRAM TRACING: CPT

## 2021-12-17 PROCEDURE — 82962 GLUCOSE BLOOD TEST: CPT

## 2021-12-17 PROCEDURE — 86850 RBC ANTIBODY SCREEN: CPT

## 2021-12-17 PROCEDURE — 93970 EXTREMITY STUDY: CPT

## 2021-12-17 PROCEDURE — 70450 CT HEAD/BRAIN W/O DYE: CPT

## 2021-12-17 PROCEDURE — 80048 BASIC METABOLIC PNL TOTAL CA: CPT

## 2021-12-17 PROCEDURE — 97166 OT EVAL MOD COMPLEX 45 MIN: CPT

## 2021-12-17 PROCEDURE — 97163 PT EVAL HIGH COMPLEX 45 MIN: CPT

## 2021-12-17 PROCEDURE — 85396 CLOTTING ASSAY WHOLE BLOOD: CPT

## 2021-12-17 PROCEDURE — 99232 SBSQ HOSP IP/OBS MODERATE 35: CPT

## 2021-12-17 PROCEDURE — 74230 X-RAY XM SWLNG FUNCJ C+: CPT

## 2021-12-17 PROCEDURE — 71045 X-RAY EXAM CHEST 1 VIEW: CPT

## 2021-12-17 PROCEDURE — 80307 DRUG TEST PRSMV CHEM ANLYZR: CPT

## 2021-12-17 PROCEDURE — 92523 SPEECH SOUND LANG COMPREHEN: CPT

## 2021-12-17 RX ORDER — SENNA PLUS 8.6 MG/1
2 TABLET ORAL
Qty: 0 | Refills: 0 | DISCHARGE
Start: 2021-12-17

## 2021-12-17 RX ORDER — ACETAMINOPHEN 500 MG
2 TABLET ORAL
Qty: 0 | Refills: 0 | DISCHARGE
Start: 2021-12-17

## 2021-12-17 RX ORDER — POLYETHYLENE GLYCOL 3350 17 G/17G
17 POWDER, FOR SOLUTION ORAL
Qty: 0 | Refills: 0 | DISCHARGE
Start: 2021-12-17

## 2021-12-17 RX ORDER — ENOXAPARIN SODIUM 100 MG/ML
40 INJECTION SUBCUTANEOUS
Qty: 0 | Refills: 0 | DISCHARGE
Start: 2021-12-17

## 2021-12-17 NOTE — PROGRESS NOTE ADULT - SUBJECTIVE AND OBJECTIVE BOX
SUBJECTIVE: Patient seen and examined at bedside. Denies any complaints at this time.     OVERNIGHT EVENTS: none     Vital Signs Last 24 Hrs  T(C): 36.6 (17 Dec 2021 07:00), Max: 36.9 (16 Dec 2021 16:46)  T(F): 97.8 (17 Dec 2021 07:00), Max: 98.5 (16 Dec 2021 16:46)  HR: 84 (17 Dec 2021 07:00) (80 - 87)  BP: 127/80 (17 Dec 2021 07:00) (123/86 - 133/84)  BP(mean): --  RR: 18 (17 Dec 2021 07:00) (18 - 20)  SpO2: 95% (17 Dec 2021 07:00) (94% - 96%)    PHYSICAL EXAM:    General: No Acute Distress     Neurological:  AAOx3. EOMI. Mild Rt facial land exp aphasia. RUE 1/5, RLE 3/5, Lt side 5/5. Decrease sensory rt side.    Pulmonary: Clear to Auscultation, No Rales, No Rhonchi, No Wheezes     Cardiovascular: S1, S2, Regular Rate and Rhythm     Gastrointestinal: Soft, Nontender, Nondistended     Incision: c/d/i     LABS:                        15.5   9.31  )-----------( 302      ( 16 Dec 2021 06:15 )             47.8    12-16    138  |  103  |  18  ----------------------------<  112<H>  3.9   |  24  |  0.40<L>    Ca    9.1      16 Dec 2021 06:14  Phos  4.3     12-16  Mg     2.4     12-16          12-15 @ 07:01  -  12-16 @ 07:00  --------------------------------------------------------  IN: 640 mL / OUT: 550 mL / NET: 90 mL    12-16 @ 07:01  -  12-16 @ 13:14  --------------------------------------------------------  IN: 360 mL / OUT: 0 mL / NET: 360 mL    MEDICATIONS  (STANDING):  enoxaparin Injectable 40 milliGRAM(s) SubCutaneous <User Schedule>  influenza   Vaccine 0.5 milliLiter(s) IntraMuscular once  polyethylene glycol 3350 17 Gram(s) Oral every 12 hours    MEDICATIONS  (PRN):  acetaminophen     Tablet .. 650 milliGRAM(s) Oral every 6 hours PRN Temp greater or equal to 38C (100.4F), Mild Pain (1 - 3)  senna 2 Tablet(s) Oral at bedtime PRN Constipation        DIET: soft diet     IMAGING: < from: MR Head w/wo IV Cont (12.15.21 @ 18:11) >  IMPRESSION:    -4.8 cm left basal ganglia hemorrhage, unchanged. No underlying mass   identified.    -Small acute infarcts are noted involving the left superior and middle   frontal gyri.    --- End of Report ---    < end of copied text >

## 2021-12-17 NOTE — PROGRESS NOTE ADULT - REASON FOR ADMISSION
Intraparenchymal hemorrhage

## 2021-12-17 NOTE — PROGRESS NOTE ADULT - PROVIDER SPECIALTY LIST ADULT
NSICU
NSICU
Neurosurgery
Rehab Medicine
Neurology
Neurosurgery
NSICU
Neurosurgery
NSICU
Neurosurgery
NSICU
NSICU

## 2021-12-17 NOTE — PROGRESS NOTE ADULT - ASSESSMENT
55F w/ no sig pmh, not on AC/AP found down by daughter w/ R weakness and slurred speech after hearing a sound found to have a L 4.5 x 2.4 x 3.6 cm BG IPH with no shift, CTA neg. 10hr repeat on Left done at [facility] stable. LKN 10:30 pm.    PROCEDURE: angio  on 12/11 negative   PAD#6    PLAN:  -Neuro: neuro stable  -Continue neuro and vitals q 4 hours  -Tylenol for pain control  -Neurology following. Recommennd 1)  Repeat MRI head w and w/o contrast in 6-8 weeks 2) BP control goal <160/90, or as per NSICU 3) Repeat CTH and MRI have been stable    -Encouraged incentive spirometry   -Leukocytosis resolved  -Soft diet  -DVT ppx: venodynes and sql  -PT/OT/PMR: AR ; dc to rehab later today     Above discussed  with Dr. Flores  Spectra #[id number]

## 2022-01-11 ENCOUNTER — APPOINTMENT (OUTPATIENT)
Dept: NEUROSURGERY | Facility: CLINIC | Age: 57
End: 2022-01-11

## 2022-01-28 ENCOUNTER — RESULT REVIEW (OUTPATIENT)
Age: 57
End: 2022-01-28

## 2022-02-13 ENCOUNTER — FORM ENCOUNTER (OUTPATIENT)
Age: 57
End: 2022-02-13

## 2022-02-16 ENCOUNTER — APPOINTMENT (OUTPATIENT)
Dept: CARE COORDINATION | Facility: HOME HEALTH | Age: 57
End: 2022-02-16
Payer: MEDICAID

## 2022-02-16 DIAGNOSIS — Z82.49 FAMILY HISTORY OF ISCHEMIC HEART DISEASE AND OTHER DISEASES OF THE CIRCULATORY SYSTEM: ICD-10-CM

## 2022-02-16 DIAGNOSIS — M79.641 PAIN IN RIGHT HAND: ICD-10-CM

## 2022-02-16 DIAGNOSIS — R53.1 WEAKNESS: ICD-10-CM

## 2022-02-16 DIAGNOSIS — Z83.3 FAMILY HISTORY OF DIABETES MELLITUS: ICD-10-CM

## 2022-02-16 PROCEDURE — 99342 HOME/RES VST NEW LOW MDM 30: CPT | Mod: 95

## 2022-02-16 RX ORDER — ACETAMINOPHEN 500 MG/1
500 TABLET, COATED ORAL
Refills: 0 | Status: ACTIVE | COMMUNITY
Start: 2022-02-16

## 2022-02-16 RX ORDER — MELATONIN 5 MG
5 CAPSULE ORAL
Refills: 0 | Status: ACTIVE | COMMUNITY
Start: 2022-02-16

## 2022-02-18 ENCOUNTER — APPOINTMENT (OUTPATIENT)
Dept: NEUROLOGY | Facility: CLINIC | Age: 57
End: 2022-02-18
Payer: MEDICAID

## 2022-02-18 VITALS
DIASTOLIC BLOOD PRESSURE: 70 MMHG | HEART RATE: 88 BPM | SYSTOLIC BLOOD PRESSURE: 113 MMHG | WEIGHT: 167 LBS | BODY MASS INDEX: 31.53 KG/M2 | HEIGHT: 61 IN

## 2022-02-18 DIAGNOSIS — M79.2 NEURALGIA AND NEURITIS, UNSPECIFIED: ICD-10-CM

## 2022-02-18 PROCEDURE — 99215 OFFICE O/P EST HI 40 MIN: CPT

## 2022-02-18 RX ORDER — GABAPENTIN 100 MG/1
100 CAPSULE ORAL 3 TIMES DAILY
Qty: 90 | Refills: 2 | Status: DISCONTINUED | COMMUNITY
Start: 2022-02-18 | End: 2022-02-18

## 2022-02-22 RX ORDER — GABAPENTIN 100 MG/1
100 CAPSULE ORAL TWICE DAILY
Refills: 0 | Status: DISCONTINUED | COMMUNITY
Start: 2022-02-16 | End: 2022-02-22

## 2022-02-23 ENCOUNTER — APPOINTMENT (OUTPATIENT)
Dept: NEUROSURGERY | Facility: CLINIC | Age: 57
End: 2022-02-23
Payer: MEDICAID

## 2022-02-23 PROCEDURE — 99441: CPT | Mod: 95

## 2022-02-24 ENCOUNTER — NON-APPOINTMENT (OUTPATIENT)
Age: 57
End: 2022-02-24

## 2022-03-04 ENCOUNTER — NON-APPOINTMENT (OUTPATIENT)
Age: 57
End: 2022-03-04

## 2022-03-15 ENCOUNTER — NON-APPOINTMENT (OUTPATIENT)
Age: 57
End: 2022-03-15

## 2022-03-25 ENCOUNTER — APPOINTMENT (OUTPATIENT)
Dept: MRI IMAGING | Facility: CLINIC | Age: 57
End: 2022-03-25
Payer: MEDICAID

## 2022-03-25 ENCOUNTER — OUTPATIENT (OUTPATIENT)
Dept: OUTPATIENT SERVICES | Facility: HOSPITAL | Age: 57
LOS: 1 days | End: 2022-03-25
Payer: MEDICAID

## 2022-03-25 ENCOUNTER — APPOINTMENT (OUTPATIENT)
Dept: NEUROLOGY | Facility: CLINIC | Age: 57
End: 2022-03-25
Payer: MEDICAID

## 2022-03-25 DIAGNOSIS — I63.9 CEREBRAL INFARCTION, UNSPECIFIED: ICD-10-CM

## 2022-03-25 PROCEDURE — 70553 MRI BRAIN STEM W/O & W/DYE: CPT

## 2022-03-25 PROCEDURE — A9585: CPT

## 2022-03-25 PROCEDURE — 70544 MR ANGIOGRAPHY HEAD W/O DYE: CPT

## 2022-03-25 PROCEDURE — 70553 MRI BRAIN STEM W/O & W/DYE: CPT | Mod: 26

## 2022-03-30 ENCOUNTER — APPOINTMENT (OUTPATIENT)
Dept: NEUROSURGERY | Facility: CLINIC | Age: 57
End: 2022-03-30
Payer: MEDICAID

## 2022-03-30 PROCEDURE — 99441: CPT

## 2022-04-01 ENCOUNTER — APPOINTMENT (OUTPATIENT)
Dept: NEUROLOGY | Facility: CLINIC | Age: 57
End: 2022-04-01
Payer: MEDICAID

## 2022-04-01 VITALS
BODY MASS INDEX: 31.15 KG/M2 | SYSTOLIC BLOOD PRESSURE: 142 MMHG | HEIGHT: 61 IN | HEART RATE: 81 BPM | WEIGHT: 165 LBS | DIASTOLIC BLOOD PRESSURE: 86 MMHG

## 2022-04-01 DIAGNOSIS — Z86.79 PERSONAL HISTORY OF OTHER DISEASES OF THE CIRCULATORY SYSTEM: ICD-10-CM

## 2022-04-01 PROCEDURE — 99214 OFFICE O/P EST MOD 30 MIN: CPT

## 2022-04-01 RX ORDER — LEVETIRACETAM 250 MG/1
250 TABLET, FILM COATED ORAL TWICE DAILY
Refills: 0 | Status: DISCONTINUED | COMMUNITY
Start: 2022-02-16 | End: 2022-04-01

## 2022-04-08 ENCOUNTER — APPOINTMENT (OUTPATIENT)
Dept: CARDIOLOGY | Facility: CLINIC | Age: 57
End: 2022-04-08
Payer: MEDICAID

## 2022-04-08 ENCOUNTER — NON-APPOINTMENT (OUTPATIENT)
Age: 57
End: 2022-04-08

## 2022-04-08 VITALS
SYSTOLIC BLOOD PRESSURE: 133 MMHG | BODY MASS INDEX: 31.15 KG/M2 | HEIGHT: 61 IN | DIASTOLIC BLOOD PRESSURE: 84 MMHG | OXYGEN SATURATION: 96 % | HEART RATE: 91 BPM | WEIGHT: 165 LBS

## 2022-04-08 PROCEDURE — 99204 OFFICE O/P NEW MOD 45 MIN: CPT

## 2022-04-08 PROCEDURE — 93000 ELECTROCARDIOGRAM COMPLETE: CPT

## 2022-04-08 RX ORDER — LISINOPRIL 5 MG/1
5 TABLET ORAL DAILY
Refills: 0 | Status: DISCONTINUED | COMMUNITY
Start: 2022-02-16 | End: 2022-04-08

## 2022-04-29 ENCOUNTER — NON-APPOINTMENT (OUTPATIENT)
Age: 57
End: 2022-04-29

## 2022-04-29 ENCOUNTER — APPOINTMENT (OUTPATIENT)
Dept: INTERNAL MEDICINE | Facility: CLINIC | Age: 57
End: 2022-04-29
Payer: MEDICAID

## 2022-04-29 VITALS
WEIGHT: 160 LBS | BODY MASS INDEX: 30.21 KG/M2 | HEIGHT: 61 IN | DIASTOLIC BLOOD PRESSURE: 85 MMHG | TEMPERATURE: 98.6 F | OXYGEN SATURATION: 91 % | SYSTOLIC BLOOD PRESSURE: 126 MMHG | HEART RATE: 101 BPM

## 2022-04-29 DIAGNOSIS — Z23 ENCOUNTER FOR IMMUNIZATION: ICD-10-CM

## 2022-04-29 DIAGNOSIS — K21.9 GASTRO-ESOPHAGEAL REFLUX DISEASE W/OUT ESOPHAGITIS: ICD-10-CM

## 2022-04-29 DIAGNOSIS — Z00.00 ENCOUNTER FOR GENERAL ADULT MEDICAL EXAMINATION W/OUT ABNORMAL FINDINGS: ICD-10-CM

## 2022-04-29 PROCEDURE — 99396 PREV VISIT EST AGE 40-64: CPT | Mod: 25

## 2022-04-29 PROCEDURE — 99386 PREV VISIT NEW AGE 40-64: CPT | Mod: 25

## 2022-04-29 RX ORDER — DOCUSATE SODIUM 100 MG/1
100 CAPSULE ORAL
Refills: 0 | Status: DISCONTINUED | COMMUNITY
Start: 2022-02-16 | End: 2022-04-29

## 2022-04-29 RX ORDER — GABAPENTIN 100 MG/1
100 CAPSULE ORAL 3 TIMES DAILY
Qty: 90 | Refills: 2 | Status: DISCONTINUED | COMMUNITY
Start: 2022-02-18 | End: 2022-04-29

## 2022-04-29 RX ORDER — OXYCODONE 5 MG/1
5 TABLET ORAL
Refills: 0 | Status: DISCONTINUED | COMMUNITY
Start: 2022-02-16 | End: 2022-04-29

## 2022-04-29 RX ORDER — GABAPENTIN 100 MG/1
100 CAPSULE ORAL 3 TIMES DAILY
Qty: 90 | Refills: 2 | Status: DISCONTINUED | COMMUNITY
Start: 2022-02-19 | End: 2022-04-29

## 2022-05-02 LAB
25(OH)D3 SERPL-MCNC: 25.1 NG/ML
ALBUMIN SERPL ELPH-MCNC: 4.4 G/DL
ALP BLD-CCNC: 130 U/L
ALT SERPL-CCNC: 19 U/L
ANION GAP SERPL CALC-SCNC: 12 MMOL/L
AST SERPL-CCNC: 19 U/L
BASOPHILS # BLD AUTO: 0.03 K/UL
BASOPHILS NFR BLD AUTO: 0.4 %
BILIRUB SERPL-MCNC: 0.6 MG/DL
BUN SERPL-MCNC: 15 MG/DL
CALCIUM SERPL-MCNC: 9.9 MG/DL
CHLORIDE SERPL-SCNC: 102 MMOL/L
CHOLEST SERPL-MCNC: 205 MG/DL
CO2 SERPL-SCNC: 24 MMOL/L
CREAT SERPL-MCNC: 0.48 MG/DL
EGFR: 112 ML/MIN/1.73M2
EOSINOPHIL # BLD AUTO: 0.08 K/UL
EOSINOPHIL NFR BLD AUTO: 1.1 %
ESTIMATED AVERAGE GLUCOSE: 120 MG/DL
GGT SERPL-CCNC: 87 U/L
GLUCOSE SERPL-MCNC: 101 MG/DL
HBA1C MFR BLD HPLC: 5.8 %
HCT VFR BLD CALC: 47.4 %
HDLC SERPL-MCNC: 49 MG/DL
HGB BLD-MCNC: 15.6 G/DL
IMM GRANULOCYTES NFR BLD AUTO: 0.5 %
LDLC SERPL CALC-MCNC: 127 MG/DL
LYMPHOCYTES # BLD AUTO: 2.22 K/UL
LYMPHOCYTES NFR BLD AUTO: 29.4 %
MAN DIFF?: NORMAL
MCHC RBC-ENTMCNC: 28.7 PG
MCHC RBC-ENTMCNC: 32.9 GM/DL
MCV RBC AUTO: 87.3 FL
MONOCYTES # BLD AUTO: 0.38 K/UL
MONOCYTES NFR BLD AUTO: 5 %
NEUTROPHILS # BLD AUTO: 4.81 K/UL
NEUTROPHILS NFR BLD AUTO: 63.6 %
NONHDLC SERPL-MCNC: 156 MG/DL
PLATELET # BLD AUTO: 362 K/UL
POTASSIUM SERPL-SCNC: 4.5 MMOL/L
PROT SERPL-MCNC: 7.7 G/DL
RBC # BLD: 5.43 M/UL
RBC # FLD: 13.5 %
SODIUM SERPL-SCNC: 138 MMOL/L
TRIGL SERPL-MCNC: 142 MG/DL
TSH SERPL-ACNC: 1.04 UIU/ML
WBC # FLD AUTO: 7.56 K/UL

## 2022-05-06 ENCOUNTER — APPOINTMENT (OUTPATIENT)
Dept: NEUROLOGY | Facility: CLINIC | Age: 57
End: 2022-05-06
Payer: MEDICAID

## 2022-05-06 PROCEDURE — 93880 EXTRACRANIAL BILAT STUDY: CPT

## 2022-05-06 PROCEDURE — 93892 TCD EMBOLI DETECT W/O INJ: CPT

## 2022-05-06 PROCEDURE — 93886 INTRACRANIAL COMPLETE STUDY: CPT

## 2022-06-10 ENCOUNTER — APPOINTMENT (OUTPATIENT)
Dept: CARDIOLOGY | Facility: CLINIC | Age: 57
End: 2022-06-10

## 2022-06-22 ENCOUNTER — APPOINTMENT (OUTPATIENT)
Dept: NEUROLOGY | Facility: CLINIC | Age: 57
End: 2022-06-22
Payer: MEDICAID

## 2022-06-22 PROCEDURE — 99213 OFFICE O/P EST LOW 20 MIN: CPT | Mod: 95

## 2022-07-10 ENCOUNTER — NON-APPOINTMENT (OUTPATIENT)
Age: 57
End: 2022-07-10

## 2022-07-11 ENCOUNTER — APPOINTMENT (OUTPATIENT)
Dept: INTERNAL MEDICINE | Facility: CLINIC | Age: 57
End: 2022-07-11

## 2022-07-11 VITALS
TEMPERATURE: 97.8 F | OXYGEN SATURATION: 97 % | BODY MASS INDEX: 30.96 KG/M2 | WEIGHT: 164 LBS | DIASTOLIC BLOOD PRESSURE: 82 MMHG | HEIGHT: 61 IN | SYSTOLIC BLOOD PRESSURE: 128 MMHG | HEART RATE: 76 BPM

## 2022-07-11 DIAGNOSIS — M79.674 PAIN IN RIGHT TOE(S): ICD-10-CM

## 2022-07-11 PROCEDURE — 36415 COLL VENOUS BLD VENIPUNCTURE: CPT

## 2022-07-11 PROCEDURE — 99213 OFFICE O/P EST LOW 20 MIN: CPT | Mod: 25

## 2022-07-12 ENCOUNTER — NON-APPOINTMENT (OUTPATIENT)
Age: 57
End: 2022-07-12

## 2022-07-12 LAB
ALBUMIN SERPL ELPH-MCNC: 4.4 G/DL
ALP BLD-CCNC: 133 U/L
ALT SERPL-CCNC: 18 U/L
ANION GAP SERPL CALC-SCNC: 11 MMOL/L
AST SERPL-CCNC: 24 U/L
BASOPHILS # BLD AUTO: 0.04 K/UL
BASOPHILS NFR BLD AUTO: 0.7 %
BILIRUB SERPL-MCNC: 0.8 MG/DL
BUN SERPL-MCNC: 13 MG/DL
CALCIUM SERPL-MCNC: 9.4 MG/DL
CHLORIDE SERPL-SCNC: 105 MMOL/L
CHOLEST SERPL-MCNC: 120 MG/DL
CO2 SERPL-SCNC: 26 MMOL/L
CREAT SERPL-MCNC: 0.46 MG/DL
EGFR: 113 ML/MIN/1.73M2
EOSINOPHIL # BLD AUTO: 0.12 K/UL
EOSINOPHIL NFR BLD AUTO: 2 %
GLUCOSE SERPL-MCNC: 105 MG/DL
HCT VFR BLD CALC: 45.9 %
HDLC SERPL-MCNC: 50 MG/DL
HGB BLD-MCNC: 14.7 G/DL
IMM GRANULOCYTES NFR BLD AUTO: 0.2 %
LDLC SERPL CALC-MCNC: 47 MG/DL
LYMPHOCYTES # BLD AUTO: 2.07 K/UL
LYMPHOCYTES NFR BLD AUTO: 34.8 %
MAN DIFF?: NORMAL
MCHC RBC-ENTMCNC: 28.7 PG
MCHC RBC-ENTMCNC: 32 GM/DL
MCV RBC AUTO: 89.6 FL
MONOCYTES # BLD AUTO: 0.38 K/UL
MONOCYTES NFR BLD AUTO: 6.4 %
NEUTROPHILS # BLD AUTO: 3.33 K/UL
NEUTROPHILS NFR BLD AUTO: 55.9 %
NONHDLC SERPL-MCNC: 70 MG/DL
PLATELET # BLD AUTO: 299 K/UL
POTASSIUM SERPL-SCNC: 3.8 MMOL/L
PROT SERPL-MCNC: 7.2 G/DL
RBC # BLD: 5.12 M/UL
RBC # FLD: 13.2 %
SODIUM SERPL-SCNC: 142 MMOL/L
TRIGL SERPL-MCNC: 119 MG/DL
WBC # FLD AUTO: 5.95 K/UL

## 2022-07-25 ENCOUNTER — NON-APPOINTMENT (OUTPATIENT)
Age: 57
End: 2022-07-25

## 2022-07-25 ENCOUNTER — APPOINTMENT (OUTPATIENT)
Dept: CARDIOLOGY | Facility: CLINIC | Age: 57
End: 2022-07-25

## 2022-07-25 VITALS
BODY MASS INDEX: 27.59 KG/M2 | RESPIRATION RATE: 16 BRPM | WEIGHT: 146 LBS | SYSTOLIC BLOOD PRESSURE: 117 MMHG | TEMPERATURE: 98.2 F | OXYGEN SATURATION: 97 % | HEART RATE: 79 BPM | DIASTOLIC BLOOD PRESSURE: 74 MMHG

## 2022-07-25 PROCEDURE — 93000 ELECTROCARDIOGRAM COMPLETE: CPT

## 2022-07-25 PROCEDURE — 99213 OFFICE O/P EST LOW 20 MIN: CPT | Mod: 25

## 2022-07-27 ENCOUNTER — APPOINTMENT (OUTPATIENT)
Dept: NEUROLOGY | Facility: CLINIC | Age: 57
End: 2022-07-27

## 2022-08-02 ENCOUNTER — NON-APPOINTMENT (OUTPATIENT)
Age: 57
End: 2022-08-02

## 2022-08-16 ENCOUNTER — APPOINTMENT (OUTPATIENT)
Dept: INTERNAL MEDICINE | Facility: CLINIC | Age: 57
End: 2022-08-16

## 2022-09-19 ENCOUNTER — APPOINTMENT (OUTPATIENT)
Dept: INTERNAL MEDICINE | Facility: CLINIC | Age: 57
End: 2022-09-19

## 2022-10-17 ENCOUNTER — APPOINTMENT (OUTPATIENT)
Dept: NEUROLOGY | Facility: CLINIC | Age: 57
End: 2022-10-17

## 2022-10-17 PROCEDURE — 99213 OFFICE O/P EST LOW 20 MIN: CPT | Mod: 95

## 2022-10-21 ENCOUNTER — APPOINTMENT (OUTPATIENT)
Dept: PAIN MANAGEMENT | Facility: CLINIC | Age: 57
End: 2022-10-21

## 2022-10-21 VITALS
BODY MASS INDEX: 24.92 KG/M2 | DIASTOLIC BLOOD PRESSURE: 83 MMHG | HEART RATE: 109 BPM | SYSTOLIC BLOOD PRESSURE: 146 MMHG | HEIGHT: 61 IN | WEIGHT: 132 LBS

## 2022-10-21 PROCEDURE — 99215 OFFICE O/P EST HI 40 MIN: CPT

## 2022-10-21 PROCEDURE — 99205 OFFICE O/P NEW HI 60 MIN: CPT

## 2022-10-24 ENCOUNTER — APPOINTMENT (OUTPATIENT)
Dept: RHEUMATOLOGY | Facility: CLINIC | Age: 57
End: 2022-10-24

## 2022-10-24 VITALS
HEART RATE: 86 BPM | BODY MASS INDEX: 25.11 KG/M2 | SYSTOLIC BLOOD PRESSURE: 134 MMHG | HEIGHT: 61 IN | DIASTOLIC BLOOD PRESSURE: 84 MMHG | WEIGHT: 133 LBS | OXYGEN SATURATION: 97 % | TEMPERATURE: 97.9 F

## 2022-10-24 VITALS
SYSTOLIC BLOOD PRESSURE: 115 MMHG | HEIGHT: 61 IN | RESPIRATION RATE: 18 BRPM | HEART RATE: 94 BPM | DIASTOLIC BLOOD PRESSURE: 79 MMHG | TEMPERATURE: 98 F | BODY MASS INDEX: 23.98 KG/M2 | WEIGHT: 127 LBS | OXYGEN SATURATION: 98 %

## 2022-10-24 DIAGNOSIS — M25.561 PAIN IN RIGHT KNEE: ICD-10-CM

## 2022-10-24 DIAGNOSIS — M79.604 PAIN IN RIGHT LEG: ICD-10-CM

## 2022-10-24 PROCEDURE — 99205 OFFICE O/P NEW HI 60 MIN: CPT

## 2022-10-25 ENCOUNTER — NON-APPOINTMENT (OUTPATIENT)
Age: 57
End: 2022-10-25

## 2022-11-04 ENCOUNTER — APPOINTMENT (OUTPATIENT)
Dept: MRI IMAGING | Facility: CLINIC | Age: 57
End: 2022-11-04

## 2022-11-14 ENCOUNTER — APPOINTMENT (OUTPATIENT)
Dept: INTERNAL MEDICINE | Facility: CLINIC | Age: 57
End: 2022-11-14

## 2022-12-09 ENCOUNTER — NON-APPOINTMENT (OUTPATIENT)
Age: 57
End: 2022-12-09

## 2022-12-09 ENCOUNTER — EMERGENCY (EMERGENCY)
Facility: HOSPITAL | Age: 57
LOS: 1 days | Discharge: HOME | End: 2022-12-09
Attending: EMERGENCY MEDICINE
Payer: MEDICAID

## 2022-12-09 VITALS
DIASTOLIC BLOOD PRESSURE: 80 MMHG | TEMPERATURE: 98 F | OXYGEN SATURATION: 97 % | RESPIRATION RATE: 18 BRPM | HEART RATE: 75 BPM | SYSTOLIC BLOOD PRESSURE: 124 MMHG

## 2022-12-09 VITALS
DIASTOLIC BLOOD PRESSURE: 67 MMHG | WEIGHT: 130.07 LBS | HEART RATE: 91 BPM | OXYGEN SATURATION: 98 % | SYSTOLIC BLOOD PRESSURE: 111 MMHG | RESPIRATION RATE: 22 BRPM | TEMPERATURE: 98 F | HEIGHT: 61 IN

## 2022-12-09 LAB
ALBUMIN SERPL ELPH-MCNC: 4.5 G/DL — SIGNIFICANT CHANGE UP (ref 3.3–5)
ALP SERPL-CCNC: 144 U/L — HIGH (ref 40–120)
ALT FLD-CCNC: 22 U/L — SIGNIFICANT CHANGE UP (ref 10–45)
ANION GAP SERPL CALC-SCNC: 11 MMOL/L — SIGNIFICANT CHANGE UP (ref 5–17)
APPEARANCE UR: ABNORMAL
AST SERPL-CCNC: 22 U/L — SIGNIFICANT CHANGE UP (ref 10–40)
BACTERIA # UR AUTO: ABNORMAL
BASE EXCESS BLDV CALC-SCNC: 2.2 MMOL/L — SIGNIFICANT CHANGE UP (ref -2–3)
BASOPHILS # BLD AUTO: 0.03 K/UL — SIGNIFICANT CHANGE UP (ref 0–0.2)
BASOPHILS NFR BLD AUTO: 0.3 % — SIGNIFICANT CHANGE UP (ref 0–2)
BILIRUB SERPL-MCNC: 0.5 MG/DL — SIGNIFICANT CHANGE UP (ref 0.2–1.2)
BILIRUB UR-MCNC: NEGATIVE — SIGNIFICANT CHANGE UP
BUN SERPL-MCNC: 13 MG/DL — SIGNIFICANT CHANGE UP (ref 7–23)
CA-I SERPL-SCNC: 1.27 MMOL/L — SIGNIFICANT CHANGE UP (ref 1.15–1.33)
CALCIUM SERPL-MCNC: 9.7 MG/DL — SIGNIFICANT CHANGE UP (ref 8.4–10.5)
CHLORIDE BLDV-SCNC: 102 MMOL/L — SIGNIFICANT CHANGE UP (ref 96–108)
CHLORIDE SERPL-SCNC: 103 MMOL/L — SIGNIFICANT CHANGE UP (ref 96–108)
CO2 BLDV-SCNC: 32 MMOL/L — HIGH (ref 22–26)
CO2 SERPL-SCNC: 25 MMOL/L — SIGNIFICANT CHANGE UP (ref 22–31)
COLOR SPEC: YELLOW — SIGNIFICANT CHANGE UP
CREAT SERPL-MCNC: 0.46 MG/DL — LOW (ref 0.5–1.3)
DIFF PNL FLD: ABNORMAL
EGFR: 112 ML/MIN/1.73M2 — SIGNIFICANT CHANGE UP
EOSINOPHIL # BLD AUTO: 0.07 K/UL — SIGNIFICANT CHANGE UP (ref 0–0.5)
EOSINOPHIL NFR BLD AUTO: 0.6 % — SIGNIFICANT CHANGE UP (ref 0–6)
EPI CELLS # UR: 4 /HPF — SIGNIFICANT CHANGE UP
FLUAV AG NPH QL: SIGNIFICANT CHANGE UP
FLUBV AG NPH QL: SIGNIFICANT CHANGE UP
GAS PNL BLDV: 135 MMOL/L — LOW (ref 136–145)
GAS PNL BLDV: SIGNIFICANT CHANGE UP
GLUCOSE BLDV-MCNC: 112 MG/DL — HIGH (ref 70–99)
GLUCOSE SERPL-MCNC: 109 MG/DL — HIGH (ref 70–99)
GLUCOSE UR QL: NEGATIVE — SIGNIFICANT CHANGE UP
HCO3 BLDV-SCNC: 30 MMOL/L — HIGH (ref 22–29)
HCT VFR BLD CALC: 45.8 % — HIGH (ref 34.5–45)
HCT VFR BLDA CALC: 44 % — SIGNIFICANT CHANGE UP (ref 34.5–46.5)
HGB BLD CALC-MCNC: 14.7 G/DL — SIGNIFICANT CHANGE UP (ref 11.7–16.1)
HGB BLD-MCNC: 14.8 G/DL — SIGNIFICANT CHANGE UP (ref 11.5–15.5)
HYALINE CASTS # UR AUTO: 11 /LPF — HIGH (ref 0–2)
IMM GRANULOCYTES NFR BLD AUTO: 0.5 % — SIGNIFICANT CHANGE UP (ref 0–0.9)
KETONES UR-MCNC: NEGATIVE — SIGNIFICANT CHANGE UP
LACTATE BLDV-MCNC: 1.7 MMOL/L — SIGNIFICANT CHANGE UP (ref 0.5–2)
LEUKOCYTE ESTERASE UR-ACNC: ABNORMAL
LYMPHOCYTES # BLD AUTO: 1.47 K/UL — SIGNIFICANT CHANGE UP (ref 1–3.3)
LYMPHOCYTES # BLD AUTO: 12.4 % — LOW (ref 13–44)
MCHC RBC-ENTMCNC: 28.7 PG — SIGNIFICANT CHANGE UP (ref 27–34)
MCHC RBC-ENTMCNC: 32.3 GM/DL — SIGNIFICANT CHANGE UP (ref 32–36)
MCV RBC AUTO: 88.8 FL — SIGNIFICANT CHANGE UP (ref 80–100)
MONOCYTES # BLD AUTO: 0.59 K/UL — SIGNIFICANT CHANGE UP (ref 0–0.9)
MONOCYTES NFR BLD AUTO: 5 % — SIGNIFICANT CHANGE UP (ref 2–14)
NEUTROPHILS # BLD AUTO: 9.67 K/UL — HIGH (ref 1.8–7.4)
NEUTROPHILS NFR BLD AUTO: 81.2 % — HIGH (ref 43–77)
NITRITE UR-MCNC: NEGATIVE — SIGNIFICANT CHANGE UP
NRBC # BLD: 0 /100 WBCS — SIGNIFICANT CHANGE UP (ref 0–0)
NT-PROBNP SERPL-SCNC: 44 PG/ML — SIGNIFICANT CHANGE UP (ref 0–300)
PCO2 BLDV: 58 MMHG — HIGH (ref 39–42)
PH BLDV: 7.32 — SIGNIFICANT CHANGE UP (ref 7.32–7.43)
PH UR: 6 — SIGNIFICANT CHANGE UP (ref 5–8)
PLATELET # BLD AUTO: 283 K/UL — SIGNIFICANT CHANGE UP (ref 150–400)
PO2 BLDV: 21 MMHG — LOW (ref 25–45)
POTASSIUM BLDV-SCNC: 3.6 MMOL/L — SIGNIFICANT CHANGE UP (ref 3.5–5.1)
POTASSIUM SERPL-MCNC: 3.7 MMOL/L — SIGNIFICANT CHANGE UP (ref 3.5–5.3)
POTASSIUM SERPL-SCNC: 3.7 MMOL/L — SIGNIFICANT CHANGE UP (ref 3.5–5.3)
PROT SERPL-MCNC: 7.9 G/DL — SIGNIFICANT CHANGE UP (ref 6–8.3)
PROT UR-MCNC: ABNORMAL
RBC # BLD: 5.16 M/UL — SIGNIFICANT CHANGE UP (ref 3.8–5.2)
RBC # FLD: 12.8 % — SIGNIFICANT CHANGE UP (ref 10.3–14.5)
RBC CASTS # UR COMP ASSIST: 7 /HPF — HIGH (ref 0–4)
RSV RNA NPH QL NAA+NON-PROBE: SIGNIFICANT CHANGE UP
SAO2 % BLDV: 27.6 % — LOW (ref 67–88)
SARS-COV-2 RNA SPEC QL NAA+PROBE: SIGNIFICANT CHANGE UP
SODIUM SERPL-SCNC: 139 MMOL/L — SIGNIFICANT CHANGE UP (ref 135–145)
SP GR SPEC: 1.02 — SIGNIFICANT CHANGE UP (ref 1.01–1.02)
TROPONIN T, HIGH SENSITIVITY RESULT: 7 NG/L — SIGNIFICANT CHANGE UP (ref 0–51)
UROBILINOGEN FLD QL: NEGATIVE — SIGNIFICANT CHANGE UP
WBC # BLD: 11.89 K/UL — HIGH (ref 3.8–10.5)
WBC # FLD AUTO: 11.89 K/UL — HIGH (ref 3.8–10.5)
WBC UR QL: 58 /HPF — HIGH (ref 0–5)

## 2022-12-09 PROCEDURE — 83605 ASSAY OF LACTIC ACID: CPT

## 2022-12-09 PROCEDURE — 99285 EMERGENCY DEPT VISIT HI MDM: CPT | Mod: 25

## 2022-12-09 PROCEDURE — 87637 SARSCOV2&INF A&B&RSV AMP PRB: CPT

## 2022-12-09 PROCEDURE — 99284 EMERGENCY DEPT VISIT MOD MDM: CPT

## 2022-12-09 PROCEDURE — 85018 HEMOGLOBIN: CPT

## 2022-12-09 PROCEDURE — 85025 COMPLETE CBC W/AUTO DIFF WBC: CPT

## 2022-12-09 PROCEDURE — 84132 ASSAY OF SERUM POTASSIUM: CPT

## 2022-12-09 PROCEDURE — 82803 BLOOD GASES ANY COMBINATION: CPT

## 2022-12-09 PROCEDURE — 84295 ASSAY OF SERUM SODIUM: CPT

## 2022-12-09 PROCEDURE — 93010 ELECTROCARDIOGRAM REPORT: CPT

## 2022-12-09 PROCEDURE — 93005 ELECTROCARDIOGRAM TRACING: CPT

## 2022-12-09 PROCEDURE — 82435 ASSAY OF BLOOD CHLORIDE: CPT

## 2022-12-09 PROCEDURE — 85014 HEMATOCRIT: CPT

## 2022-12-09 PROCEDURE — 71045 X-RAY EXAM CHEST 1 VIEW: CPT | Mod: 26

## 2022-12-09 PROCEDURE — 82947 ASSAY GLUCOSE BLOOD QUANT: CPT

## 2022-12-09 PROCEDURE — 84484 ASSAY OF TROPONIN QUANT: CPT

## 2022-12-09 PROCEDURE — 81001 URINALYSIS AUTO W/SCOPE: CPT

## 2022-12-09 PROCEDURE — 82330 ASSAY OF CALCIUM: CPT

## 2022-12-09 PROCEDURE — 83880 ASSAY OF NATRIURETIC PEPTIDE: CPT

## 2022-12-09 PROCEDURE — 80053 COMPREHEN METABOLIC PANEL: CPT

## 2022-12-09 PROCEDURE — 71045 X-RAY EXAM CHEST 1 VIEW: CPT

## 2022-12-09 NOTE — ED PROVIDER NOTE - CLINICAL SUMMARY MEDICAL DECISION MAKING FREE TEXT BOX
57-year-old woman with a history of CVA presenting for evaluation of lower abdominal discomfort, diaphoresis and low blood pressure.  The patient states that she had lower abdominal pain when she was walking her dog, she went to use the bathroom and had some loose stools.  When she was straining on the toilet she developed diaphoresis and near syncope.  She reportedly had low blood pressure of 70 systolic.  Currently her symptoms have all resolved and she feels fine.  Specifically she has no abdominal pain no chest pain or palpitations.    Vital signs normal.  Normal cardiopulmonary examination.  No obvious murmur.  Abdomen soft nondistended nontender.  Moves all extremities spontaneously.  Blood pressure has been reliably in the 110s systolic throughout ED stay.    Will obtain screening EKG to rule out obvious structural abnormality or dysrhythmia.  Clinical history is consistent with likely vagal event.  Lab work unrevealing.  If EKG is unrevealing patient is appropriate for discharge.

## 2022-12-09 NOTE — ED PROVIDER NOTE - RAPID ASSESSMENT
57y F w/ PMHx of CVA (Dec 2021), HLD, GERD, HTN presents to the ED for hypotension. Pt was walking her dog when she had sudden onset abd pain and urge to defecate. Walked back home, felt lightheadedness and cold sweats while on the toilet. Pt called EMS, EMS on scene measured low blood pressure and was recommended to come to the ED. Pt states symptoms have resolved since incident. Pt previously syncopated 3 years ago. Pt is well appearing in triage. Pt is awake, alert and in no distress.    Carli MARTINEZ (Scribe) have documented this rapid assessment note under the dictation of Raghav Lal) which has been reviewed and affirmed to be accurate. Patient was seen as a QDOC patient. 57y F w/ PMHx of CVA (Dec 2021), HLD, GERD, HTN presents to the ED for hypotension. Pt was walking her dog when she had sudden onset abd pain and urge to defecate. Walked back home, felt lightheadedness and cold sweats while on the toilet. Pt called EMS, EMS on scene measured low blood pressure and was recommended to come to the ED. Pt states symptoms have resolved since incident. Pt previously syncopated 3 years ago. Pt is well appearing in triage. Pt is awake, alert and in no distress.    Carli MARTINEZ (Scribe) have documented this rapid assessment note under the dictation of Raghav Lal (MD) which has been reviewed and affirmed to be accurate. Patient was seen as a QDOC patient.    Pt seen as Triage/Q-doc full evaluation to be performed once patient is transferred to main ED  Raghav Lal MD, Facep

## 2022-12-09 NOTE — ED PROVIDER NOTE - NSFOLLOWUPINSTRUCTIONS_ED_ALL_ED_FT
You were seen in the emergency department for transient low blood pressure.  Your blood work, EKG and chest x-ray were all within normal limits.    It is likely that your episode was from what is called a vasovagal event.  This is not a dangerous cause of your symptoms.  It could have been related to the discomfort that you are having.    Stay well-hydrated.  Please follow-up with your primary care physician in 1 to 2 weeks for further evaluation.    Return to the emergency department for severe chest pain shortness of breath or any other concern.

## 2022-12-09 NOTE — ED PROVIDER NOTE - PATIENT PORTAL LINK FT
You can access the FollowMyHealth Patient Portal offered by [facility] by registering at the following website: [email or web address]. By joining [facility]’s FollowMyHealth portal, you will also be able to view your health information using other applications (apps) compatible with our system.

## 2022-12-09 NOTE — ED ADULT NURSE NOTE - OBJECTIVE STATEMENT
First encounter with the pt, pt received from triage with complaints of weakness and low blood pressure. Pt  stated that after she took a shower she felt weak, dizziness and noted her blood pressure was low. EMS was called and FAST performed. Pt denies any s/s of weakness, dizziness and nausea at this current time. . Pt is a/ox4 and verbal. Speech is clear and able to speak in full sentences without distress. Airway is patent with no obstruction nor blocking secretions. Breathing is even and unlabored on room air. Pt has strong pulses palpated bilaterally. Neuro check is wnl. Full rom. Pt denies chest pain, n/v and sob. No acute distress noted. Pt has call light within the reach of the pt at the bedside. Will continue to monitor the pt.

## 2022-12-12 ENCOUNTER — NON-APPOINTMENT (OUTPATIENT)
Age: 57
End: 2022-12-12

## 2022-12-12 ENCOUNTER — APPOINTMENT (OUTPATIENT)
Dept: CARDIOLOGY | Facility: CLINIC | Age: 57
End: 2022-12-12

## 2022-12-12 ENCOUNTER — APPOINTMENT (OUTPATIENT)
Dept: INTERNAL MEDICINE | Facility: CLINIC | Age: 57
End: 2022-12-12

## 2022-12-12 VITALS
DIASTOLIC BLOOD PRESSURE: 83 MMHG | SYSTOLIC BLOOD PRESSURE: 131 MMHG | RESPIRATION RATE: 16 BRPM | HEART RATE: 77 BPM | BODY MASS INDEX: 25.49 KG/M2 | WEIGHT: 135 LBS | TEMPERATURE: 98 F | OXYGEN SATURATION: 98 % | HEIGHT: 61 IN

## 2022-12-12 VITALS
SYSTOLIC BLOOD PRESSURE: 132 MMHG | HEART RATE: 85 BPM | BODY MASS INDEX: 26.11 KG/M2 | OXYGEN SATURATION: 98 % | TEMPERATURE: 98.7 F | HEIGHT: 60 IN | WEIGHT: 133 LBS | DIASTOLIC BLOOD PRESSURE: 80 MMHG

## 2022-12-12 DIAGNOSIS — R63.0 ANOREXIA: ICD-10-CM

## 2022-12-12 DIAGNOSIS — R55 SYNCOPE AND COLLAPSE: ICD-10-CM

## 2022-12-12 PROCEDURE — 99213 OFFICE O/P EST LOW 20 MIN: CPT | Mod: 25

## 2022-12-12 PROCEDURE — 99214 OFFICE O/P EST MOD 30 MIN: CPT | Mod: 25

## 2022-12-12 PROCEDURE — 90471 IMMUNIZATION ADMIN: CPT

## 2022-12-12 PROCEDURE — 90686 IIV4 VACC NO PRSV 0.5 ML IM: CPT

## 2022-12-12 PROCEDURE — 93000 ELECTROCARDIOGRAM COMPLETE: CPT

## 2022-12-12 RX ORDER — ATORVASTATIN CALCIUM 40 MG/1
40 TABLET, FILM COATED ORAL
Qty: 30 | Refills: 0 | Status: DISCONTINUED | COMMUNITY
Start: 2022-06-28

## 2022-12-13 LAB
ALBUMIN SERPL ELPH-MCNC: 4.2 G/DL
ALP BLD-CCNC: 135 U/L
ALT SERPL-CCNC: 19 U/L
ANION GAP SERPL CALC-SCNC: 11 MMOL/L
AST SERPL-CCNC: 18 U/L
BILIRUB SERPL-MCNC: 0.4 MG/DL
BUN SERPL-MCNC: 11 MG/DL
CALCIUM SERPL-MCNC: 9.6 MG/DL
CHLORIDE SERPL-SCNC: 104 MMOL/L
CHOLEST SERPL-MCNC: 139 MG/DL
CO2 SERPL-SCNC: 24 MMOL/L
CREAT SERPL-MCNC: 0.44 MG/DL
EGFR: 113 ML/MIN/1.73M2
GLUCOSE SERPL-MCNC: 121 MG/DL
HDLC SERPL-MCNC: 54 MG/DL
LDLC SERPL CALC-MCNC: 59 MG/DL
NONHDLC SERPL-MCNC: 85 MG/DL
POTASSIUM SERPL-SCNC: 4.1 MMOL/L
PROT SERPL-MCNC: 6.9 G/DL
SODIUM SERPL-SCNC: 140 MMOL/L
TRIGL SERPL-MCNC: 131 MG/DL

## 2023-02-01 ENCOUNTER — NON-APPOINTMENT (OUTPATIENT)
Age: 58
End: 2023-02-01

## 2023-02-01 DIAGNOSIS — R41.3 OTHER AMNESIA: ICD-10-CM

## 2023-02-13 ENCOUNTER — RX RENEWAL (OUTPATIENT)
Age: 58
End: 2023-02-13

## 2023-03-10 ENCOUNTER — OUTPATIENT (OUTPATIENT)
Dept: OUTPATIENT SERVICES | Facility: HOSPITAL | Age: 58
LOS: 1 days | End: 2023-03-10
Payer: MEDICAID

## 2023-03-10 ENCOUNTER — APPOINTMENT (OUTPATIENT)
Dept: MRI IMAGING | Facility: CLINIC | Age: 58
End: 2023-03-10
Payer: MEDICAID

## 2023-03-10 DIAGNOSIS — I63.9 CEREBRAL INFARCTION, UNSPECIFIED: ICD-10-CM

## 2023-03-10 PROCEDURE — 70551 MRI BRAIN STEM W/O DYE: CPT | Mod: 26

## 2023-03-10 PROCEDURE — 70551 MRI BRAIN STEM W/O DYE: CPT

## 2023-03-14 ENCOUNTER — NON-APPOINTMENT (OUTPATIENT)
Age: 58
End: 2023-03-14

## 2023-03-14 NOTE — H&P ADULT - NSHPRISKHIVSCREEN_GEN_ALL_CORE
[facility]     Child / Adolescent Structured Interview  Standard Diagnostic Assessment    PATIENT'S NAME: Christine Patel  PREFERRED NAME: Christine  PREFERRED PRONOUNS: She/Her/Hers/Herself  MRN:   [id number]  :   2007  ACCT. NUMBER: [id number]  DATE OF SERVICE: 3/14/23  START TIME: 4:07 PM  END TIME: 4:59 PM  Service Modality:  Telephone Visit for majority of appointment due to issues with video:      Provider verified identity through the following two step process.  Patient provided:  Patient is known previously to provider    Telemedicine Visit: The patient's condition can be safely assessed and treated via synchronous audio and visual telemedicine encounter.      Reason for Telemedicine Visit: Services only offered telehealth    Originating Site (Patient Location): Patient's home    Distant Site (Provider Location): Provider Remote Setting- Home Office    Consent:  The patient/guardian has verbally consented to: the potential risks and benefits of telemedicine (video visit) versus in person care; bill my insurance or make self-payment for services provided; and responsibility for payment of non-covered services.     Patient would like the video invitation sent by:  My Chart    Mode of Communication:  Telephone    Distant Location (Provider):  Off-site    As the provider I attest to compliance with applicable laws and regulations related to telemedicine.      UNIVERSAL CHILD/ADOLESCENT Mental Health DIAGNOSTIC ASSESSMENT    Identifying Information:   Patient is a 15 year old,  individual who was female at birth and who identifies as female.  The pronoun use throughout this assessment reflects their pronouns.  Patient is currently a client of therapists; diagnostic assessment being completed as an update. Patient attended this assessment with mother. There are no language or communication issues or need for modification in treatment. Patient identified their preferred  "language to be English. Patient does not need the assistance of an  or other support.    Patient and Parent's Statements of Presenting Concern:  Patient's mother reported the following reason(s) for seeking assessment:     Client's mother reported, \"when she feels overwhelmed, she checks out, she is done\".  Client's mother reported client experiences anxiety about school, specifically peer relationships, test taking, giving speeches, and keeping up with homework assignments.  Client's mother reported increase in sleeping during the day.    Client reported her counselor and teachers recently talked with her about potential benefits of a 504 plan.  Client's mother reported the school offered noise cancelling headphones and  client from other students in the classroom as an accomodation but client refused.    Patient reported the reason for seeking assessment as agreeable to what her mother discussed.  They report this assessment is not court ordered.  her symptoms have resulted in the following functional impairments: relationships, school    History of Presenting Concern:  The mother reports these concerns began when client was younger.  Issues contributing to the current problem include: academic concerns.  Patient/family has attempted to resolve these concerns in the past through individual therapy and medication. Patient reports that other professional(s) are involved in providing support services at this time physician / PCP.     Family and Social History:  Patient grew up in [place], [place].  Parents did not  and are not together. The patient lives with grandparents, mother, step-father, and siblings.  The patient has 2 siblings, includin brother(s) ages 10 and 12. They noted that they were the first born. The patient's living situation appears to be stable, as evidenced by client's mother's appropriate support and concerns.  Patient reports the following stressors: family " conflict.  Patient's mother denied family conflict; she reported that she views the interactions between client and parents as normal teenage/parent interaction.  Family does not have economic concerns they would like addressed.  The family reports the child shows care/affection by hugs.  Parent describes discipline used as take away privileges.  Patient indicates family is sometimes supportive, and she does want family involved in any treatment/therapy recommendations. Family reports electronic use includes cell phone (4 hours a day on average) and school ipad.  The family does not use blocking devices for computer, TV, or internet. The following legal issues have been identified: none.   Patient reports engaging in the following recreational/leisure activities: manager of gymnastics team, cheer     Patient's spiritual/Sikhism preference is Restoration.  Family's spiritual/Sikhism preference is Restoration.   Cultural influences and impact on patient's life structure, values, norms, and healthcare are: Sikhism beliefs.  Contextual influences on patient's health include: Contextual Factors: Family Factors client reported relationship with parents can be a stressor.    Patient reports the following spiritual or cultural needs:none identified. Cultural, contextual, and socioeconomic factors do not affect the patient's access to services      Developmental History:  There were no reported complications during pregnanacy or birth. Major childhood medical conditions / injuries include: hospitalized 9 months old for ecoli infection- one week.  There is not a significant history of separation from primary caregiver(s).  Client has not had a relationship with biological father.  There death of extended family members, cousin's dog. There are reported problems with sleep. Sleep problems include: difficulties staying asleep at night.  Family reports patient strengths are strong willed, kind.  Patient reports her strengths are  Offered and patient declined helpful    Family does not report concerns about sexual development. Patient describes her gender identity as female  Patient describes her sexual orientation as heterosexual.   Patient reports she is interested in dating but not currently in a relationship..  There are not concerns around dating/sexual relationships.  Patient has not been a victim of exploitation.      Education:  The patient currently attends school at [employer], and is in the 9th grade. There is not a history of grade retention or special educational services. Patient is not behind in credits.  There is a history of ADHD symptoms: primarily inattentive type. Client  has not been assessed or diagnosed with ADHD.  Past academic performance was below grade level during elementary school and current performance is at grade level. Patient/parent reports patient does have the ability to understand age appropriate written materials. Patient/family reports academic strengths in the area of reading. Patient's preferred learning style is social/interpersonal and solitary/intrapersonal. Patient/family reports experiencing academic challenges in math.  Patient reported significant behavior and discipline problems including: none.  Patient/family report there are no concerns about patient's ability to function appropriately at school. Patient identified extensive stable and meaningful social connections.  Peer relationships are age appropriate.    Patient does not have a job and is not interested in working at this time..    Medical Information:  Patient has had a physical exam to rule out medical causes for current symptoms.  Date of last physical exam was within the past year. Client was encouraged to follow up with PCP if symptoms were to develop. The patient has a [place] Primary [facility] Provider, who is named Rustam May..  Patient reports no current medical concerns.  Patient denies any issues with pain..  Patient denies they are  sexually active. There are no concerns with vision or hearing.  The patient reports not having a psychiatrist.    [facility] medication list reviewed 3/14/2023:   Zoloft 50 mg    Provider verified patient's current medications as listed above.  The biological mother do report concerns about patient's medication adherence.      Medical History:  Past Medical History:   Diagnosis Date     E coli enteritis      Otitis media      Urinary tract infection           Allergies   Allergen Reactions     Penicillins Hives     Provider verified patient's allergies as listed above.    Family History:  family history includes Family History Negative in her brother and mother; Gastrointestinal Disease in her mother.    Substance Use Disorder History:  Patient reported the following biological family members or relatives with chemical health issues:  Uncle with history of alcohol, cannabis, and heroin use..  Patient has not received chemical dependency treatment in the past.  Patient has not ever been to detox.  Patient is not currently receiving any chemical dependency treatment.     Patient denies using alcohol.  Patient denies using tobacco.  Patient denies using cannabis.  Patient reports using caffeine about three times per week and drinks 1 at a time. Patient reported she has recently been decreasing use.  Patient reports using/abusing the following substance(s). None identified    Patient does not have other addictive behaviors she is concerned about.    Mental Health History:  Patient does report a family history of mental health concerns - see family history section.  Patient previously received the following mental health diagnosis: an Anxiety Disorder.  Patient's mom reported she has received feedback from teachers since  she has difficulty with concentrating.  Patient has received the following mental health services in the past:  physician / PCP and individual therapy. Hospitalizations: None  Patient is currently  receiving the following services:  physician / PCP.    Psychological and Social History Assessment / Questionnaire:  Over the past 2 weeks, mother reports their child had problems with the following:   Feeling Sad, Problems with concentration/attention, Sleeping more than usual, Eating less than usual, Seeming withdrawn or isolated, Worrying, Irritable/angry, Hyperactive, Too much time on TV, Video games, cell phone/social media and Relationship problems with parents    Review of Symptoms:  Depression: Change in sleep, Difficulties concentrating and Irritability  Mena:  No Symptoms  Psychosis: No Symptoms  Anxiety: Excessive worry, Nervousness, Fears/phobias speaking in front of class, Sleep disturbance, Ruminations, Poor concentration and Irritability  Panic:  No symptoms  Post Traumatic Stress Disorder: No Symptoms  Eating Disorder: No Symptoms  Oppositional Defiant Disorder:  No Symptoms  ADD / ADHD:  Inattentive, Distractibility, Restlessness/fidgety and Hyperactive  Autism Spectrum Disorder: No symptoms  Obsessive Compulsive Disorder: No Symptoms  Other Compulsive Behaviors: None   Substance Use:  No symptoms       There was agreement between parent and child symptom report with minimal exception (ie sleep and eating).          Assessments:   The following assessments were completed by patient for this visit:  PROMIS Parent Proxy Scale V1.0 Global Health 7+2:   Promis Parent Proxy Scale V1.0-Global Health 7+2    3/14/2023  4:42 PM CDT - Filed by Tammie Pina [facility]   In general, would you say your child's health is: Very Good   In general, would you say your child's quality of life is: Very Good   In general, how would you rate your child's physical health? Very Good   In general, how would you rate your child's mental health, including mood and ability to think? Good   How often does your child feel really sad? Sometimes   How often does your child have fun with friends? Always   How often does your child  feel that you listen to his or her ideas? Often   In the past 7 days   My child got tired easily. Almost Always   My child had trouble sleeping when he/she had pain. Never   PROMIS Parent Proxy Global Health T-Score (range: 10 - 90) Incomplete   PROMIS Parent Proxy Global Fatigue Item  T-Score (range: 10 - 90) Incomplete   PROMIS Parent Proxy Pain Interference T-Score (range: 10 - 90) Incomplete       Safety Issues:  Patient denies current homicidal ideation and behaviors.  Patient denies current self-injurious ideation and behaviors.    Patient denied risk behaviors associated with substance use.  Patient denies any high risk behaviors associated with mental health symptoms.  Patient reports the following current concerns for their personal safety: None.  Patient denies current/recent assaultive behaviors.    Patient reports there are   firearms in the house.    The firearms are secured in a locked space.    History of Safety Concerns:  Patient denied a history of homicidal ideation.     Patient denied a history of self-injurious ideation and behaviors.    Patient denied a history of personal safety concerns.    Patient denied a history of assaultive behaviors.    Patient denied a history of risk behaviors associated with substance use.  Patient denies any history of high risk behaviors associated with mental health symptoms.     Client and Mother reports the patient has had a history of none    Patient reports the following protective factors: spirituality, positive relationships positive social network and positive family connections, secure attachment, abstinence from substances, adherence with prescribed medication, living with other people and pets      Mental Status Assessment:  Appearance:  unable to assess via telephone   Eye Contact:  unable to assess via telephone   Psychomotor:  unable to assess via telephone       Gait / station:  Unable to assess  Attitude / Demeanor: Cooperative  Interested Use of  Humor   Speech      Rate / Production: Normal/ Responsive      Volume:  Normal  volume  Mood:   Anxious   Affect:   unable to assess via telephone   Thought Content: Clear   Thought Process: Coherent       Associations: Volume: Normal    Insight:   Good   Judgment:  Intact   Orientation:  All  Attention/concentration:  Fair      DSM5 Criteria:  Generalized Anxiety Disorder  A. Excessive anxiety and worry about a number of events or activities (such as work or school performance).   B. The person finds it difficult to control the worry.  C. Select 3 or more symptoms (required for diagnosis). Only one item is required in children.   - Restlessness or feeling keyed up or on edge.    - Being easily fatigued.    - Difficulty concentrating or mind going blank.    - Irritability.    - Sleep disturbance (difficulty falling or staying asleep, or restless unsatisfying sleep).   D. The focus of the anxiety and worry is not confined to features of an Axis I disorder.  E. The anxiety, worry, or physical symptoms cause clinically significant distress or impairment in social, occupational, or other important areas of functioning.   F. The disturbance is not due to the direct physiological effects of a substance (e.g., a drug of abuse, a medication) or a general medical condition (e.g., hyperthyroidism) and does not occur exclusively during a Mood Disorder, a Psychotic Disorder, or a Pervasive Developmental Disorder.    Primary Diagnoses:  300.02 (F41.1) Generalized Anxiety Disorder  Secondary Diagnoses: None    Patient's Strengths and Limitations:  Patient's strengths or resources that will help she succeed in services are:family support and Orthodoxy / spirituality  Patient's limitations that may interfere with success in services:none identified .    Functional Status:  Therapist's assessment is that client has reduced functional status in the following areas: Academics / Education - test taking, speeches increase anxiety  symptoms      Recommendations:    1. Plan for Safety and Risk Management: A safety and risk management plan has been developed including: Recommended that patient call 911 or go to the local ED should there be a change in any of these risk factors.     2.  Patient agrees to the following recommendations (list in order of Priority): None    The following recommendations(s) was/were made but patient declines follow up at this time: None    Clinical Substantiation/medical necessity for the above recommendations:  Client is presenting with anxiety symptoms that are impacting academics and relationships and would likely benefit from continued support and additional coping strategies to help with managing symptoms.     3. Cultural influences and impact on patient's life structure, values, norms, and healthcare are: Orthodoxy beliefs.   Patient's spiritual/Orthodoxy preference is Mormonism.   Contextual influences on patient's health include: Contextual Factors: Family Factors client reported relationship with parents can be a stressor.     4.  Accomodations/Modifications:   services are not indicated.   Modifications to assist communication are not indicated.  Additional disability accomodations are not indicated    5.  Initial Treatment is recommended to focus on: Anxiety .    Collaboration / coordination with other professionals is not indicated at this time.     A Release of Information is not needed at this time.    Report to child / adult protection services was NA.     Interactive Complexity: No    Staff Name/Credentials:  OSCAR Reis March 14, 2023

## 2023-03-21 PROBLEM — Z78.9 OTHER SPECIFIED HEALTH STATUS: Chronic | Status: ACTIVE | Noted: 2021-12-11

## 2023-03-21 PROBLEM — Z78.9 OTHER SPECIFIED HEALTH STATUS: Chronic | Status: ACTIVE | Noted: 2021-12-10

## 2023-03-27 ENCOUNTER — APPOINTMENT (OUTPATIENT)
Dept: NEUROLOGY | Facility: CLINIC | Age: 58
End: 2023-03-27

## 2023-03-27 ENCOUNTER — APPOINTMENT (OUTPATIENT)
Dept: NEUROLOGY | Facility: CLINIC | Age: 58
End: 2023-03-27
Payer: MEDICAID

## 2023-03-27 VITALS
SYSTOLIC BLOOD PRESSURE: 106 MMHG | DIASTOLIC BLOOD PRESSURE: 68 MMHG | HEIGHT: 61 IN | WEIGHT: 129 LBS | HEART RATE: 88 BPM | BODY MASS INDEX: 24.35 KG/M2

## 2023-03-27 PROCEDURE — 99215 OFFICE O/P EST HI 40 MIN: CPT

## 2023-03-27 NOTE — ED PROVIDER NOTE - NSICDXPASTMEDICALHX_GEN_ALL_CORE_FT
Called Pt, gave info that med has been sent. Pt thanked me and stated that she had no further needs at this time. PAST MEDICAL HISTORY:  No pertinent past medical history

## 2023-03-28 DIAGNOSIS — M79.89 OTHER SPECIFIED SOFT TISSUE DISORDERS: ICD-10-CM

## 2023-04-03 ENCOUNTER — APPOINTMENT (OUTPATIENT)
Dept: INTERNAL MEDICINE | Facility: CLINIC | Age: 58
End: 2023-04-03
Payer: MEDICAID

## 2023-04-03 VITALS
TEMPERATURE: 97.8 F | HEIGHT: 61 IN | RESPIRATION RATE: 15 BRPM | BODY MASS INDEX: 24.35 KG/M2 | HEART RATE: 92 BPM | WEIGHT: 129 LBS | OXYGEN SATURATION: 98 % | SYSTOLIC BLOOD PRESSURE: 120 MMHG | DIASTOLIC BLOOD PRESSURE: 71 MMHG

## 2023-04-03 DIAGNOSIS — R74.8 ABNORMAL LEVELS OF OTHER SERUM ENZYMES: ICD-10-CM

## 2023-04-03 PROCEDURE — 99213 OFFICE O/P EST LOW 20 MIN: CPT | Mod: 25

## 2023-04-03 PROCEDURE — 36415 COLL VENOUS BLD VENIPUNCTURE: CPT

## 2023-04-03 RX ORDER — SERTRALINE 25 MG/1
25 TABLET, FILM COATED ORAL DAILY
Qty: 90 | Refills: 0 | Status: DISCONTINUED | COMMUNITY
Start: 2022-07-11 | End: 2023-04-03

## 2023-04-04 LAB
ALBUMIN SERPL ELPH-MCNC: 4.3 G/DL
ALP BLD-CCNC: 139 U/L
ALT SERPL-CCNC: 20 U/L
ANION GAP SERPL CALC-SCNC: 12 MMOL/L
AST SERPL-CCNC: 20 U/L
BILIRUB SERPL-MCNC: 0.7 MG/DL
BUN SERPL-MCNC: 12 MG/DL
CALCIUM SERPL-MCNC: 9.4 MG/DL
CHLORIDE SERPL-SCNC: 103 MMOL/L
CHOLEST SERPL-MCNC: 144 MG/DL
CO2 SERPL-SCNC: 25 MMOL/L
CREAT SERPL-MCNC: 0.45 MG/DL
EGFR: 113 ML/MIN/1.73M2
GLUCOSE SERPL-MCNC: 92 MG/DL
HDLC SERPL-MCNC: 60 MG/DL
LDLC SERPL CALC-MCNC: 55 MG/DL
NONHDLC SERPL-MCNC: 84 MG/DL
POTASSIUM SERPL-SCNC: 4.2 MMOL/L
PROT SERPL-MCNC: 7 G/DL
SODIUM SERPL-SCNC: 140 MMOL/L
TRIGL SERPL-MCNC: 146 MG/DL

## 2023-04-05 NOTE — DISCHARGE NOTE NURSING/CASE MANAGEMENT/SOCIAL WORK - HISTORY OF COVID-19 VACCINATION
Contacted patient via phone call to convey positive Strep throat lab test result and new prescription medication order for Amoxicillin. RN advised patient to complete the entire course of the antibiotic until gone, even if starting to feel better, and to obtain a new toothbrush after being on the Amoxicillin for at least 24-48 hours. Patient verbalizes understanding and has no further questions at this time.   Vaccine status unknown

## 2023-04-21 ENCOUNTER — APPOINTMENT (OUTPATIENT)
Dept: ULTRASOUND IMAGING | Facility: CLINIC | Age: 58
End: 2023-04-21
Payer: MEDICAID

## 2023-04-21 ENCOUNTER — OUTPATIENT (OUTPATIENT)
Dept: OUTPATIENT SERVICES | Facility: HOSPITAL | Age: 58
LOS: 1 days | End: 2023-04-21
Payer: MEDICAID

## 2023-04-21 ENCOUNTER — RESULT REVIEW (OUTPATIENT)
Age: 58
End: 2023-04-21

## 2023-04-21 ENCOUNTER — APPOINTMENT (OUTPATIENT)
Dept: ULTRASOUND IMAGING | Facility: CLINIC | Age: 58
End: 2023-04-21

## 2023-04-21 DIAGNOSIS — R74.8 ABNORMAL LEVELS OF OTHER SERUM ENZYMES: ICD-10-CM

## 2023-04-21 DIAGNOSIS — M79.89 OTHER SPECIFIED SOFT TISSUE DISORDERS: ICD-10-CM

## 2023-04-21 DIAGNOSIS — Z00.8 ENCOUNTER FOR OTHER GENERAL EXAMINATION: ICD-10-CM

## 2023-04-21 PROCEDURE — 76700 US EXAM ABDOM COMPLETE: CPT | Mod: 26

## 2023-04-21 PROCEDURE — 93971 EXTREMITY STUDY: CPT | Mod: 26,RT

## 2023-04-21 PROCEDURE — 93971 EXTREMITY STUDY: CPT

## 2023-04-21 PROCEDURE — 76700 US EXAM ABDOM COMPLETE: CPT

## 2023-06-19 ENCOUNTER — NON-APPOINTMENT (OUTPATIENT)
Age: 58
End: 2023-06-19

## 2023-07-06 ENCOUNTER — RX RENEWAL (OUTPATIENT)
Age: 58
End: 2023-07-06

## 2023-07-10 ENCOUNTER — APPOINTMENT (OUTPATIENT)
Dept: INTERNAL MEDICINE | Facility: CLINIC | Age: 58
End: 2023-07-10
Payer: MEDICAID

## 2023-07-10 VITALS
TEMPERATURE: 98 F | WEIGHT: 129 LBS | RESPIRATION RATE: 15 BRPM | DIASTOLIC BLOOD PRESSURE: 86 MMHG | SYSTOLIC BLOOD PRESSURE: 131 MMHG | OXYGEN SATURATION: 97 % | BODY MASS INDEX: 24.35 KG/M2 | HEART RATE: 77 BPM | HEIGHT: 61 IN

## 2023-07-10 DIAGNOSIS — Z12.4 ENCOUNTER FOR SCREENING FOR MALIGNANT NEOPLASM OF CERVIX: ICD-10-CM

## 2023-07-10 DIAGNOSIS — Z12.11 ENCOUNTER FOR SCREENING FOR MALIGNANT NEOPLASM OF COLON: ICD-10-CM

## 2023-07-10 DIAGNOSIS — Z87.891 PERSONAL HISTORY OF NICOTINE DEPENDENCE: ICD-10-CM

## 2023-07-10 DIAGNOSIS — Z12.83 ENCOUNTER FOR SCREENING FOR MALIGNANT NEOPLASM OF SKIN: ICD-10-CM

## 2023-07-10 PROCEDURE — 99396 PREV VISIT EST AGE 40-64: CPT | Mod: 25

## 2023-07-10 PROCEDURE — 36415 COLL VENOUS BLD VENIPUNCTURE: CPT

## 2023-07-11 LAB
25(OH)D3 SERPL-MCNC: 37.7 NG/ML
ALBUMIN SERPL ELPH-MCNC: 4.3 G/DL
ALP BLD-CCNC: 123 U/L
ALT SERPL-CCNC: 18 U/L
ANION GAP SERPL CALC-SCNC: 11 MMOL/L
AST SERPL-CCNC: 22 U/L
BILIRUB SERPL-MCNC: 0.6 MG/DL
BUN SERPL-MCNC: 12 MG/DL
CALCIUM SERPL-MCNC: 9.6 MG/DL
CHLORIDE SERPL-SCNC: 105 MMOL/L
CHOLEST SERPL-MCNC: 130 MG/DL
CO2 SERPL-SCNC: 24 MMOL/L
CREAT SERPL-MCNC: 0.41 MG/DL
EGFR: 115 ML/MIN/1.73M2
ESTIMATED AVERAGE GLUCOSE: 114 MG/DL
GLUCOSE SERPL-MCNC: 98 MG/DL
HBA1C MFR BLD HPLC: 5.6 %
HCT VFR BLD CALC: 42.2 %
HDLC SERPL-MCNC: 62 MG/DL
HGB BLD-MCNC: 13.7 G/DL
LDLC SERPL CALC-MCNC: 53 MG/DL
MCHC RBC-ENTMCNC: 28.4 PG
MCHC RBC-ENTMCNC: 32.5 GM/DL
MCV RBC AUTO: 87.6 FL
NONHDLC SERPL-MCNC: 68 MG/DL
PLATELET # BLD AUTO: 280 K/UL
POTASSIUM SERPL-SCNC: 4 MMOL/L
PROT SERPL-MCNC: 6.8 G/DL
RBC # BLD: 4.82 M/UL
RBC # FLD: 13.9 %
SODIUM SERPL-SCNC: 139 MMOL/L
TRIGL SERPL-MCNC: 78 MG/DL
TSH SERPL-ACNC: 0.75 UIU/ML
WBC # FLD AUTO: 6 K/UL

## 2023-07-25 ENCOUNTER — NON-APPOINTMENT (OUTPATIENT)
Age: 58
End: 2023-07-25

## 2023-07-25 ENCOUNTER — APPOINTMENT (OUTPATIENT)
Dept: CARDIOLOGY | Facility: CLINIC | Age: 58
End: 2023-07-25
Payer: MEDICAID

## 2023-07-25 VITALS
OXYGEN SATURATION: 96 % | DIASTOLIC BLOOD PRESSURE: 85 MMHG | SYSTOLIC BLOOD PRESSURE: 136 MMHG | HEART RATE: 71 BPM | HEIGHT: 61 IN

## 2023-07-25 PROCEDURE — 93000 ELECTROCARDIOGRAM COMPLETE: CPT

## 2023-07-25 PROCEDURE — 99214 OFFICE O/P EST MOD 30 MIN: CPT | Mod: 25

## 2023-07-25 NOTE — HISTORY OF PRESENT ILLNESS
[FreeTextEntry1] : 57 year old woman with history of smoking who presented to [facility] after CVA on December 11, 2021\par No medical care or followup prior to that. Discovered uncontrolled HTN and dc'd to rehab\par No history of arrhythmia.\par \par TTE- Normal LV in December\par \par #CVA- Patient followed by Neurology and neurosurgery, per notes CVA in setting of HTN\par Continue statin\par #HTN- Lisinopril 10 mg daily\par #HLD- continue statin\par Currently on Lipitor 20 mg daily

## 2023-07-25 NOTE — DISCUSSION/SUMMARY
[EKG obtained to assist in diagnosis and management of assessed problem(s)] : EKG obtained to assist in diagnosis and management of assessed problem(s) [FreeTextEntry1] : 57 year old woman with CVA; HTN here to establish care. Accompanied by her sister.\par #CVA- Patient followed by Neurology and neurosurgery, per notes CVA in setting of HTN\par Continue statin\par #HTN- Lisinopril 10 mg daily\par #HLD- continue statin\par Currently on Lipitor 20 mg daily\par #FU in 4 months

## 2023-08-01 ENCOUNTER — NON-APPOINTMENT (OUTPATIENT)
Age: 58
End: 2023-08-01

## 2023-08-01 VITALS — BODY MASS INDEX: 25.11 KG/M2 | HEIGHT: 61 IN | WEIGHT: 133 LBS

## 2023-08-01 DIAGNOSIS — Z87.891 PERSONAL HISTORY OF NICOTINE DEPENDENCE: ICD-10-CM

## 2023-08-01 NOTE — HISTORY OF PRESENT ILLNESS
[Former] : Former [TextBox_13] : Referred by Dr. Concepcion  Ms. SURINDER FERNANDEZ is a 57 year old woman with a history of nicotine dependence   Over the telephone today we reviewed and confirmed that the patient meets screening eligibility criteria:  -Age: 57 years old  Smoking status:  -Former smoker, 20 pack years, 2019   Ms. FERNANDEZ denies any personal history of lung cancer. Denies any s/s of lung cancer. No lung cancer in a 1st degree relative. Denies any history of lung disease. Denies any history of occupational exposures [YearQuit] : 2019 [PacksperYear] : 20

## 2023-08-01 NOTE — REASON FOR VISIT
[Home] : at home, [unfilled] , at the time of the visit. [Medical Office: ([facility])___] : at the medical office located in  [Verbal consent obtained from patient] : the patient, [unfilled] [Initial Evaluation] : an initial evaluation visit [Review of Eligibility] : review of eligibility [Low-Dose CT Screening Discussion] : low-dose CT lung cancer screening discussion [Virtual Visit] : virtual visit

## 2023-08-02 ENCOUNTER — APPOINTMENT (OUTPATIENT)
Dept: CT IMAGING | Facility: CLINIC | Age: 58
End: 2023-08-02

## 2023-08-29 DIAGNOSIS — M25.569 PAIN IN UNSPECIFIED KNEE: ICD-10-CM

## 2023-09-08 ENCOUNTER — APPOINTMENT (OUTPATIENT)
Dept: NEUROLOGY | Facility: CLINIC | Age: 58
End: 2023-09-08
Payer: MEDICAID

## 2023-09-08 VITALS
SYSTOLIC BLOOD PRESSURE: 137 MMHG | BODY MASS INDEX: 25.11 KG/M2 | DIASTOLIC BLOOD PRESSURE: 86 MMHG | HEART RATE: 81 BPM | WEIGHT: 133 LBS | HEIGHT: 61 IN

## 2023-09-08 PROCEDURE — 99214 OFFICE O/P EST MOD 30 MIN: CPT

## 2023-09-08 RX ORDER — DOCUSATE SODIUM 100 MG/1
100 CAPSULE, LIQUID FILLED ORAL DAILY
Qty: 90 | Refills: 0 | Status: DISCONTINUED | COMMUNITY
Start: 2023-04-03 | End: 2023-09-08

## 2023-09-08 RX ORDER — OMEGA-3/DHA/EPA/FISH OIL 300-1000MG
1000 CAPSULE ORAL DAILY
Qty: 90 | Refills: 0 | Status: DISCONTINUED | COMMUNITY
Start: 2023-04-03 | End: 2023-09-08

## 2023-09-08 RX ORDER — GABAPENTIN 100 MG/1
100 CAPSULE ORAL 3 TIMES DAILY
Qty: 180 | Refills: 3 | Status: DISCONTINUED | COMMUNITY
Start: 2022-02-22 | End: 2023-09-08

## 2023-09-08 RX ORDER — NUT.TX.IMPAIRED DIGESTIVE FXN 0.035-1/ML
LIQUID (ML) ORAL
Qty: 30 | Refills: 0 | Status: DISCONTINUED | COMMUNITY
Start: 2022-12-12 | End: 2023-09-08

## 2023-09-08 RX ORDER — ZOSTER VACCINE RECOMBINANT, ADJUVANTED 50 MCG/0.5
50 KIT INTRAMUSCULAR
Qty: 1 | Refills: 1 | Status: DISCONTINUED | COMMUNITY
Start: 2022-04-29 | End: 2023-09-08

## 2023-09-08 RX ORDER — FAMOTIDINE 40 MG/1
40 TABLET, FILM COATED ORAL
Qty: 30 | Refills: 3 | Status: DISCONTINUED | COMMUNITY
Start: 2022-02-16 | End: 2023-09-08

## 2023-09-08 NOTE — DISCUSSION/SUMMARY
[Intensive Blood Pressure Control] : intensive blood pressure control [Lipid Lowering Therapy] : lipid lowering therapy [Patient encouraged to discuss with Primary MD] : I encouraged the patient to discuss these important issues with ~his/her~ primary care doctor [Goals and Counseling] : I have reviewed the goals of stroke risk factor modification. I counseled the patient on measures to reduce stroke risk, including the importance of medication compliance, risk factor control, exercise, healthy diet and avoidance of smoking. I reviewed stroke warning signs and symptoms and appropriate actions to take if such occur. [FreeTextEntry1] : Impression: Left basal ganglia intracerebral hemorrhage based on location appears to be related to uncontrolled hypertension  Right Leg feeling of swelling - does not appear to be due to DVT, no fever. She will reached out to PCP for follow up. will need USG  Checks BP at home -116-120 systolic.   I have ordered an MRI brain with and without contrast to asses for underlying lesions/malformations which were obscured by the hemorrhage.   Her MRI in the hospital revealed small acute infarcts involving the left superior and middle frontal gyri. These infarcts are likely due to drop in blood pressure while in the hospital and thus don't require further workup.  From her previous visit -  She mentioned increasing neuropathic pain. We will slowly titrate her dose of Gabapentin up, first from 100mg bid to 100mg tid, with goal of 600mg (100mg , 100mg, 400mg), if tolerated.  Sent to her pharmacy. We discussed that she should not take Gabapentin with oxycodone due to risk of respiratory depression.  We discussed importance of aggressive vascular risk factor control, and specifically maintaining blood pressure in normotension range. I advised her to purchase blood pressure machine and check blood pressure daily. She should record readings daily and let her PCP know if readings are consistently above 130/80.  She should continue with home PT and Speech and initiate OT as planned.  I recommend she establish care with cardiologist due to history of intracerebral hemorrhage and for assistance with blood pressure control. Referral provided.   She endorsed snoring at night, raising concern for obstructive sleep apnea. I recommend she have home sleep evaluation. Stopped Keppra.  She should follow up with me in 1-2 months with Doppler and TCD  10/17/2022 Overall she is neurologically stable and improving. Repeat MRI/MRA on 3/25/22 was negative for underlying enhancing lesion or vascular lesion, and the residual hemorrhage essentially resolved. revealed No underlying enhancing lesion or vascular lesion. Due to new headaches I have ordered an MRI to assess for new pathology   She should continue on Gabapentin, as she reports it helps with her neuropathic pain.  She should continue from aggressive vascular risk factor control.  She is scheduled to see cardiology next week, and has an appointment with a new PCP in 1 month. She needs a referral for dopplers from her PCP. She will obtain a referral at the visit, and we will schedule dopplers once she has the referral.  She is doing OT and Speech at [facility] and is planning to start PT soon. She should see improvements with continued therapy.  She again endorsed snoring at night, raising concern for obstructive sleep apnea. I again recommend she have a home sleep study.   9/8/23 - Overall she is neurologically stable and doing well. - She will attempt to take gabapentin 100mg bid, instead of tid. If she tolerates it well, we can further titrate down. - She should continue to benefit from vascular risk factor control, specifically aggressive BP control.  She will have carotid Dopplers at her comvenience. She can follow up in 9 months with repeat Dopplers.

## 2023-09-08 NOTE — REVIEW OF SYSTEMS
[As Noted in HPI] : as noted in HPI [Loss Of Hearing] : hearing loss [Negative] : Heme/Lymph [Fever] : no fever [Chills] : no chills [FreeTextEntry2] : salvador [FreeTextEntry4] : Decreased hearing on R side, doesn't like the same food she used to like since stroke

## 2023-09-08 NOTE — HISTORY OF PRESENT ILLNESS
[FreeTextEntry1] : HPI from [facility]: Patient is a 56yo RH F w/ no reported PMHx, not on AC/AP, who was found down by daughter with R weakness and slurred speech on 12/11/21. LKN 23:00. Patient was found down by her family on her living room floor after they heard a thud. Patient was on her stomach, and subsequently noted to not be moving her side and to have slurred speech. Patient had reported c/o fatigue just prior to the episode.  She had not followed up with her PCP in nearly two years due to COVID. 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  LKN 23:00 on 12/10/21 NIHSS: 14 Baseline mRS: 0  Complains of right leg numbness; feeling of numbness.    Imaging showed left 4.5 x 2.4 x 3.6 cm BG IPH with no shift. 10hr repeat done at [facility] stable.   Cerebral angiogram performed by neurosurgical service did not reveal any vascular malformation or lesions.  Workup at [facility] included: CT Head 12/11/21 4.5 x 2.2 cm acute left basal ganglia parenchymal hemorrhage with surrounding edema. Mass effect upon the left lateral ventricle, without midline shift.  Cerebral angiogram 12/11/21: Diagnostic cerebral angiogram demonstrating no evidence of aneurysm, arteriovenous malformation or fistula, or vasculopathy to explain the left basal ganglia hemorrhage.  MRI brain 12/15/21:  -4.8 cm left basal ganglia hemorrhage, unchanged. No underlying mass identified. -Small acute infarcts are noted involving the left superior and middle frontal gyri.  TTE 12/13/21 1. Normal mitral valve. Minimal mitral regurgitation. 2. Normal trileaflet aortic valve. No aortic valve regurgitation seen. 3. Endocardial visualization enhanced with intravenous injection of Ultrasonic Enhancing Agent (Definity). Overall preserved left ventricular systolic function. No left ventricular thrombus. The basal inferior wall, and the mid inferior wall are hypokinetic. 4. Mild diastolic dysfunction (Stage I). 5. Normal right ventricular size and systolic function.  2/18/22 She presents today with her sister and daughter. Following hospitalization, she completed 2 weeks at [facility] and then another 6 weeks at [facility] rehab. She reports great improvement but still requires help with completing  ADLs. Since discharge home, she reports worsening of neuropathic pain in right leg and arm. She is currently enrolled in home PT and speech and will initiate OT soon.  Of note, she quit smoking cigarettes when she was in her 20s (1 pack per day). 8 months ago, she began smoking 1 cigarette a day.  4/1/22 She presents today with her sister. She reports good medication compliance and blood pressure control. She denies nay interval stroke or TIA symptoms.  Dr. Concepcino PCP Dr [facility]  MRA 3/25/22: Evolving area of parenchymal hemorrhage is seen when compared prior exam. There is now evidence of encephalomalacia and gliosis as well as abnormal susceptibility seen in the left basal ganglia/perisylvian region. No abnormal areas of enhancement is seen. Unremarkable MRA of the [place]. MRI head w/wo IV contrast 3/25/22: No underlying enhancing lesion or vascular lesion.  6/22/2022 Today she reports feeling stable. She remains with impaired taste and on Sunday she notes her taste was "differrent" but with no associate symptoms. She is compliant with meds. Presents to discuss disability  10/17/2022 Today she reports some breakthrough burning sensation and daily generalized headaches. She is compliant with meds and denies any new or worsening symptoms.   9/8/23 She presents today with her daughter. She has been having swelling of her right leg for months without associated pain/erythema. she had an ultrasound which was negative.  She denies any new focal neurologic symptoms.

## 2023-09-08 NOTE — PHYSICAL EXAM
[General Appearance - Alert] : alert [General Appearance - In No Acute Distress] : in no acute distress [Oriented To Time, Place, And Person] : oriented to person, place, and time [Impaired Insight] : insight and judgment were intact [Affect] : the affect was normal [Sclera] : the sclera and conjunctiva were normal [PERRL With Normal Accommodation] : pupils were equal in size, round, reactive to light, with normal accommodation [Extraocular Movements] : extraocular movements were intact [FreeTextEntry1] : exam limited as visit was telehealth appointment. Below noted exam is from prior in office visit. exam updated to reflect any new changes noted during video conference   Neurological (>12): MS: awake, alert, and oriented to person, place, time, and situation.  Language: Clear with good comprehension. CNs: pupils equal round and reactive to light. VFF. EOMI. R lower facial droop  Motor: RUE 4/5, RLE 4-/5 with drift, LUE 5/5, LLE 4+/5 Sensation: Decreased sensation to light touch in  RLE.    Coordination: Difficulty with FNF of RUE, No dysmetria in LUE Gait: hemiparetic gait

## 2023-10-13 ENCOUNTER — LABORATORY RESULT (OUTPATIENT)
Age: 58
End: 2023-10-13

## 2023-10-13 ENCOUNTER — APPOINTMENT (OUTPATIENT)
Dept: INTERNAL MEDICINE | Facility: CLINIC | Age: 58
End: 2023-10-13
Payer: MEDICAID

## 2023-10-13 VITALS — DIASTOLIC BLOOD PRESSURE: 84 MMHG | SYSTOLIC BLOOD PRESSURE: 118 MMHG

## 2023-10-13 VITALS
BODY MASS INDEX: 26.06 KG/M2 | WEIGHT: 138 LBS | HEIGHT: 61 IN | DIASTOLIC BLOOD PRESSURE: 87 MMHG | TEMPERATURE: 98 F | SYSTOLIC BLOOD PRESSURE: 146 MMHG | HEART RATE: 63 BPM | OXYGEN SATURATION: 95 %

## 2023-10-13 DIAGNOSIS — Z23 ENCOUNTER FOR IMMUNIZATION: ICD-10-CM

## 2023-10-13 DIAGNOSIS — Z13.820 ENCOUNTER FOR SCREENING FOR OSTEOPOROSIS: ICD-10-CM

## 2023-10-13 DIAGNOSIS — M79.10 MYALGIA, UNSPECIFIED SITE: ICD-10-CM

## 2023-10-13 DIAGNOSIS — F32.A DEPRESSION, UNSPECIFIED: ICD-10-CM

## 2023-10-13 DIAGNOSIS — Z12.39 ENCOUNTER FOR OTHER SCREENING FOR MALIGNANT NEOPLASM OF BREAST: ICD-10-CM

## 2023-10-13 PROCEDURE — 99214 OFFICE O/P EST MOD 30 MIN: CPT | Mod: 25

## 2023-10-13 PROCEDURE — G0008: CPT

## 2023-10-13 PROCEDURE — 36415 COLL VENOUS BLD VENIPUNCTURE: CPT

## 2023-10-13 PROCEDURE — 90686 IIV4 VACC NO PRSV 0.5 ML IM: CPT

## 2023-10-17 LAB
ALBUMIN SERPL ELPH-MCNC: 4.6 G/DL
ALP BLD-CCNC: 136 U/L
ALT SERPL-CCNC: 24 U/L
ANION GAP SERPL CALC-SCNC: 11 MMOL/L
AST SERPL-CCNC: 28 U/L
BILIRUB SERPL-MCNC: 0.8 MG/DL
BUN SERPL-MCNC: 14 MG/DL
CALCIUM SERPL-MCNC: 9.9 MG/DL
CHLORIDE SERPL-SCNC: 103 MMOL/L
CHOLEST SERPL-MCNC: 153 MG/DL
CK SERPL-CCNC: 302 U/L
CO2 SERPL-SCNC: 27 MMOL/L
CREAT SERPL-MCNC: 0.44 MG/DL
EGFR: 113 ML/MIN/1.73M2
GLUCOSE SERPL-MCNC: 89 MG/DL
HDLC SERPL-MCNC: 68 MG/DL
LDLC SERPL CALC-MCNC: 65 MG/DL
NONHDLC SERPL-MCNC: 85 MG/DL
POTASSIUM SERPL-SCNC: 4 MMOL/L
PROT SERPL-MCNC: 7.6 G/DL
SODIUM SERPL-SCNC: 140 MMOL/L
TRIGL SERPL-MCNC: 110 MG/DL

## 2023-10-30 RX ORDER — ATORVASTATIN CALCIUM 20 MG/1
20 TABLET, FILM COATED ORAL
Qty: 90 | Refills: 3 | Status: DISCONTINUED | COMMUNITY
Start: 2022-07-25 | End: 2023-10-30

## 2023-11-01 ENCOUNTER — TRANSCRIPTION ENCOUNTER (OUTPATIENT)
Age: 58
End: 2023-11-01

## 2023-12-08 ENCOUNTER — APPOINTMENT (OUTPATIENT)
Dept: INTERNAL MEDICINE | Facility: CLINIC | Age: 58
End: 2023-12-08
Payer: MEDICAID

## 2023-12-08 VITALS
DIASTOLIC BLOOD PRESSURE: 82 MMHG | BODY MASS INDEX: 26.06 KG/M2 | SYSTOLIC BLOOD PRESSURE: 152 MMHG | TEMPERATURE: 98.6 F | HEART RATE: 70 BPM | RESPIRATION RATE: 15 BRPM | WEIGHT: 138 LBS | OXYGEN SATURATION: 95 % | HEIGHT: 61 IN

## 2023-12-08 VITALS — DIASTOLIC BLOOD PRESSURE: 82 MMHG | SYSTOLIC BLOOD PRESSURE: 118 MMHG

## 2023-12-08 DIAGNOSIS — R74.8 ABNORMAL LEVELS OF OTHER SERUM ENZYMES: ICD-10-CM

## 2023-12-08 DIAGNOSIS — R79.89 OTHER SPECIFIED ABNORMAL FINDINGS OF BLOOD CHEMISTRY: ICD-10-CM

## 2023-12-08 PROCEDURE — 36415 COLL VENOUS BLD VENIPUNCTURE: CPT

## 2023-12-08 PROCEDURE — 99214 OFFICE O/P EST MOD 30 MIN: CPT | Mod: 25

## 2023-12-18 LAB
ALBUMIN SERPL ELPH-MCNC: 4.4 G/DL
ALP BLD-CCNC: 124 U/L
ALT SERPL-CCNC: 23 U/L
ANION GAP SERPL CALC-SCNC: 10 MMOL/L
AST SERPL-CCNC: 28 U/L
BILIRUB SERPL-MCNC: 0.7 MG/DL
BUN SERPL-MCNC: 12 MG/DL
CALCIUM SERPL-MCNC: 9.8 MG/DL
CHLORIDE SERPL-SCNC: 104 MMOL/L
CHOLEST SERPL-MCNC: 224 MG/DL
CK SERPL-CCNC: 333 U/L
CO2 SERPL-SCNC: 27 MMOL/L
CREAT SERPL-MCNC: 0.48 MG/DL
EGFR: 110 ML/MIN/1.73M2
FOLATE SERPL-MCNC: >20 NG/ML
GLUCOSE SERPL-MCNC: 89 MG/DL
HDLC SERPL-MCNC: 64 MG/DL
LDLC SERPL CALC-MCNC: 144 MG/DL
NONHDLC SERPL-MCNC: 160 MG/DL
POTASSIUM SERPL-SCNC: 4.1 MMOL/L
PROT SERPL-MCNC: 7.5 G/DL
SODIUM SERPL-SCNC: 141 MMOL/L
TRIGL SERPL-MCNC: 96 MG/DL
VIT B12 SERPL-MCNC: 640 PG/ML

## 2023-12-18 RX ORDER — MULTIVITAMIN WITH FOLIC ACID 400 MCG
TABLET ORAL
Qty: 90 | Refills: 3 | Status: ACTIVE | COMMUNITY
Start: 2023-04-03 | End: 1900-01-01

## 2023-12-18 RX ORDER — CETIRIZINE HYDROCHLORIDE 5 MG/1
5 TABLET ORAL
Qty: 90 | Refills: 3 | Status: ACTIVE | COMMUNITY
Start: 2022-02-16 | End: 1900-01-01

## 2024-01-04 ENCOUNTER — APPOINTMENT (OUTPATIENT)
Dept: CARDIOLOGY | Facility: CLINIC | Age: 59
End: 2024-01-04
Payer: MEDICAID

## 2024-01-04 VITALS
DIASTOLIC BLOOD PRESSURE: 80 MMHG | OXYGEN SATURATION: 95 % | BODY MASS INDEX: 26.45 KG/M2 | WEIGHT: 140 LBS | HEART RATE: 87 BPM | SYSTOLIC BLOOD PRESSURE: 118 MMHG

## 2024-01-04 DIAGNOSIS — Z00.00 ENCOUNTER FOR GENERAL ADULT MEDICAL EXAMINATION W/OUT ABNORMAL FINDINGS: ICD-10-CM

## 2024-01-04 PROCEDURE — 99205 OFFICE O/P NEW HI 60 MIN: CPT | Mod: 25

## 2024-01-04 PROCEDURE — 99402 PREV MED CNSL INDIV APPRX 30: CPT

## 2024-01-04 PROCEDURE — G2211 COMPLEX E/M VISIT ADD ON: CPT

## 2024-01-04 PROCEDURE — 99215 OFFICE O/P EST HI 40 MIN: CPT | Mod: 25

## 2024-02-02 ENCOUNTER — NON-APPOINTMENT (OUTPATIENT)
Age: 59
End: 2024-02-02

## 2024-02-02 ENCOUNTER — APPOINTMENT (OUTPATIENT)
Dept: CARDIOLOGY | Facility: CLINIC | Age: 59
End: 2024-02-02
Payer: MEDICAID

## 2024-02-02 VITALS
HEIGHT: 61 IN | BODY MASS INDEX: 26.43 KG/M2 | DIASTOLIC BLOOD PRESSURE: 86 MMHG | WEIGHT: 140 LBS | HEART RATE: 86 BPM | OXYGEN SATURATION: 97 % | SYSTOLIC BLOOD PRESSURE: 116 MMHG

## 2024-02-02 PROCEDURE — 93000 ELECTROCARDIOGRAM COMPLETE: CPT

## 2024-02-02 PROCEDURE — 99214 OFFICE O/P EST MOD 30 MIN: CPT | Mod: 25

## 2024-02-02 PROCEDURE — 36415 COLL VENOUS BLD VENIPUNCTURE: CPT

## 2024-02-02 NOTE — DISCUSSION/SUMMARY
[FreeTextEntry1] : #CVA- Patient followed by Neurology and neurosurgery, per notes CVA in setting of HTN #HTN- Lisinopril 10 mg daily #HLD- CK elevated  On Praulent Will check CK #FU in 4 months [EKG obtained to assist in diagnosis and management of assessed problem(s)] : EKG obtained to assist in diagnosis and management of assessed problem(s)

## 2024-02-02 NOTE — HISTORY OF PRESENT ILLNESS
[FreeTextEntry1] : 58 year old woman with history of smoking who presented to [facility] after CVA on December 11, 2021 No medical care or followup prior to that. Discovered uncontrolled HTN and dc'd to rehab No history of arrhythmia.  TTE- Normal LV in December  #CVA- Patient followed by Neurology and neurosurgery, per notes CVA in setting of HTN #HTN- Lisinopril 10 mg daily #HLD- CK elevated  On Praulent Will check CK

## 2024-02-05 LAB — CK SERPL-CCNC: 149 U/L

## 2024-02-23 ENCOUNTER — APPOINTMENT (OUTPATIENT)
Dept: INTERNAL MEDICINE | Facility: CLINIC | Age: 59
End: 2024-02-23
Payer: MEDICAID

## 2024-02-23 VITALS
HEART RATE: 89 BPM | RESPIRATION RATE: 15 BRPM | DIASTOLIC BLOOD PRESSURE: 76 MMHG | OXYGEN SATURATION: 97 % | SYSTOLIC BLOOD PRESSURE: 130 MMHG | TEMPERATURE: 98.9 F | WEIGHT: 141 LBS | BODY MASS INDEX: 26.62 KG/M2 | HEIGHT: 61 IN

## 2024-02-23 PROCEDURE — 99214 OFFICE O/P EST MOD 30 MIN: CPT | Mod: 25

## 2024-02-23 PROCEDURE — G2211 COMPLEX E/M VISIT ADD ON: CPT | Mod: NC,1L

## 2024-02-23 PROCEDURE — 36415 COLL VENOUS BLD VENIPUNCTURE: CPT

## 2024-02-23 NOTE — PLAN
[FreeTextEntry1] : HLD, CVA - f/u lipid panel and CMP - no longer on statin. now on praluent injection every 2 weeks - Advised on lifestyle modifications such as increasing exercise, cardio at least 150 mins per week and dietary changes.  HTN - Blood pressure at goal - continue lisinopril 10 mgQD - advised to log BP at home. - advised on dietary changes, increasing exercise, avoiding excess salt.

## 2024-02-23 NOTE — REVIEW OF SYSTEMS
[Fever] : no fever [Chills] : no chills [Fatigue] : no fatigue [Chest Pain] : no chest pain [Palpitations] : no palpitations [Lower Ext Edema] : no lower extremity edema [Shortness Of Breath] : no shortness of breath [Wheezing] : no wheezing [Cough] : no cough [Dyspnea on Exertion] : no dyspnea on exertion [Abdominal Pain] : no abdominal pain [Nausea] : no nausea [Diarrhea] : diarrhea [Vomiting] : no vomiting [Dysuria] : no dysuria [Hematuria] : no hematuria [Frequency] : no frequency [Muscle Weakness] : no muscle weakness [Muscle Pain] : no muscle pain [Skin Rash] : no skin rash [Headache] : no headache [Dizziness] : no dizziness [Fainting] : no fainting

## 2024-02-23 NOTE — HISTORY OF PRESENT ILLNESS
[FreeTextEntry1] : follow up [de-identified] : 57 yo F pmh CVA, tobacco abuse (quit 2021), HTN, depression presents for follow up CK was elevated and statin was stopped. Seen by lipid specialist and now on praluent.  CK Feb 2024 now normal.  Patient denies changes to muscle aches with statin being stopped.

## 2024-02-23 NOTE — PHYSICAL EXAM
[No Acute Distress] : no acute distress [Well-Appearing] : well-appearing [No Respiratory Distress] : no respiratory distress  [No Accessory Muscle Use] : no accessory muscle use [Clear to Auscultation] : lungs were clear to auscultation bilaterally [Normal Rate] : normal rate  [Regular Rhythm] : with a regular rhythm [Normal S1, S2] : normal S1 and S2 [Soft] : abdomen soft [Non Tender] : non-tender [Non-distended] : non-distended [Normal Bowel Sounds] : normal bowel sounds [Alert and Oriented x3] : oriented to person, place, and time [Speech Grossly Normal] : speech grossly normal

## 2024-02-25 LAB
ALBUMIN SERPL ELPH-MCNC: 4.4 G/DL
ALP BLD-CCNC: 127 U/L
ALT SERPL-CCNC: 19 U/L
ANION GAP SERPL CALC-SCNC: 13 MMOL/L
AST SERPL-CCNC: 24 U/L
BILIRUB SERPL-MCNC: 0.6 MG/DL
BUN SERPL-MCNC: 12 MG/DL
CALCIUM SERPL-MCNC: 9.6 MG/DL
CHLORIDE SERPL-SCNC: 105 MMOL/L
CHOLEST SERPL-MCNC: 152 MG/DL
CO2 SERPL-SCNC: 23 MMOL/L
CREAT SERPL-MCNC: 0.45 MG/DL
EGFR: 112 ML/MIN/1.73M2
GLUCOSE SERPL-MCNC: 90 MG/DL
HDLC SERPL-MCNC: 65 MG/DL
LDLC SERPL CALC-MCNC: 65 MG/DL
NONHDLC SERPL-MCNC: 87 MG/DL
POTASSIUM SERPL-SCNC: 4.4 MMOL/L
PROT SERPL-MCNC: 7.3 G/DL
SODIUM SERPL-SCNC: 141 MMOL/L
TRIGL SERPL-MCNC: 125 MG/DL

## 2024-03-05 RX ORDER — LISINOPRIL 10 MG/1
10 TABLET ORAL DAILY
Qty: 3 | Refills: 3 | Status: ACTIVE | COMMUNITY
Start: 2022-03-23 | End: 1900-01-01

## 2024-03-29 LAB
ALBUMIN SERPL ELPH-MCNC: 4.4 G/DL
ALP BLD-CCNC: 141 U/L
ALT SERPL-CCNC: 21 U/L
ANION GAP SERPL CALC-SCNC: 14 MMOL/L
AST SERPL-CCNC: 21 U/L
BILIRUB SERPL-MCNC: 0.4 MG/DL
BUN SERPL-MCNC: 12 MG/DL
CALCIUM SERPL-MCNC: 9.6 MG/DL
CHLORIDE SERPL-SCNC: 103 MMOL/L
CHOLEST SERPL-MCNC: 143 MG/DL
CK SERPL-CCNC: 180 U/L
CO2 SERPL-SCNC: 24 MMOL/L
CREAT SERPL-MCNC: 0.46 MG/DL
EGFR: 112 ML/MIN/1.73M2
GLUCOSE SERPL-MCNC: 93 MG/DL
HCT VFR BLD CALC: 44.3 %
HDLC SERPL-MCNC: 60 MG/DL
HGB BLD-MCNC: 14.2 G/DL
LDLC SERPL CALC-MCNC: 49 MG/DL
MCHC RBC-ENTMCNC: 28 PG
MCHC RBC-ENTMCNC: 32.1 GM/DL
MCV RBC AUTO: 87.4 FL
NONHDLC SERPL-MCNC: 83 MG/DL
PLATELET # BLD AUTO: 295 K/UL
POTASSIUM SERPL-SCNC: 4.2 MMOL/L
PROT SERPL-MCNC: 7.4 G/DL
RBC # BLD: 5.07 M/UL
RBC # FLD: 13.5 %
SODIUM SERPL-SCNC: 140 MMOL/L
TRIGL SERPL-MCNC: 215 MG/DL
WBC # FLD AUTO: 6.08 K/UL

## 2024-04-01 ENCOUNTER — APPOINTMENT (OUTPATIENT)
Dept: NEUROLOGY | Facility: CLINIC | Age: 59
End: 2024-04-01

## 2024-04-03 ENCOUNTER — NON-APPOINTMENT (OUTPATIENT)
Age: 59
End: 2024-04-03

## 2024-04-05 ENCOUNTER — APPOINTMENT (OUTPATIENT)
Dept: CARDIOLOGY | Facility: CLINIC | Age: 59
End: 2024-04-05
Payer: MEDICAID

## 2024-04-05 VITALS
BODY MASS INDEX: 26.83 KG/M2 | SYSTOLIC BLOOD PRESSURE: 134 MMHG | OXYGEN SATURATION: 97 % | HEART RATE: 79 BPM | WEIGHT: 142 LBS | DIASTOLIC BLOOD PRESSURE: 81 MMHG

## 2024-04-05 PROCEDURE — G2211 COMPLEX E/M VISIT ADD ON: CPT | Mod: NC,1L

## 2024-04-05 PROCEDURE — 99215 OFFICE O/P EST HI 40 MIN: CPT

## 2024-05-01 ENCOUNTER — RX RENEWAL (OUTPATIENT)
Age: 59
End: 2024-05-01

## 2024-05-17 ENCOUNTER — APPOINTMENT (OUTPATIENT)
Dept: INTERNAL MEDICINE | Facility: CLINIC | Age: 59
End: 2024-05-17

## 2024-06-10 ENCOUNTER — APPOINTMENT (OUTPATIENT)
Dept: NEUROLOGY | Facility: CLINIC | Age: 59
End: 2024-06-10

## 2024-06-15 LAB
ALBUMIN SERPL ELPH-MCNC: 4.2 G/DL
ALP BLD-CCNC: 128 U/L
ALT SERPL-CCNC: 19 U/L
ANION GAP SERPL CALC-SCNC: 15 MMOL/L
AST SERPL-CCNC: 24 U/L
BILIRUB SERPL-MCNC: 0.6 MG/DL
BUN SERPL-MCNC: 18 MG/DL
CALCIUM SERPL-MCNC: 9.8 MG/DL
CHLORIDE SERPL-SCNC: 103 MMOL/L
CHOLEST SERPL-MCNC: 144 MG/DL
CO2 SERPL-SCNC: 24 MMOL/L
CREAT SERPL-MCNC: 0.44 MG/DL
EGFR: 113 ML/MIN/1.73M2
ESTIMATED AVERAGE GLUCOSE: 114 MG/DL
GLUCOSE SERPL-MCNC: 78 MG/DL
HBA1C MFR BLD HPLC: 5.6 %
HCT VFR BLD CALC: 44.6 %
HDLC SERPL-MCNC: 61 MG/DL
HGB BLD-MCNC: 14.1 G/DL
LDLC SERPL CALC-MCNC: 62 MG/DL
MCHC RBC-ENTMCNC: 27.6 PG
MCHC RBC-ENTMCNC: 31.6 GM/DL
MCV RBC AUTO: 87.5 FL
NONHDLC SERPL-MCNC: 83 MG/DL
PLATELET # BLD AUTO: 293 K/UL
POTASSIUM SERPL-SCNC: 4 MMOL/L
PROT SERPL-MCNC: 7.1 G/DL
RBC # BLD: 5.1 M/UL
RBC # FLD: 13.8 %
SODIUM SERPL-SCNC: 142 MMOL/L
TRIGL SERPL-MCNC: 123 MG/DL
TSH SERPL-ACNC: 0.94 UIU/ML
WBC # FLD AUTO: 5.55 K/UL

## 2024-06-17 RX ORDER — GABAPENTIN 100 MG/1
100 CAPSULE ORAL
Qty: 180 | Refills: 0 | Status: ACTIVE | COMMUNITY
Start: 2022-04-28 | End: 1900-01-01

## 2024-06-20 PROBLEM — E78.5 HLD (HYPERLIPIDEMIA): Status: ACTIVE | Noted: 2022-11-13

## 2024-06-20 PROBLEM — I10 HTN (HYPERTENSION): Status: ACTIVE | Noted: 2022-02-16

## 2024-06-21 ENCOUNTER — NON-APPOINTMENT (OUTPATIENT)
Age: 59
End: 2024-06-21

## 2024-06-21 ENCOUNTER — APPOINTMENT (OUTPATIENT)
Dept: CARDIOLOGY | Facility: CLINIC | Age: 59
End: 2024-06-21
Payer: MEDICAID

## 2024-06-21 VITALS
BODY MASS INDEX: 26.81 KG/M2 | HEIGHT: 61 IN | SYSTOLIC BLOOD PRESSURE: 120 MMHG | WEIGHT: 142 LBS | DIASTOLIC BLOOD PRESSURE: 84 MMHG | OXYGEN SATURATION: 97 % | HEART RATE: 102 BPM

## 2024-06-21 DIAGNOSIS — I10 ESSENTIAL (PRIMARY) HYPERTENSION: ICD-10-CM

## 2024-06-21 DIAGNOSIS — E78.5 HYPERLIPIDEMIA, UNSPECIFIED: ICD-10-CM

## 2024-06-21 PROCEDURE — G2211 COMPLEX E/M VISIT ADD ON: CPT | Mod: NC,1L

## 2024-06-21 PROCEDURE — 93000 ELECTROCARDIOGRAM COMPLETE: CPT

## 2024-06-21 PROCEDURE — 99214 OFFICE O/P EST MOD 30 MIN: CPT | Mod: 25

## 2024-06-21 RX ORDER — ICOSAPENT ETHYL 1 G/1
1 CAPSULE ORAL
Qty: 360 | Refills: 3 | Status: ACTIVE | COMMUNITY
Start: 2024-04-05 | End: 1900-01-01

## 2024-06-21 RX ORDER — ALIROCUMAB 150 MG/ML
150 INJECTION, SOLUTION SUBCUTANEOUS
Qty: 6 | Refills: 3 | Status: ACTIVE | COMMUNITY
Start: 2024-01-04 | End: 1900-01-01

## 2024-06-28 ENCOUNTER — APPOINTMENT (OUTPATIENT)
Dept: NEUROLOGY | Facility: CLINIC | Age: 59
End: 2024-06-28
Payer: MEDICAID

## 2024-06-28 VITALS
WEIGHT: 142 LBS | HEIGHT: 61 IN | BODY MASS INDEX: 26.81 KG/M2 | SYSTOLIC BLOOD PRESSURE: 156 MMHG | HEART RATE: 79 BPM | DIASTOLIC BLOOD PRESSURE: 94 MMHG

## 2024-06-28 DIAGNOSIS — I63.9 CEREBRAL INFARCTION, UNSPECIFIED: ICD-10-CM

## 2024-06-28 PROCEDURE — 99214 OFFICE O/P EST MOD 30 MIN: CPT

## 2024-06-28 PROCEDURE — 93892 TCD EMBOLI DETECT W/O INJ: CPT

## 2024-06-28 PROCEDURE — 93886 INTRACRANIAL COMPLETE STUDY: CPT

## 2024-06-28 PROCEDURE — 93880 EXTRACRANIAL BILAT STUDY: CPT

## 2024-06-28 PROCEDURE — G2211 COMPLEX E/M VISIT ADD ON: CPT | Mod: NC,1L

## 2024-07-12 ENCOUNTER — APPOINTMENT (OUTPATIENT)
Dept: INTERNAL MEDICINE | Facility: CLINIC | Age: 59
End: 2024-07-12
Payer: MEDICAID

## 2024-07-12 VITALS
HEIGHT: 61 IN | DIASTOLIC BLOOD PRESSURE: 84 MMHG | HEART RATE: 83 BPM | BODY MASS INDEX: 27.19 KG/M2 | WEIGHT: 144 LBS | OXYGEN SATURATION: 95 % | SYSTOLIC BLOOD PRESSURE: 134 MMHG | TEMPERATURE: 97.9 F

## 2024-07-12 DIAGNOSIS — Z00.00 ENCOUNTER FOR GENERAL ADULT MEDICAL EXAMINATION W/OUT ABNORMAL FINDINGS: ICD-10-CM

## 2024-07-12 DIAGNOSIS — Z82.62 FAMILY HISTORY OF OSTEOPOROSIS: ICD-10-CM

## 2024-07-12 DIAGNOSIS — I10 ESSENTIAL (PRIMARY) HYPERTENSION: ICD-10-CM

## 2024-07-12 DIAGNOSIS — E78.5 HYPERLIPIDEMIA, UNSPECIFIED: ICD-10-CM

## 2024-07-12 DIAGNOSIS — Z78.0 ASYMPTOMATIC MENOPAUSAL STATE: ICD-10-CM

## 2024-07-12 DIAGNOSIS — E04.1 NONTOXIC SINGLE THYROID NODULE: ICD-10-CM

## 2024-07-12 PROCEDURE — 99396 PREV VISIT EST AGE 40-64: CPT

## 2024-07-12 PROCEDURE — G0296 VISIT TO DETERM LDCT ELIG: CPT

## 2024-07-12 RX ORDER — BIFIDOBACTERIUM LONGUM 10MM CELL
4 CAPSULE ORAL DAILY
Qty: 1 | Refills: 2 | Status: ACTIVE | COMMUNITY
Start: 2024-07-12 | End: 1900-01-01

## 2024-07-17 ENCOUNTER — NON-APPOINTMENT (OUTPATIENT)
Age: 59
End: 2024-07-17

## 2024-07-17 DIAGNOSIS — Z87.891 PERSONAL HISTORY OF NICOTINE DEPENDENCE: ICD-10-CM

## 2024-07-24 ENCOUNTER — RESULT REVIEW (OUTPATIENT)
Age: 59
End: 2024-07-24

## 2024-07-24 ENCOUNTER — APPOINTMENT (OUTPATIENT)
Dept: RADIOLOGY | Facility: IMAGING CENTER | Age: 59
End: 2024-07-24
Payer: MEDICAID

## 2024-07-24 PROCEDURE — 77085 DXA BONE DENSITY AXL VRT FX: CPT | Mod: 26

## 2024-07-26 ENCOUNTER — APPOINTMENT (OUTPATIENT)
Dept: CT IMAGING | Facility: CLINIC | Age: 59
End: 2024-07-26
Payer: MEDICAID

## 2024-07-26 ENCOUNTER — APPOINTMENT (OUTPATIENT)
Dept: ULTRASOUND IMAGING | Facility: CLINIC | Age: 59
End: 2024-07-26
Payer: MEDICAID

## 2024-07-26 PROCEDURE — 76536 US EXAM OF HEAD AND NECK: CPT | Mod: 26

## 2024-07-26 PROCEDURE — 71271 CT THORAX LUNG CANCER SCR C-: CPT | Mod: 26

## 2024-08-02 DIAGNOSIS — K31.89 OTHER DISEASES OF STOMACH AND DUODENUM: ICD-10-CM

## 2024-08-02 DIAGNOSIS — M81.0 AGE-RELATED OSTEOPOROSIS W/OUT CURRENT PATHOLOGICAL FRACTURE: ICD-10-CM

## 2024-08-16 ENCOUNTER — NON-APPOINTMENT (OUTPATIENT)
Age: 59
End: 2024-08-16

## 2024-08-23 ENCOUNTER — APPOINTMENT (OUTPATIENT)
Dept: DERMATOLOGY | Facility: CLINIC | Age: 59
End: 2024-08-23

## 2024-09-06 ENCOUNTER — APPOINTMENT (OUTPATIENT)
Dept: CARDIOLOGY | Facility: CLINIC | Age: 59
End: 2024-09-06
Payer: MEDICAID

## 2024-09-06 ENCOUNTER — NON-APPOINTMENT (OUTPATIENT)
Age: 59
End: 2024-09-06

## 2024-09-06 VITALS
WEIGHT: 145 LBS | SYSTOLIC BLOOD PRESSURE: 126 MMHG | OXYGEN SATURATION: 94 % | BODY MASS INDEX: 27.38 KG/M2 | HEART RATE: 92 BPM | DIASTOLIC BLOOD PRESSURE: 81 MMHG | HEIGHT: 61 IN

## 2024-09-06 DIAGNOSIS — I10 ESSENTIAL (PRIMARY) HYPERTENSION: ICD-10-CM

## 2024-09-06 DIAGNOSIS — E78.5 HYPERLIPIDEMIA, UNSPECIFIED: ICD-10-CM

## 2024-09-06 PROCEDURE — G2211 COMPLEX E/M VISIT ADD ON: CPT | Mod: NC

## 2024-09-06 PROCEDURE — 93000 ELECTROCARDIOGRAM COMPLETE: CPT

## 2024-09-06 PROCEDURE — 99215 OFFICE O/P EST HI 40 MIN: CPT | Mod: 25

## 2024-09-06 NOTE — DISCUSSION/SUMMARY
[FreeTextEntry1] : 58 year old woman with history of smoking who presented to [facility] after CVA on December 11, 2021 No medical care or followup prior to that. Discovered uncontrolled HTN and dc'd to rehab No history of arrhythmia.  TTE- Normal LV in December  #CVA- Patient followed by Neurology and neurosurgery, per notes CVA in setting of HTN #HTN- Lisinopril 10 mg daily #HLD-  On Praulent FU with Dr. Ballard as needed #FU in 4 months [EKG obtained to assist in diagnosis and management of assessed problem(s)] : EKG obtained to assist in diagnosis and management of assessed problem(s)

## 2024-09-06 NOTE — HISTORY OF PRESENT ILLNESS
[FreeTextEntry1] : 58 year old woman with history of smoking who presented to [facility] after CVA on December 11, 2021 No medical care or followup prior to that. Discovered uncontrolled HTN and dc'd to rehab No history of arrhythmia.  TTE- Normal LV in December  #CVA- Patient followed by Neurology and neurosurgery, per notes CVA in setting of HTN #HTN- Lisinopril 10 mg daily #HLD-  On Praulent FU with Dr. Ballard as needed

## 2024-10-25 ENCOUNTER — APPOINTMENT (OUTPATIENT)
Dept: INTERNAL MEDICINE | Facility: CLINIC | Age: 59
End: 2024-10-25

## 2024-11-01 ENCOUNTER — APPOINTMENT (OUTPATIENT)
Dept: ENDOCRINOLOGY | Facility: CLINIC | Age: 59
End: 2024-11-01

## 2024-12-16 ENCOUNTER — RX RENEWAL (OUTPATIENT)
Age: 59
End: 2024-12-16

## 2025-01-13 ENCOUNTER — APPOINTMENT (OUTPATIENT)
Dept: INTERNAL MEDICINE | Facility: CLINIC | Age: 60
End: 2025-01-13

## 2025-01-13 VITALS
SYSTOLIC BLOOD PRESSURE: 136 MMHG | WEIGHT: 139 LBS | BODY MASS INDEX: 26.24 KG/M2 | TEMPERATURE: 97.8 F | DIASTOLIC BLOOD PRESSURE: 83 MMHG | HEIGHT: 61 IN | OXYGEN SATURATION: 95 % | HEART RATE: 79 BPM | RESPIRATION RATE: 15 BRPM

## 2025-01-13 DIAGNOSIS — K31.89 OTHER DISEASES OF STOMACH AND DUODENUM: ICD-10-CM

## 2025-01-13 DIAGNOSIS — I10 ESSENTIAL (PRIMARY) HYPERTENSION: ICD-10-CM

## 2025-01-13 DIAGNOSIS — M81.0 AGE-RELATED OSTEOPOROSIS W/OUT CURRENT PATHOLOGICAL FRACTURE: ICD-10-CM

## 2025-01-13 DIAGNOSIS — E78.5 HYPERLIPIDEMIA, UNSPECIFIED: ICD-10-CM

## 2025-01-13 DIAGNOSIS — Z23 ENCOUNTER FOR IMMUNIZATION: ICD-10-CM

## 2025-01-13 PROCEDURE — 90656 IIV3 VACC NO PRSV 0.5 ML IM: CPT

## 2025-01-13 PROCEDURE — 36415 COLL VENOUS BLD VENIPUNCTURE: CPT

## 2025-01-13 PROCEDURE — G0008: CPT

## 2025-01-13 PROCEDURE — 99214 OFFICE O/P EST MOD 30 MIN: CPT | Mod: 25

## 2025-01-17 ENCOUNTER — APPOINTMENT (OUTPATIENT)
Dept: INTERNAL MEDICINE | Facility: CLINIC | Age: 60
End: 2025-01-17

## 2025-01-17 DIAGNOSIS — J06.9 ACUTE UPPER RESPIRATORY INFECTION, UNSPECIFIED: ICD-10-CM

## 2025-01-17 PROCEDURE — 99203 OFFICE O/P NEW LOW 30 MIN: CPT | Mod: 95

## 2025-01-17 PROCEDURE — G2211 COMPLEX E/M VISIT ADD ON: CPT | Mod: NC

## 2025-02-07 NOTE — SWALLOW BEDSIDE ASSESSMENT ADULT - SLP GENERAL OBSERVATIONS
Pt encountered OOB to chair. +KFT. On room air. Sister at bedside. Pt initially asleep, easily roused to tactile stimulation. Oriented to self and current month, not oriented to place/ year. Requires cues to follow simple directives.
No

## 2025-04-02 ENCOUNTER — APPOINTMENT (OUTPATIENT)
Dept: CARDIOLOGY | Facility: CLINIC | Age: 60
End: 2025-04-02
Payer: MEDICAID

## 2025-04-02 ENCOUNTER — NON-APPOINTMENT (OUTPATIENT)
Age: 60
End: 2025-04-02

## 2025-04-02 VITALS
OXYGEN SATURATION: 96 % | TEMPERATURE: 98 F | BODY MASS INDEX: 27.19 KG/M2 | SYSTOLIC BLOOD PRESSURE: 125 MMHG | HEART RATE: 92 BPM | HEIGHT: 61 IN | DIASTOLIC BLOOD PRESSURE: 78 MMHG | WEIGHT: 144 LBS

## 2025-04-02 DIAGNOSIS — E78.5 HYPERLIPIDEMIA, UNSPECIFIED: ICD-10-CM

## 2025-04-02 DIAGNOSIS — I10 ESSENTIAL (PRIMARY) HYPERTENSION: ICD-10-CM

## 2025-04-02 PROCEDURE — G2211 COMPLEX E/M VISIT ADD ON: CPT | Mod: NC

## 2025-04-02 PROCEDURE — 93000 ELECTROCARDIOGRAM COMPLETE: CPT

## 2025-04-02 PROCEDURE — 99214 OFFICE O/P EST MOD 30 MIN: CPT

## 2025-04-04 ENCOUNTER — APPOINTMENT (OUTPATIENT)
Dept: INTERNAL MEDICINE | Facility: CLINIC | Age: 60
End: 2025-04-04

## 2025-04-21 NOTE — PHYSICAL THERAPY INITIAL EVALUATION ADULT - PRECAUTIONS/LIMITATIONS, REHAB EVAL
These are the labs that you will obtain in May:  dsDNA, complement 3, complement 4, cbc, cmp, protein/creatinine ration urine, and urinalysis.       In the next few weeks you may receive a Press Ganey survey regarding your most recent clinic visit with us.  Please take a few moments out of your day to accurately evaluate your visit.  We strive to provide you with the best medical care. Thank you for your time and we look forward to your next visit.     Results: If you recently had testing performed and your results are normal, we will notify you of your results in a letter. If we need to contact you regarding any abnormal results, we will make 2 attempts to reach you at the number you have listed during your office visit today. If we are unable to reach you, a letter with your results and any further instructions will be mailed to your home.    Refills: If you need a refill of your medication, please contact your pharmacy FIRST. You may have refills on file with them; if not, they will contact our office to refill the prescription on your behalf.     [facility] Hours:  Monday - Thursday: 7:00 am - 6:00 pm  Friday: 7:00 am - 5:00 pm  Saturday: 7:00 am - noon    Please do not hesitate to call our office with any questions or concerns AT [phone].     no known precautions/limitations

## 2025-05-27 ENCOUNTER — APPOINTMENT (OUTPATIENT)
Dept: INTERNAL MEDICINE | Facility: CLINIC | Age: 60
End: 2025-05-27
Payer: MEDICAID

## 2025-05-27 DIAGNOSIS — M79.89 OTHER SPECIFIED SOFT TISSUE DISORDERS: ICD-10-CM

## 2025-05-27 DIAGNOSIS — E78.5 HYPERLIPIDEMIA, UNSPECIFIED: ICD-10-CM

## 2025-05-27 PROCEDURE — G2211 COMPLEX E/M VISIT ADD ON: CPT | Mod: NC,95

## 2025-05-27 PROCEDURE — 99214 OFFICE O/P EST MOD 30 MIN: CPT | Mod: 95

## 2025-05-28 ENCOUNTER — OUTPATIENT (OUTPATIENT)
Dept: OUTPATIENT SERVICES | Facility: HOSPITAL | Age: 60
LOS: 1 days | End: 2025-05-28
Payer: MEDICAID

## 2025-05-28 ENCOUNTER — APPOINTMENT (OUTPATIENT)
Dept: ULTRASOUND IMAGING | Facility: CLINIC | Age: 60
End: 2025-05-28
Payer: MEDICAID

## 2025-05-28 DIAGNOSIS — M79.89 OTHER SPECIFIED SOFT TISSUE DISORDERS: ICD-10-CM

## 2025-05-28 PROCEDURE — 93971 EXTREMITY STUDY: CPT

## 2025-05-28 PROCEDURE — 93971 EXTREMITY STUDY: CPT | Mod: 26,RT

## 2025-06-02 ENCOUNTER — RX RENEWAL (OUTPATIENT)
Age: 60
End: 2025-06-02

## 2025-06-02 NOTE — OCCUPATIONAL THERAPY INITIAL EVALUATION ADULT - PHYSICAL ASSIST/NONPHYSICAL ASSIST: SUPINE/SIT, REHAB EVAL
Pt returned to room 208. Pt is alert and oriented times 4. Pt reports no discomfort. Bedside report given to JOCELIN Watkins. Call bell within reach, side rails up, bed locked and in lowest position. Order for speech eval, receiving RN inquiring if pt cna start diet or needs speech eval first; message sent to Dr. Dai w/ receiving RN copied   verbal cues/nonverbal cues (demo/gestures)/2 person assist

## 2025-06-04 ENCOUNTER — RX RENEWAL (OUTPATIENT)
Age: 60
End: 2025-06-04

## 2025-06-18 ENCOUNTER — NON-APPOINTMENT (OUTPATIENT)
Age: 60
End: 2025-06-18

## 2025-06-20 ENCOUNTER — NON-APPOINTMENT (OUTPATIENT)
Age: 60
End: 2025-06-20

## 2025-06-20 ENCOUNTER — APPOINTMENT (OUTPATIENT)
Dept: CARDIOLOGY | Facility: CLINIC | Age: 60
End: 2025-06-20
Payer: MEDICAID

## 2025-06-20 VITALS
BODY MASS INDEX: 26.83 KG/M2 | SYSTOLIC BLOOD PRESSURE: 123 MMHG | WEIGHT: 142 LBS | OXYGEN SATURATION: 96 % | HEART RATE: 80 BPM | DIASTOLIC BLOOD PRESSURE: 82 MMHG

## 2025-06-20 PROCEDURE — 99401 PREV MED CNSL INDIV APPRX 15: CPT

## 2025-06-20 PROCEDURE — G0537: CPT

## 2025-06-20 PROCEDURE — 93000 ELECTROCARDIOGRAM COMPLETE: CPT

## 2025-06-20 PROCEDURE — 99214 OFFICE O/P EST MOD 30 MIN: CPT | Mod: 25

## 2025-06-27 ENCOUNTER — APPOINTMENT (OUTPATIENT)
Dept: INTERNAL MEDICINE | Facility: CLINIC | Age: 60
End: 2025-06-27

## 2025-06-30 ENCOUNTER — APPOINTMENT (OUTPATIENT)
Dept: NEUROLOGY | Facility: CLINIC | Age: 60
End: 2025-06-30
Payer: MEDICAID

## 2025-06-30 VITALS
HEART RATE: 86 BPM | DIASTOLIC BLOOD PRESSURE: 84 MMHG | HEIGHT: 61 IN | BODY MASS INDEX: 26.81 KG/M2 | WEIGHT: 142 LBS | SYSTOLIC BLOOD PRESSURE: 128 MMHG

## 2025-06-30 PROCEDURE — 99214 OFFICE O/P EST MOD 30 MIN: CPT

## 2025-06-30 PROCEDURE — G2211 COMPLEX E/M VISIT ADD ON: CPT | Mod: NC

## 2025-06-30 PROCEDURE — 93880 EXTRACRANIAL BILAT STUDY: CPT

## 2025-06-30 RX ORDER — CALCIUM CARBONATE 600 MG
TABLET ORAL
Refills: 0 | Status: ACTIVE | COMMUNITY

## 2025-07-02 ENCOUNTER — RX RENEWAL (OUTPATIENT)
Age: 60
End: 2025-07-02

## 2025-07-03 ENCOUNTER — APPOINTMENT (OUTPATIENT)
Dept: INTERNAL MEDICINE | Facility: CLINIC | Age: 60
End: 2025-07-03
Payer: MEDICAID

## 2025-07-03 VITALS
BODY MASS INDEX: 26.81 KG/M2 | RESPIRATION RATE: 15 BRPM | HEIGHT: 61 IN | TEMPERATURE: 97.7 F | OXYGEN SATURATION: 96 % | HEART RATE: 78 BPM | WEIGHT: 142 LBS | SYSTOLIC BLOOD PRESSURE: 128 MMHG | DIASTOLIC BLOOD PRESSURE: 82 MMHG

## 2025-07-03 PROCEDURE — 99214 OFFICE O/P EST MOD 30 MIN: CPT

## 2025-07-03 PROCEDURE — G2211 COMPLEX E/M VISIT ADD ON: CPT | Mod: NC

## 2025-07-03 RX ORDER — ICOSAPENT ETHYL 500 MG/1
CAPSULE ORAL
Refills: 0 | Status: ACTIVE | COMMUNITY